# Patient Record
Sex: FEMALE | Race: WHITE | NOT HISPANIC OR LATINO | Employment: OTHER | URBAN - METROPOLITAN AREA
[De-identification: names, ages, dates, MRNs, and addresses within clinical notes are randomized per-mention and may not be internally consistent; named-entity substitution may affect disease eponyms.]

---

## 2017-04-17 LAB — HCV AB SER-ACNC: NON REACTIVE

## 2018-12-24 ENCOUNTER — TRANSCRIBE ORDERS (OUTPATIENT)
Dept: ADMINISTRATIVE | Age: 68
End: 2018-12-24

## 2018-12-24 ENCOUNTER — OFFICE VISIT (OUTPATIENT)
Dept: LAB | Age: 68
End: 2018-12-24
Payer: MEDICARE

## 2018-12-24 DIAGNOSIS — N81.2 UTEROVAGINAL PROLAPSE, INCOMPLETE: ICD-10-CM

## 2018-12-24 DIAGNOSIS — N81.2 UTEROVAGINAL PROLAPSE, INCOMPLETE: Primary | ICD-10-CM

## 2018-12-24 LAB
ATRIAL RATE: 94 BPM
P AXIS: 78 DEGREES
PR INTERVAL: 146 MS
QRS AXIS: 56 DEGREES
QRSD INTERVAL: 90 MS
QT INTERVAL: 354 MS
QTC INTERVAL: 442 MS
T WAVE AXIS: 52 DEGREES
VENTRICULAR RATE: 94 BPM

## 2018-12-24 PROCEDURE — 93005 ELECTROCARDIOGRAM TRACING: CPT

## 2018-12-24 PROCEDURE — 93010 ELECTROCARDIOGRAM REPORT: CPT | Performed by: INTERNAL MEDICINE

## 2018-12-27 RX ORDER — MULTIVIT-MIN/IRON/FOLIC ACID/K 18-600-40
2000 CAPSULE ORAL DAILY
COMMUNITY

## 2018-12-27 RX ORDER — ATORVASTATIN CALCIUM 20 MG/1
20 TABLET, FILM COATED ORAL DAILY
COMMUNITY
Start: 2012-01-03 | End: 2019-09-06 | Stop reason: SDUPTHER

## 2018-12-27 RX ORDER — LEVOTHYROXINE SODIUM 88 UG/1
88 TABLET ORAL DAILY
COMMUNITY
End: 2019-09-06 | Stop reason: SDUPTHER

## 2018-12-27 RX ORDER — MULTIVITAMIN
1 TABLET ORAL DAILY
COMMUNITY

## 2019-01-07 ENCOUNTER — ANESTHESIA EVENT (OUTPATIENT)
Dept: PERIOP | Facility: HOSPITAL | Age: 69
End: 2019-01-07
Payer: MEDICARE

## 2019-01-08 ENCOUNTER — ANESTHESIA (OUTPATIENT)
Dept: PERIOP | Facility: HOSPITAL | Age: 69
End: 2019-01-08
Payer: MEDICARE

## 2019-01-08 ENCOUNTER — HOSPITAL ENCOUNTER (OUTPATIENT)
Facility: HOSPITAL | Age: 69
Setting detail: OUTPATIENT SURGERY
Discharge: HOME | End: 2019-01-09
Attending: OBSTETRICS & GYNECOLOGY | Admitting: OBSTETRICS & GYNECOLOGY
Payer: MEDICARE

## 2019-01-08 DIAGNOSIS — N81.2 UTEROVAGINAL PROLAPSE, INCOMPLETE: Primary | ICD-10-CM

## 2019-01-08 PROCEDURE — C2631 REP DEV, URINARY, W/O SLING: HCPCS | Performed by: OBSTETRICS & GYNECOLOGY

## 2019-01-08 PROCEDURE — C1781 MESH (IMPLANTABLE): HCPCS | Performed by: OBSTETRICS & GYNECOLOGY

## 2019-01-08 PROCEDURE — C1771 REP DEV, URINARY, W/SLING: HCPCS | Performed by: OBSTETRICS & GYNECOLOGY

## 2019-01-08 DEVICE — SYSTEM ANCHORSURE ANCHOR F/PELVIC FLOOR: Type: IMPLANTABLE DEVICE | Site: VAGINA | Status: FUNCTIONAL

## 2019-01-08 DEVICE — SLING TRNVG MID URETHRAL ADVANTAGE BLUE: Type: IMPLANTABLE DEVICE | Site: VAGINA | Status: FUNCTIONAL

## 2019-01-08 DEVICE — DIRECT FIX™ ANTERIOR POLYPROPYLENE MESH
Type: IMPLANTABLE DEVICE | Site: VAGINA | Status: FUNCTIONAL
Brand: RESTORELLE

## 2019-01-08 DEVICE — SYSTEM ANCHORSURE F/PELVIC FLOOR: Type: IMPLANTABLE DEVICE | Site: VAGINA | Status: FUNCTIONAL

## 2019-01-08 RX ORDER — LEVOTHYROXINE SODIUM 88 UG/1
88 TABLET ORAL
Status: DISCONTINUED | OUTPATIENT
Start: 2019-01-09 | End: 2019-01-09 | Stop reason: HOSPADM

## 2019-01-08 RX ORDER — FENTANYL CITRATE/PF 50 MCG/ML
50 SYRINGE (ML) INJECTION
Status: DISCONTINUED | OUTPATIENT
Start: 2019-01-08 | End: 2019-01-08 | Stop reason: HOSPADM

## 2019-01-08 RX ORDER — PROPOFOL 10 MG/ML
INJECTION, EMULSION INTRAVENOUS CONTINUOUS PRN
Status: DISCONTINUED | OUTPATIENT
Start: 2019-01-08 | End: 2019-01-08 | Stop reason: SURG

## 2019-01-08 RX ORDER — ONDANSETRON 2 MG/ML
4 INJECTION INTRAMUSCULAR; INTRAVENOUS ONCE AS NEEDED
Status: DISCONTINUED | OUTPATIENT
Start: 2019-01-08 | End: 2019-01-08 | Stop reason: HOSPADM

## 2019-01-08 RX ORDER — SODIUM CHLORIDE 9 MG/ML
INJECTION, SOLUTION INTRAVENOUS AS NEEDED
Status: DISCONTINUED | OUTPATIENT
Start: 2019-01-08 | End: 2019-01-08 | Stop reason: HOSPADM

## 2019-01-08 RX ORDER — PANTOPRAZOLE SODIUM 40 MG/1
40 TABLET, DELAYED RELEASE ORAL
Status: DISCONTINUED | OUTPATIENT
Start: 2019-01-09 | End: 2019-01-09 | Stop reason: HOSPADM

## 2019-01-08 RX ORDER — KETOROLAC TROMETHAMINE 30 MG/ML
INJECTION, SOLUTION INTRAMUSCULAR; INTRAVENOUS AS NEEDED
Status: DISCONTINUED | OUTPATIENT
Start: 2019-01-08 | End: 2019-01-08 | Stop reason: SURG

## 2019-01-08 RX ORDER — MIDAZOLAM HYDROCHLORIDE 1 MG/ML
INJECTION INTRAMUSCULAR; INTRAVENOUS AS NEEDED
Status: DISCONTINUED | OUTPATIENT
Start: 2019-01-08 | End: 2019-01-08 | Stop reason: SURG

## 2019-01-08 RX ORDER — SODIUM CHLORIDE 9 MG/ML
75 INJECTION, SOLUTION INTRAVENOUS CONTINUOUS
Status: DISCONTINUED | OUTPATIENT
Start: 2019-01-08 | End: 2019-01-09 | Stop reason: HOSPADM

## 2019-01-08 RX ORDER — ACETAMINOPHEN 325 MG/1
650 TABLET ORAL EVERY 6 HOURS
Status: DISCONTINUED | OUTPATIENT
Start: 2019-01-08 | End: 2019-01-09 | Stop reason: HOSPADM

## 2019-01-08 RX ORDER — MAGNESIUM HYDROXIDE 1200 MG/15ML
LIQUID ORAL AS NEEDED
Status: DISCONTINUED | OUTPATIENT
Start: 2019-01-08 | End: 2019-01-08 | Stop reason: HOSPADM

## 2019-01-08 RX ORDER — BUPIVACAINE HYDROCHLORIDE AND EPINEPHRINE 5; 5 MG/ML; UG/ML
INJECTION, SOLUTION EPIDURAL; INTRACAUDAL; PERINEURAL AS NEEDED
Status: DISCONTINUED | OUTPATIENT
Start: 2019-01-08 | End: 2019-01-08 | Stop reason: HOSPADM

## 2019-01-08 RX ORDER — FUROSEMIDE 10 MG/ML
INJECTION INTRAMUSCULAR; INTRAVENOUS AS NEEDED
Status: DISCONTINUED | OUTPATIENT
Start: 2019-01-08 | End: 2019-01-08 | Stop reason: SURG

## 2019-01-08 RX ORDER — PROPOFOL 10 MG/ML
INJECTION, EMULSION INTRAVENOUS AS NEEDED
Status: DISCONTINUED | OUTPATIENT
Start: 2019-01-08 | End: 2019-01-08 | Stop reason: SURG

## 2019-01-08 RX ORDER — IBUPROFEN 600 MG/1
600 TABLET ORAL EVERY 6 HOURS PRN
Status: DISCONTINUED | OUTPATIENT
Start: 2019-01-09 | End: 2019-01-09 | Stop reason: HOSPADM

## 2019-01-08 RX ORDER — LIDOCAINE HYDROCHLORIDE AND EPINEPHRINE 15; 5 MG/ML; UG/ML
INJECTION, SOLUTION EPIDURAL AS NEEDED
Status: DISCONTINUED | OUTPATIENT
Start: 2019-01-08 | End: 2019-01-08 | Stop reason: SURG

## 2019-01-08 RX ORDER — CEFAZOLIN SODIUM 2 G/50ML
2000 SOLUTION INTRAVENOUS
Status: DISCONTINUED | OUTPATIENT
Start: 2019-01-08 | End: 2019-01-08 | Stop reason: HOSPADM

## 2019-01-08 RX ORDER — SODIUM CHLORIDE 9 MG/ML
125 INJECTION, SOLUTION INTRAVENOUS CONTINUOUS
Status: DISCONTINUED | OUTPATIENT
Start: 2019-01-08 | End: 2019-01-08 | Stop reason: HOSPADM

## 2019-01-08 RX ORDER — ONDANSETRON 2 MG/ML
INJECTION INTRAMUSCULAR; INTRAVENOUS AS NEEDED
Status: DISCONTINUED | OUTPATIENT
Start: 2019-01-08 | End: 2019-01-08 | Stop reason: SURG

## 2019-01-08 RX ORDER — KETOROLAC TROMETHAMINE 30 MG/ML
15 INJECTION, SOLUTION INTRAMUSCULAR; INTRAVENOUS EVERY 6 HOURS SCHEDULED
Status: DISPENSED | OUTPATIENT
Start: 2019-01-08 | End: 2019-01-09

## 2019-01-08 RX ORDER — ONDANSETRON 2 MG/ML
4 INJECTION INTRAMUSCULAR; INTRAVENOUS EVERY 6 HOURS PRN
Status: DISCONTINUED | OUTPATIENT
Start: 2019-01-08 | End: 2019-01-09 | Stop reason: HOSPADM

## 2019-01-08 RX ORDER — DOCUSATE SODIUM 100 MG/1
100 CAPSULE, LIQUID FILLED ORAL 2 TIMES DAILY
Status: DISCONTINUED | OUTPATIENT
Start: 2019-01-08 | End: 2019-01-09 | Stop reason: HOSPADM

## 2019-01-08 RX ORDER — FENTANYL CITRATE 50 UG/ML
INJECTION, SOLUTION INTRAMUSCULAR; INTRAVENOUS AS NEEDED
Status: DISCONTINUED | OUTPATIENT
Start: 2019-01-08 | End: 2019-01-08 | Stop reason: SURG

## 2019-01-08 RX ADMIN — PROPOFOL 80 MCG/KG/MIN: 10 INJECTION, EMULSION INTRAVENOUS at 14:41

## 2019-01-08 RX ADMIN — FUROSEMIDE 10 MG: 10 INJECTION, SOLUTION INTRAMUSCULAR; INTRAVENOUS at 16:28

## 2019-01-08 RX ADMIN — ACETAMINOPHEN 650 MG: 325 TABLET, FILM COATED ORAL at 21:09

## 2019-01-08 RX ADMIN — LIDOCAINE HYDROCHLORIDE AND EPINEPHRINE 5 ML: 15; 5 INJECTION, SOLUTION EPIDURAL at 14:05

## 2019-01-08 RX ADMIN — LIDOCAINE HYDROCHLORIDE AND EPINEPHRINE 5 ML: 15; 5 INJECTION, SOLUTION EPIDURAL at 16:02

## 2019-01-08 RX ADMIN — KETOROLAC TROMETHAMINE 15 MG: 30 INJECTION, SOLUTION INTRAMUSCULAR at 16:49

## 2019-01-08 RX ADMIN — SODIUM CHLORIDE 75 ML/HR: 0.9 INJECTION, SOLUTION INTRAVENOUS at 18:55

## 2019-01-08 RX ADMIN — CEFAZOLIN SODIUM 2000 MG: 2 SOLUTION INTRAVENOUS at 14:22

## 2019-01-08 RX ADMIN — DOCUSATE SODIUM 100 MG: 100 CAPSULE, LIQUID FILLED ORAL at 21:10

## 2019-01-08 RX ADMIN — SODIUM CHLORIDE: 0.9 INJECTION, SOLUTION INTRAVENOUS at 16:45

## 2019-01-08 RX ADMIN — FENTANYL CITRATE 100 MCG: 50 INJECTION, SOLUTION INTRAMUSCULAR; INTRAVENOUS at 14:02

## 2019-01-08 RX ADMIN — SODIUM CHLORIDE 125 ML/HR: 0.9 INJECTION, SOLUTION INTRAVENOUS at 11:32

## 2019-01-08 RX ADMIN — KETOROLAC TROMETHAMINE 15 MG: 30 INJECTION, SOLUTION INTRAMUSCULAR at 21:09

## 2019-01-08 RX ADMIN — MIDAZOLAM 4 MG: 1 INJECTION INTRAMUSCULAR; INTRAVENOUS at 14:02

## 2019-01-08 RX ADMIN — SODIUM CHLORIDE 125 ML/HR: 0.9 INJECTION, SOLUTION INTRAVENOUS at 14:18

## 2019-01-08 RX ADMIN — PROPOFOL 20 MG: 10 INJECTION, EMULSION INTRAVENOUS at 16:00

## 2019-01-08 RX ADMIN — ONDANSETRON 4 MG: 2 INJECTION INTRAMUSCULAR; INTRAVENOUS at 15:30

## 2019-01-08 RX ADMIN — FENTANYL CITRATE 25 MCG: 50 INJECTION, SOLUTION INTRAMUSCULAR; INTRAVENOUS at 17:58

## 2019-01-08 RX ADMIN — LIDOCAINE HYDROCHLORIDE AND EPINEPHRINE 5 ML: 15; 5 INJECTION, SOLUTION EPIDURAL at 14:10

## 2019-01-08 RX ADMIN — PROPOFOL 30 MG: 10 INJECTION, EMULSION INTRAVENOUS at 14:41

## 2019-01-08 NOTE — ANESTHESIA PREPROCEDURE EVALUATION
Review of Systems/Medical History  Patient summary reviewed  Chart reviewed  No history of anesthetic complications     Cardiovascular  Hyperlipidemia,    Pulmonary  Negative pulmonary ROS        GI/Hepatic  Negative GI/hepatic ROS          Negative  ROS        Endo/Other  History of thyroid disease ,      GYN      Comment: Utero-vag prolapse      Hematology  Negative hematology ROS      Musculoskeletal    Arthritis     Neurology      Comment: Decreased hearing Left ear   H/o acoustic neuroma  Psychology   Negative psychology ROS                   Anesthesia Plan  ASA Score- 2     Anesthesia Type- epidural and IV sedation with anesthesia with ASA Monitors  Additional Monitors:   Airway Plan:         Plan Factors-    Induction- intravenous  Postoperative Plan-     Informed Consent- Anesthetic plan and risks discussed with patient

## 2019-01-08 NOTE — DISCHARGE INSTRUCTIONS
Please remove your vaginal packing at home in 4 days  Anterior Vaginal Repair with Mesh  WHAT YOU NEED TO KNOW:   An anterior vaginal repair is a procedure to lift or tighten the front vaginal wall  This can help prevent you from leaking urine  The procedure is also called an anterior colporrhaphy  DISCHARGE INSTRUCTIONS:   Medicines:   · Pain medicine: You may need medicine to take away or decrease pain  ¨ Learn how to take your medicine  Ask what medicine and how much you should take  Be sure you know how, when, and how often to take it  ¨ Do not wait until the pain is severe before you take your medicine  Tell caregivers if your pain does not decrease  ¨ Pain medicine can make you dizzy or sleepy  Prevent falls by calling someone when you get out of bed or if you need help  · Antibiotics: This medicine is given to fight or prevent an infection caused by bacteria  Always take your antibiotics exactly as ordered by your healthcare provider  Do not stop taking your medicine unless directed by your healthcare provider  Never save antibiotics or take leftover antibiotics that were given to you for another illness  · Take your medicine as directed  Contact your healthcare provider if you think your medicine is not helping or if you have side effects  Tell him or her if you are allergic to any medicine  Keep a list of the medicines, vitamins, and herbs you take  Include the amounts, and when and why you take them  Bring the list or the pill bottles to follow-up visits  Carry your medicine list with you in case of an emergency  Follow up with your healthcare provider as directed:  Write down your questions so you remember to ask them during your visits  Self-care:   · Sex:  Do not have sex until your healthcare provider says it is okay  · Kegel exercises: To do kegel exercises, squeeze your pelvic floor muscles for 5 to 10 seconds, then release   Regular kegel exercises will help your pelvic floor muscles become stronger  This will help prevent you from leaking urine  Ask your healthcare provider when to start these exercises and how often to do them  · Sanitary pad:  Change your sanitary pad regularly  Keep track of how often you change the pad  · Olivas catheter:  Keep the bag below your waist  This will help prevent infection and other problems caused by urine flowing back into your bladder  Do not pull on the catheter because this can cause pain and bleeding, and the catheter could come out  Keep the catheter tubing free of kinks so your urine will flow into the bag  Your healthcare provider will remove the catheter as soon as possible, to help prevent infection  · Wound care:  When you are allowed to bathe or shower, carefully wash your vaginal area with soap and water  · Do not put pressure on your abdomen: This will help prevent damage to your surgery area  Do not strain, lift heavy objects, or stand for a very long time  Do not perform strenuous exercises, such as running and weight lifting  · Activity:  You may need to start walking within a few days after your procedure  Ask your healthcare provider when to start and how long you should walk  Ask about any other exercises that may be right for you  · Support socks: You may need to wear support socks  These are tight socks that help increase the circulation in your legs until you are more active  This helps prevent blood clots  Contact your healthcare provider if:   · You soak a sanitary pad with blood every hour for 4 hours  · You have vaginal pain that does not go away even after you take pain medicine  · You have pus or a foul-smelling discharge from your genital area  · You see blood in your urine  · You have pain during sex  · You have a fever, chills, a cough, or feel weak and achy  · You have nausea and vomiting  · You have questions or concerns about your condition or care    Seek care immediately or call 911 if:   · You feel something is bulging out into your vagina or rectum and not going back in     · You cannot urinate  · Your arm or leg feels warm, tender, and painful  It may look swollen and red  · You suddenly feel lightheaded and short of breath  · You have chest pain  You may have more pain when you take a deep breath or cough  You may cough up blood  © 2017 [address] Information is for End User's use only and may not be sold, redistributed or otherwise used for commercial purposes  All illustrations and images included in CareNotes® are the copyrighted property of A D A M , Inc  or Private Company  The above information is an  only  It is not intended as medical advice for individual conditions or treatments  Talk to your doctor, nurse or pharmacist before following any medical regimen to see if it is safe and effective for you  Posterior Vaginal Repair   WHAT YOU NEED TO KNOW:   A posterior vaginal repair is surgery to fix a rectocele or vaginal hernia  DISCHARGE INSTRUCTIONS:   Medicines:   · Pain medicine  will help take away or decrease pain  Do not wait until the pain is severe before you take your medicine  · NSAIDs , such as ibuprofen, help decrease swelling, pain, and fever  This medicine is available with or without a doctor's order  NSAIDs can cause stomach bleeding or kidney problems in certain people  If you take blood thinner medicine, always ask your healthcare provider if NSAIDs are safe for you  Always read the medicine label and follow directions  · Bowel movement softeners  make it easier for you to have a bowel movement  You may need this medicine to treat or prevent constipation  · Take your medicine as directed  Contact your healthcare provider if you think your medicine is not helping or if you have side effects  Tell him or her if you are allergic to any medicine   Keep a list of the medicines, vitamins, and herbs you take  Include the amounts, and when and why you take them  Bring the list or the pill bottles to follow-up visits  Carry your medicine list with you in case of an emergency  Follow up with your gynecologist in 2 weeks: You will need to return to have your incision checked  Write down your questions so you remember to ask them during your visits  Self-care:   · Do not have sex  until your healthcare provider says it is okay  · Do not put anything in your vagina  for 6 weeks after the surgery  This allows time for the wound to heal      · Do not lift more than 10 pounds  for at least 6 weeks  Heavy lifting puts pressure on the surgery area and slows healing  · Avoid heavy exercise  the first few weeks after the surgery  You may try light activity, such as short walks, 3 to 4 weeks after the surgery  · Try not to cough or strain to have a bowel movement  This may cause damage to the surgery area  Ask your healthcare provider about ways to make bowel movements easier so you do not have to strain  · Eat healthy foods and drink liquids as directed  This will help prevent constipation  Healthy foods include fruits, vegetables, whole-grain breads, low-fat dairy products, beans, lean meats, and fish  Contact your healthcare provider or gynecologist if:   · You have vaginal pain that does not go away, even after you take pain medicine  · You have pus or a foul-smelling discharge from your vagina  · You have pain during sex  · You have a fever or chills  · Your wound is red, swollen, or draining pus  · You have questions or concerns about your condition or care  Seek care immediately or call 911 if:   · You soak a sanitary pad with blood every hour for 4 hours  · You feel something is bulging out into your vagina or rectum and not going back in     · You cannot urinate    © 2017 [address] Information is for End User's use only and may not be sold, redistributed or otherwise used for commercial purposes  All illustrations and images included in CareNotes® are the copyrighted property of [employer]  or [employer]  The above information is an  only  It is not intended as medical advice for individual conditions or treatments  Talk to your doctor, nurse or pharmacist before following any medical regimen to see if it is safe and effective for you  Bladder Sling Procedure   WHAT YOU NEED TO KNOW:   A bladder sling procedure is surgery to treat urinary incontinence in women  The sling acts as a hammock to keep your urethra in place and hold it closed when your bladder is full  You may have vaginal bleeding or discharge for up to a week after your surgery  Use sanitary pads  Do not use tampons  You may have some pelvic discomfort or trouble urinating  DISCHARGE INSTRUCTIONS:   Call 911 for any of the following:   · You have sudden trouble breathing  Seek care immediately if:   · Your bleeding gets worse  · You have yellow or foul smelling discharge from your vagina  · You cannot urinate, or you are urinating less than what is normal for you  · You feel confused  Contact your healthcare provider if:   · You have a fever  · You do not feel like you are able to empty your bladder completely when you urinate  · You feel the need to urinate very suddenly  · You have burning or stinging when you urinate  · You have blood in your urine  · Your skin is itchy, swollen, or you have a rash  · You have questions or concerns about your condition or care  Medicines:   · Prescription pain medicine  may be given  Ask your how to take this medicine safely  · Take your medicine as directed  Contact your healthcare provider if you think your medicine is not helping or if you have side effects  Tell him or her if you are allergic to any medicine  Keep a list of the medicines, vitamins, and herbs you take  Include the amounts, and when and why you take them  Bring the list or the pill bottles to follow-up visits  Carry your medicine list with you in case of an emergency  Self-catheterization:  You may need to put a catheter into your bladder after you urinate to empty any remaining urine  A catheter is a small rubber tube used to drain urine  Healthcare providers will teach you how to put the catheter in safely  This may be needed until you are completely emptying your bladder  Olivas catheter: You may have a Olivas catheter for a short period of time  The Olivas is a tube put into your bladder to drain urine into a bag  Keep the bag below your waist  This will prevent urine from flowing back into your bladder and causing an infection or other problems  Also, keep the tube free of kinks so the urine will drain properly  Do not pull on the catheter  This can cause pain and bleeding, and may cause the catheter to come out  Activity:  Do not lift heavy objects for 6 weeks after your procedure  Do not have intercourse for 4 to 6 weeks  Do not use a tampon for 4 weeks  Ask your healthcare provider when you can return to work or your usual activities  Do pelvic muscle exercises: These are also called Kegel exercises  These exercises help strengthen your pelvic muscles and help prevent urine leakage  Tighten the muscles of your pelvis and hold them tight for 5 seconds, then relax for 5 seconds  Gradually work up to tightening them for 10 seconds and relaxing for 10 seconds  Do this 3 times each day  Keep a record:  Keep a record of when you urinate and if you leak any urine  Write down what you were doing when you leaked urine, such as coughing or sneezing  Bring the log to your follow-up visits  Prevent constipation:  Drink liquids as directed  You may need to drink more water than usual to soften your bowel movements  Eat a variety of healthy foods, especially fruit and foods high in fiber   You may need to use an over-the-counter bowel movement softener  Follow up with your healthcare provider as directed: You may need a test to check how much urine remains in your bladder after you urinate  This will help show how the sling is working  Write down your questions so you remember to ask them during your visits  © 2017 [address] Information is for End User's use only and may not be sold, redistributed or otherwise used for commercial purposes  All illustrations and images included in CareNotes® are the copyrighted property of [employer]  or [employer]  The above information is an  only  It is not intended as medical advice for individual conditions or treatments  Talk to your doctor, nurse or pharmacist before following any medical regimen to see if it is safe and effective for you

## 2019-01-08 NOTE — OP NOTE
OPERATIVE REPORT  PATIENT NAME: Katerin Waite    :  1950  MRN: [id number]  Pt Location: [facility] OR ROOM 04    SURGERY DATE: 2019    Surgeon(s) and Role: * Winnie Kruse MD - Primary     * Kellen Fontenot MD - Fellow, Ray Kay MD - Resident, Observe    Preop Diagnosis:  Incomplete uterovaginal prolapse [N81 2]  Cystocele, midline [N81 11]  Rectocele [N81 6]  Pelvic muscle wasting [N81 84]  Other female genital prolapse [N81 89]  Mixed incontinence [N39 46]  Hypermobility of urethra [N36 41]    Post-Op Diagnosis Codes:     * Incomplete uterovaginal prolapse [N81 2]     * Cystocele, midline [N81 11]     * Rectocele [N81 6]     * Pelvic muscle wasting [N81 84]     * Other female genital prolapse [N81 89]     * Mixed incontinence [N39 46]     * Hypermobility of urethra [N36 41]    Procedure(s) (LRB):  ANTERIOR VE COLPOPEXY (N/A)  A&P COLPORRHAPHY (N/A)  PUBOVAGINAL SLING (N/A)  CYSTOSCOPY (N/A)    Specimen(s):  * No specimens in log *    Estimated Blood Loss:   30 mL    Drains:  Urethral Catheter Non-latex 18 Fr  (Active)   Number of days: 0       Anesthesia Type:   Epidural    Operative Indications:  Incomplete uterovaginal prolapse [N81 2]  Cystocele, midline [N81 11]  Rectocele [N81 6]  Pelvic muscle wasting [N81 84]  Other female genital prolapse [N81 89]  Mixed incontinence [N39 46]  Hypermobility of urethra [N36 41]    Operative Findings:  1  Stage 3 uterovaginal prolapse  Cervix flush with vagina  TVL foreshortened  Enlarged outlet  2  Cystoscopy: efflux noted from bilateral ureteral orifices  No mesh, suture material, or injury noted to bladder lumen at completion of case  Complications:   None    Procedure and Technique:    Appropriate preoperative antibiotics chosen per ACOG guidelines were given  Bilateral SCDs were placed in the lower extremities for DVT prevention prior to the institution of anesthesia      No bladder, ureteral, viscus, or solid organ injury were noted at the end of the procedure  The patient was identified in the holding area by the operating room staff and attending physician  She was taken to the operating room where anesthesia was instituted without complications  She was placed in the dorsal lithotomy position with the legs in [address] [place] [address] with care taken to avoid excessive flexion or extension of her lower extremities  The patient was prepped and draped in the usual sterile fashion  A Olivas catheter was inserted  Attention was turned to the anterior vaginal extraperitoneal colpopexy and the anterior colporrhaphy  Two Allis clamps were applied vertically along the midline to grasp the dependent portion of the cystocele for traction  0 25% Marcaine with epinephrine diluted 1:1 with injectable saline was infiltrated into the true vesicovaginal space using a Tuohey epidural needle for hydrodissection  20ml were injected into the midline, 20ml injected into the left and 20ml injected into the right paravaginal spaces for a total of 60ml  Next, a midline anterior vaginal wall incision was made through the full thickness of the the vaginal epithelium, the muscularis and the pubocervical fascia to the vesicovaginal space  This incision was extended to the level of the urethrovesical junction distally and the cervix proximally  Careful lateral dissection was carried out until the paravaginal and paravesical spaces were reached, allowing palpation of the obturator internus muscle, ischial spines, and arcus tendineus, and sacrospinous ligament  The bladder was drained, draining clear urine  The fascia endopelvina was entered by using a Tuohey epidural needle to puncture the obturator membrane at the level of the superior medial notch  Next, the Anchorsure trocar device loaded with a 2-0 Prolene suture was brought onto the field   It was inserted through the left paravaginal and paravesical space towards the level of the sacrospinous ligament  The device was used to place the 2-0 Prolene suture through the sacrospinous ligament approximately 1 5 cm medial to the ischial spine  Once the suture was anchored into place, the trocar was removed  The same procedure was repeated on the contralateral side  Next, the Anchosure device was used to place 2-0 Prolenes suture at the level of the arcus insertion into the pubic ramus on both sides  Next, tacking sutures were placed: a 2-0 PDS suture was distally at the urethrovesical junction and two 2-0 Prolene sutures were placed at the level of the cervix  The Restorelle mesh was brought onto the field and trimmed fit the dimensions of the anterior vaginal wall  It was secured to the vaginal epithelium with the previously placed tacking sutures  The sacrospinous sutures were passed through the proximal arms of the mesh and the arcus tendineus sutures were passed through the distal arms of the mesh  The sutures were tied down to complete the colpopexy and appropriate reduction of the prolapse was noted  The mesh and paravaginal spaces were copiously irrigated with an antibiotic solution  The vaginal epithelium was closed using 2-0 Vicryl suture in a running locked fashion with care taken to incorporate a full-thickness closure  The incision was hemostatic  Next, the retropubic sling procedure was performed  Two areas on the anterior abdominal wall at the level of pubic bone 2 fingerbreadths above and lateral to the midline on the pubic bone, were identified and two skin marks were placed  Local anesthetic solution was delivered at these sites infiltrating the muscle, fascia, and subdermal levels  We then turned our attention to the anterior vaginal wall  The mid urethral level was identified by reference to the Olivas bulb at the external meatus  Local anesthetic solution was injected into the anterior vaginal wall at the midurethral level for hydrodissection and vasoconstriction   Then, 10 mL were injected at the midline and an additional 7 mL were injected to the left and right of midline directing the infiltration laterally towards the ipsilateral mid-axillary line under the pubic symphysis  After completion of hydrodissection, 2 Allis clamps were used to grasp the anterior vaginal wall for traction, and a 1 3-cm incision was made at the midline incising epithelium and muscularis layers  Two Allis clamps were then placed on each cut edge of the incision for stabilization  Tenotomy scissors were used to create a small vaginal tunnel with sharp and blunt dissection in the anterior vaginal wall directing the dissection lateral to the midline, going towards the underside of the pubic bone  Dissection was carried out to the edge of the pubic bone itself, and we did this bilaterally  Once the dissection was complete, the Olivas was drained  The rigid stylette was placed through the Olivas  This will be used for deflection of the bladder, and urethra out of the anticipated path of the trocar  We deviated the bladder to the left side of the patient, and we passed an Advantage sling via the [address] introducer into the pre-dissected tract on the right side of the patient  Once we pierced the pubocervical fascia and passed the bone, we directed our trajectory of the trocar ventral towards the anterior abdominal wall towards the pre-marked skin on the right side the patient maintaining contact with the pubic symphysis  Once we were at the level of the skin, we incised the skin at the luiza with a scalpel, and the trocar was passed through that incision  At this time, this was held with ring forceps leaving the trocar in place  We drained the bladder and removed the Olivas and we performed a cystoscopy by instilling 200 mL of fluid inside the bladder  There was no evidence of any punctures, lacerations, or injuries to the bladder  We reinserted the Olivas catheter to again drain the bladder   We removed the introducer on the right side and pulled the blue plastic cover of the sling with a Susanne clamp until the mesh came out the abdominal incision and secured it at the level of the mesh with the [employer]  We cut the blue plastic cover above the Susanne clamp  We repeated the same sequence of steps for the placement of the left side  Once this was done bilaterally, cystoscopy was performed again  We did not see any lacerations and good efflux was noted bilaterally from the ureteral orifices  We retrograde filled the bladder with 300 mL and removed the cystoscope  We performed the cough stress and adjusted the sling until we saw minimal to no leakage of fluid  Once the sling was tensioned properly, we removed the plastic sleeve surrounding the sling arms by gently pulling them up through the skin incisions bilaterally with a Susanne clamp as counter traction was placed at the midurethral level to avoid any further tightening  The sling arms were trimmed to the level of the skin  The incision in the vaginal mucosa was closed with 2-0 Vicryl suture in a running locked stitch  Good hemostasis was achieved  The skin incisions were closed with Histoacryl  Attention was then turned to the posterior compartment where two Allis clamps were placed in the posterior fourchette over the mucocutaneous border to reduce the markedly relaxed vaginal outlet  Dilute 0 25% Marcaine solution with epinephrine was injected into the perineum  A carlton-shaped incision was made extending from the vaginal mucosa onto the perineum  The overlying scarred vaginal epithelium and perineal skin was removed en bloc with the Bovie device with excellent hemostasis noted throughout  The posterior colporrhaphy was closed with a 2-0 Vicryl suture in a running locked stitch  We reapproximated the perineal body with a 0-Vicryl interrupted suture  The remainder of the colporrhaphy was closed to the level of the hymen   The perineal skin was closed with with 2-0 Vicryl in a subcuticular fashion  The Olivas catheter was reinserted  Vaginal packing was placed in the vagina  The sponge, needle and instrument count were correct x 2  The patient tolerated the procedure well  She was awakened from anesthesia and transferred to the recovery room in stable condition  A qualified resident physician was not available to assist  Dr Diego Strauss was present for the entire procedure      Patient Disposition:  PACU     SIGNATURE: Charly Sandoval MD  DATE: January 8, 2019  TIME: 4:54 PM

## 2019-01-08 NOTE — ANESTHESIA POSTPROCEDURE EVALUATION
Post-Op Assessment Note      CV Status:  Stable    Mental Status:  Alert and awake    Hydration Status:  Euvolemic    PONV Controlled:  Controlled    Airway Patency:  Patent    Post Op Vitals Reviewed:  Yes              /72 (01/08/19 1710)    Temp 98 7 °F (37 1 °C) (01/08/19 1710)    Pulse 89 (01/08/19 1710)   Resp 15 (01/08/19 1710)    SpO2 99 % (01/08/19 1710)

## 2019-01-08 NOTE — ANESTHESIA PROCEDURE NOTES
Epidural Block    Patient location during procedure: holding area  Start time: 1/8/2019 2:04 PM  Reason for block: procedure for pain and at surgeon's request  Staffing  Anesthesiologist: Tatiana Fuller  Performed: anesthesiologist   Preanesthetic Checklist  Completed: patient identified, site marked, surgical consent, pre-op evaluation, timeout performed, IV checked, risks and benefits discussed and monitors and equipment checked  Epidural  Patient position: sitting  Prep: ChloraPrep  Patient monitoring: cardiac monitor and frequent blood pressure checks  Approach: midline  Location: lumbar (1-5)  Injection technique: EVARISTO air  Needle  Needle type: Tuohy   Needle gauge: 18 G  Catheter type: side hole  Catheter size: 20 G  Test dose: negative and lidocaine 1 5% with epinephrine 1-to-200,000  Assessment  Sensory level: [id number] aspiration for CSF, negative aspiration for heme and no paresthesia on injection  patient tolerated the procedure well with no immediate complications

## 2019-01-09 VITALS
DIASTOLIC BLOOD PRESSURE: 70 MMHG | BODY MASS INDEX: 25.69 KG/M2 | OXYGEN SATURATION: 94 % | TEMPERATURE: 98 F | WEIGHT: 145 LBS | RESPIRATION RATE: 18 BRPM | HEIGHT: 63 IN | SYSTOLIC BLOOD PRESSURE: 115 MMHG | HEART RATE: 72 BPM

## 2019-01-09 RX ORDER — ACETAMINOPHEN 500 MG
500 TABLET ORAL EVERY 6 HOURS PRN
Qty: 30 TABLET | Refills: 0
Start: 2019-01-09 | End: 2020-02-24

## 2019-01-09 RX ORDER — IBUPROFEN 600 MG/1
600 TABLET ORAL EVERY 6 HOURS PRN
Qty: 30 TABLET | Refills: 0
Start: 2019-01-09 | End: 2020-02-24

## 2019-01-09 RX ADMIN — DOCUSATE SODIUM 100 MG: 100 CAPSULE, LIQUID FILLED ORAL at 08:45

## 2019-01-09 RX ADMIN — KETOROLAC TROMETHAMINE 15 MG: 30 INJECTION, SOLUTION INTRAMUSCULAR at 03:14

## 2019-01-09 RX ADMIN — LEVOTHYROXINE SODIUM 88 MCG: 88 TABLET ORAL at 06:43

## 2019-01-09 RX ADMIN — IBUPROFEN 600 MG: 600 TABLET ORAL at 11:13

## 2019-01-09 RX ADMIN — ACETAMINOPHEN 650 MG: 325 TABLET, FILM COATED ORAL at 03:13

## 2019-01-09 NOTE — PROGRESS NOTES
Gynecology Progress note   Carlos Alberto Crowe 79 y o  female MRN: [id number]  Unit/Bed#: E2 -56 Encounter: [id number]    Carlos Alberto Crowe has no current complaints  She is not having any pain  Patient is currently voiding through her castaneda, which will be removed this morning  She is ambulating  Patient is not currently passing flatus and has had no bowel movement  She is tolerating PO, and denies nausea or vomitting  Patient denies fever, chills, chest pain, shortness of breath, or calf tenderness  /57 (BP Location: Right arm)   Pulse 78   Temp 99 °F (37 2 °C) (Tympanic)   Resp 18   Ht 5' 3" (1 6 m)   Wt 65 8 kg (145 lb)   SpO2 98%   BMI 25 69 kg/m²     I/O last 3 completed shifts: In: 2500 [I V :2500]  Out: 680 [Urine:650; Blood:30]  I/O this shift:  In: -   Out: 750 [Urine:750]    Lab Results   Component Value Date    WBC 7 30 09/29/2016    HGB 13 9 09/29/2016    HCT 42 1 09/29/2016    MCV 92 09/29/2016     09/29/2016       Lab Results   Component Value Date    CALCIUM 9 5 09/29/2016    K 4 0 09/29/2016    CO2 32 09/29/2016     09/29/2016    BUN 15 09/29/2016    CREATININE 0 79 09/29/2016       Physical Exam  Gen: AAOx3, NAD, comfortable in bed  CVS: S1S2+, RRR, no murmurs  Lungs: CTA b/l normal respiratory effort and rate  Abdomen: soft, non tender  Extremities: SCDs on and on, non tender    A/P: 79 y o  POD# 1 s/p anterior VEC, anterior and posterior colporrhaphy, pubovaginal sling, cystoscopy for incomplete uterovaginal prolapse      1) Adequate urinary output overnight, plan for removal this AM  2) Pain: continue tylenol, toradol for analgesia  3) Diet: regular as tolerated  4) DVT PPx: SCDs  5) Hypothyroidism: continue home levothyroxone  6) Hyperlipidemia: continue home lipitor  7) Encouraged incentive spirometry to reduce atelectasis and pneumonia risk  8) Encouraged ambulation as tolerated  9) Dispo: plan for discharge today    Michelle Gonzalez MD  1/9/2019  6:11 AM

## 2019-01-09 NOTE — PROGRESS NOTES
Post-op Note - OB/GYN   Sarina Primmer 79 y o  female MRN: [id number]  Unit/Bed#: E2 -01 Encounter: [id number]    Assessment:  79 y o  female with incomplete uterovaginal prolapse, s/p anterior VEC, anterior and posterior colporrhaphy, pubovaginal sling, cystoscopy, POD#0    Plan:  Prolapse:  - s/p procedures as above, POD#0  - Cont routine postoperative care  - Encourage ambulation  - Encourage increased PO water intake  - Castaneda in place, draining clear yellow urine  UOP 650cc since procedure with additional 500cc in castaneda over 4 5hr (111cc/hr, 1 68cc/kg/hr)  Plan to remove in AM   - Tylenol, toradol for pain management    Hypothyroidism:  - Cont home levothyroxine    Hyperlipidemia:  - Cont home lipitor    FEN:  - Regular diet as tolerated  - IV fluids until AM    DVT Ppx:  - SCDs bilaterally    Subjective/Objective   Chief Complaint:     79 y o  female with incomplete uterovaginal prolapse, s/p anterior VEC, anterior and posterior colporrhaphy, pubovaginal sling, cystoscopy, POD#0    Subjective:     Pain: no  Tolerating PO: yes  Voiding: Castaneda in place, draining clear urine  Flatus: yes  BM: no  Ambulating: no  Chest pain: no  Shortness of breath: no  Leg pain: no    Pt reports she is doing well and feeling well without concern or complaint at this time  She reports no abdominal or pelvic pain  Has tolerated dinner without difficulty or nausea  Has not yet been out of bed, but does endorse "my butt hurts from sitting in this bed so long"  Objective:     Vitals: Blood pressure 138/64, pulse 83, temperature 98 6 °F (37 °C), temperature source Tympanic, resp  rate 18, height 5' 3" (1 6 m), weight 65 8 kg (145 lb), SpO2 95 %  Physical Exam:   Gen: AaOx3, NAD, pleasant  Card: RRR, no m/r/g  Pulm: CTAB  Abd: Soft, nontender, nondistended  +BS  : Deferred  Extremities: No edema, nontender, Negative Annette's bilaterally      Lab, Imaging and other studies: I have personally reviewed pertinent reports  Lab Results   Component Value Date    WBC 7 30 09/29/2016    HGB 13 9 09/29/2016    HCT 42 1 09/29/2016    MCV 92 09/29/2016     09/29/2016               Garrett Dixon DO  01/08/19

## 2019-04-29 LAB — HBA1C MFR BLD HPLC: 5.8 %

## 2019-08-23 ENCOUNTER — OFFICE VISIT (OUTPATIENT)
Dept: INTERNAL MEDICINE CLINIC | Facility: CLINIC | Age: 69
End: 2019-08-23
Payer: MEDICARE

## 2019-08-23 VITALS
HEART RATE: 96 BPM | OXYGEN SATURATION: 97 % | BODY MASS INDEX: 26.15 KG/M2 | HEIGHT: 63 IN | RESPIRATION RATE: 18 BRPM | WEIGHT: 147.6 LBS | DIASTOLIC BLOOD PRESSURE: 72 MMHG | TEMPERATURE: 98.9 F | SYSTOLIC BLOOD PRESSURE: 110 MMHG

## 2019-08-23 DIAGNOSIS — E78.00 HYPERCHOLESTEROLEMIA: ICD-10-CM

## 2019-08-23 DIAGNOSIS — R73.03 PREDIABETES: ICD-10-CM

## 2019-08-23 DIAGNOSIS — D33.3 LEFT ACOUSTIC NEUROMA (HCC): ICD-10-CM

## 2019-08-23 DIAGNOSIS — K51.919 ULCERATIVE COLITIS WITH COMPLICATION, UNSPECIFIED LOCATION (HCC): ICD-10-CM

## 2019-08-23 DIAGNOSIS — E03.9 HYPOTHYROIDISM, UNSPECIFIED TYPE: ICD-10-CM

## 2019-08-23 DIAGNOSIS — I10 ESSENTIAL HYPERTENSION: Primary | ICD-10-CM

## 2019-08-23 PROBLEM — M15.9 PRIMARY OSTEOARTHRITIS INVOLVING MULTIPLE JOINTS: Status: ACTIVE | Noted: 2019-08-23

## 2019-08-23 PROBLEM — K51.90 ULCERATIVE COLITIS (HCC): Status: ACTIVE | Noted: 2019-08-23

## 2019-08-23 PROBLEM — M15.0 PRIMARY OSTEOARTHRITIS INVOLVING MULTIPLE JOINTS: Status: ACTIVE | Noted: 2019-08-23

## 2019-08-23 PROBLEM — E55.9 VITAMIN D DEFICIENCY: Status: ACTIVE | Noted: 2019-08-23

## 2019-08-23 PROCEDURE — 1123F ACP DISCUSS/DSCN MKR DOCD: CPT | Performed by: NURSE PRACTITIONER

## 2019-08-23 PROCEDURE — 99204 OFFICE O/P NEW MOD 45 MIN: CPT | Performed by: NURSE PRACTITIONER

## 2019-08-23 NOTE — PROGRESS NOTES
Assessment/Plan:    Ulcerative colitis ([facility] [address] )  Has not been on medications for years   Improved after care home   Declines follow up with GI or further colonoscopies unless she has a problem     Hypothyroidism  On replacement therapy  Due for TFTs    Essential hypertension  Not on medication  Blood pressure very good in office     Left acoustic neuroma [facility])  Care at [facility] 2019, no changes, repeat in 4 years    Prediabetes  Advised to reduce sugars/carbs  Recheck A1C    Hypercholesterolemia  Continue statin  Check lipid panel        Diagnoses and all orders for this visit:    Essential hypertension  -     Comprehensive metabolic panel; Future    Hypercholesterolemia  -     Lipid Panel with Direct LDL reflex; Future    Prediabetes  -     Hemoglobin A1C; Future    Hypothyroidism, unspecified type  -     TSH, 3rd generation with Free T4 reflex; Future    Left acoustic neuroma (HCC)    Ulcerative colitis with complication, unspecified location [facility])          Subjective:      Patient ID: Suresh Rios is a 81 y o  female  Here today to establish care   Long time patient of Dr Laurel Velásquez is doing well today, she has no concerns     Records from Dr Sofie Moreira were reviewed with patient     HTN- not on medication, she questions if she has a history of this, blood pressure very good in office     Prediabetes- last A1C 5 8% , diet controlled    Hyperlipidemia- on statin which she tolerates well, denies chest pain or shortness of breath    Long history of ulcerative colitis  Symptoms were worse while she was working  Now that she's retired she hasn't had any flare ups  Was on azaulfidine in the past, but has not taken for many years   Does not follow with colorectal or GI  Colonoscopy >10 years ago, declined further colonoscopies     Left acoustic neuroma diagnosed in 2007  Had fractionated stereotactic radiotherapy at [facility]   follows with [facility] neurosurgery, last MRI stable , repeat in 4 years Surgery Jan 2019 for uterovaginal prolapse by Dr Nata Sanders is up to date, goes to [facility] for testing         The following portions of the patient's history were reviewed and updated as appropriate: allergies, current medications, past family history, past medical history, past social history, past surgical history and problem list     Review of Systems   Constitutional: Negative for activity change, appetite change, fatigue and unexpected weight change  HENT: Positive for hearing loss  Left ear   Eyes: Negative for visual disturbance  Respiratory: Negative for cough, chest tightness, shortness of breath and wheezing  Cardiovascular: Negative for chest pain, palpitations and leg swelling  Gastrointestinal: Negative for abdominal pain, blood in stool, constipation and diarrhea  Genitourinary: Negative for difficulty urinating  Musculoskeletal: Positive for arthralgias  Skin: Negative for rash  Neurological: Negative for dizziness, weakness, light-headedness and headaches  Psychiatric/Behavioral: Negative for sleep disturbance  Objective:      /72   Pulse 96   Temp 98 9 °F (37 2 °C) (Oral)   Resp 18   Ht 5' 3" (1 6 m)   Wt 67 kg (147 lb 9 6 oz)   SpO2 97%   BMI 26 15 kg/m²          Physical Exam   Constitutional: She is oriented to person, place, and time  She appears well-developed and well-nourished  HENT:   Head: Normocephalic and atraumatic  Mouth/Throat: Oropharynx is clear and moist    Eyes: Pupils are equal, round, and reactive to light  Conjunctivae are normal    Neck: No thyromegaly present  Cardiovascular: Normal rate, regular rhythm, normal heart sounds and intact distal pulses  Pulmonary/Chest: Effort normal and breath sounds normal    Abdominal: Soft  Bowel sounds are normal    Musculoskeletal: Normal range of motion  She exhibits no edema  Lymphadenopathy:     She has no cervical adenopathy     Neurological: She is alert and oriented to person, place, and time  Skin: Skin is warm and dry  Psychiatric: She has a normal mood and affect  Her behavior is normal    Vitals reviewed

## 2019-08-23 NOTE — ASSESSMENT & PLAN NOTE
Has not been on medications for years   Improved after intermediate   Declines follow up with GI or further colonoscopies unless she has a problem

## 2019-09-06 DIAGNOSIS — I10 ESSENTIAL HYPERTENSION: Primary | ICD-10-CM

## 2019-09-06 DIAGNOSIS — E78.00 HYPERCHOLESTEROLEMIA: ICD-10-CM

## 2019-09-06 DIAGNOSIS — E03.9 HYPOTHYROIDISM, UNSPECIFIED TYPE: ICD-10-CM

## 2019-09-06 RX ORDER — LEVOTHYROXINE SODIUM 88 UG/1
88 TABLET ORAL DAILY
Qty: 90 TABLET | Refills: 1 | Status: SHIPPED | OUTPATIENT
Start: 2019-09-06 | End: 2020-03-23 | Stop reason: SDUPTHER

## 2019-09-06 RX ORDER — ATORVASTATIN CALCIUM 20 MG/1
20 TABLET, FILM COATED ORAL DAILY
Qty: 90 TABLET | Refills: 1 | Status: SHIPPED | OUTPATIENT
Start: 2019-09-06 | End: 2020-03-23 | Stop reason: SDUPTHER

## 2019-11-20 ENCOUNTER — OFFICE VISIT (OUTPATIENT)
Dept: INTERNAL MEDICINE CLINIC | Facility: CLINIC | Age: 69
End: 2019-11-20
Payer: MEDICARE

## 2019-11-20 VITALS
OXYGEN SATURATION: 98 % | DIASTOLIC BLOOD PRESSURE: 76 MMHG | HEIGHT: 63 IN | TEMPERATURE: 97.7 F | HEART RATE: 95 BPM | WEIGHT: 143 LBS | BODY MASS INDEX: 25.34 KG/M2 | RESPIRATION RATE: 16 BRPM | SYSTOLIC BLOOD PRESSURE: 122 MMHG

## 2019-11-20 DIAGNOSIS — K51.919 ULCERATIVE COLITIS WITH COMPLICATION, UNSPECIFIED LOCATION (HCC): ICD-10-CM

## 2019-11-20 DIAGNOSIS — Z13.820 SCREENING FOR OSTEOPOROSIS: ICD-10-CM

## 2019-11-20 DIAGNOSIS — E66.3 OVERWEIGHT (BMI 25.0-29.9): ICD-10-CM

## 2019-11-20 DIAGNOSIS — Z00.00 MEDICARE ANNUAL WELLNESS VISIT, SUBSEQUENT: ICD-10-CM

## 2019-11-20 DIAGNOSIS — R07.89 CHEST DISCOMFORT: Primary | ICD-10-CM

## 2019-11-20 DIAGNOSIS — R42 VERTIGO: ICD-10-CM

## 2019-11-20 DIAGNOSIS — Z12.11 SCREENING FOR COLON CANCER: ICD-10-CM

## 2019-11-20 DIAGNOSIS — E03.9 HYPOTHYROIDISM, UNSPECIFIED TYPE: ICD-10-CM

## 2019-11-20 DIAGNOSIS — I10 ESSENTIAL HYPERTENSION: ICD-10-CM

## 2019-11-20 DIAGNOSIS — E78.00 HYPERCHOLESTEROLEMIA: ICD-10-CM

## 2019-11-20 LAB — ECG INTERP DURING EX: NORMAL MS

## 2019-11-20 PROCEDURE — 99214 OFFICE O/P EST MOD 30 MIN: CPT | Performed by: NURSE PRACTITIONER

## 2019-11-20 PROCEDURE — G0439 PPPS, SUBSEQ VISIT: HCPCS | Performed by: NURSE PRACTITIONER

## 2019-11-20 PROCEDURE — 93000 ELECTROCARDIOGRAM COMPLETE: CPT | Performed by: NURSE PRACTITIONER

## 2019-11-20 RX ORDER — MECLIZINE HYDROCHLORIDE 25 MG/1
25 TABLET ORAL EVERY 8 HOURS SCHEDULED
Qty: 30 TABLET | Refills: 0 | Status: SHIPPED | OUTPATIENT
Start: 2019-11-20 | End: 2020-02-24

## 2019-11-20 NOTE — PROGRESS NOTES
Assessment and Plan:     Problem List Items Addressed This Visit        Digestive    Ulcerative colitis ([address] )     No episodes since prison   Declines further colonoscopies  Agreeable to do FOBT testing          Relevant Orders    Occult blood 1-3, stool       Endocrine    Hypothyroidism     Check TFT's   On levothyroxine             Cardiovascular and Mediastinum    Essential hypertension     Not on medication, improved with healthy eating and lifestyle changes            Other    Hypercholesterolemia     On statin  Check lipids         Overweight (BMI 25 0-29  9)      Other Visit Diagnoses     Chest discomfort    -  Primary    ekg NSR HR 88   since pain is reproducible on exam, suspect it is muscular  advised to go to the ER if worsens/persists  check labs, including thyroid       Relevant Orders    POCT ECG (Completed)    Medicare annual wellness visit, subsequent        Screening for colon cancer        Relevant Orders    Occult blood 1-3, stool    Screening for osteoporosis        Relevant Orders    DXA bone density spine hip and pelvis    Vertigo        Relevant Medications    meclizine (ANTIVERT) 25 mg tablet        BMI Counseling: Body mass index is 25 33 kg/m²  The BMI is above normal  Nutrition recommendations include decreasing portion sizes, encouraging healthy choices of fruits and vegetables, decreasing fast food intake, consuming healthier snacks, limiting drinks that contain sugar, moderation in carbohydrate intake and increasing intake of lean protein  Exercise recommendations include exercising 3-5 times per week  Preventive health issues were discussed with patient, and age appropriate screening tests were ordered as noted in patient's After Visit Summary  Personalized health advice and appropriate referrals for health education or preventive services given if needed, as noted in patient's After Visit Summary       History of Present Illness:     Patient presents for Medicare Annual Wellness visit    Patient Care Team:  Анна rocha PCP - General (Internal Medicine)     Problem List:     Patient Active Problem List   Diagnosis    Essential hypertension    Hypercholesterolemia    Hypothyroidism    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[facility] [address] )    Incomplete uterovaginal prolapse    Other specified disorders of bladder    Primary osteoarthritis involving multiple joints    Ulcerative colitis ([facility] [address] )    Vitamin D deficiency    Prediabetes    Left acoustic neuroma ([facility] [address] )    Overweight (BMI 25 0-29  9)      Past Medical and Surgical History:     Past Medical History:   Diagnosis Date    Acoustic neuroma ([facility] [address] )     left ear, decreased hearing    Arthritis     right knee    Disease of thyroid gland     Hyperlipidemia     Urethral hypermobility     Uterine prolapse      Past Surgical History:   Procedure Laterality Date    COLONOSCOPY      NE CMBND ANTERPOST COLPORRAPHY W/CYSTO N/A 1/8/2019    Procedure: A&P COLPORRHAPHY;  Surgeon: Eusebia Wells MD;  [facility];  Service: UroGynecology           NE CYSTOURETHROSCOPY N/A 1/8/2019    Procedure: Rudene Mannheim;  Surgeon: Eusebia Wells MD;  [facility];  Service: UroGynecology           NE REVAGINAL PROLAPSE,SACROSP [address] N/A 1/8/2019    Procedure: ANTERIOR VE COLPOPEXY;  Surgeon: Eusebia Wells MD;  [facility];  Service: UroGynecology           NE SLING OPER STRES INCONTINENCE N/A 1/8/2019    Procedure: PUBOVAGINAL SLING;  Surgeon: Eusebia Wells MD;  [facility];  Service: UroGynecology           TUBAL LIGATION      WISDOM TOOTH EXTRACTION        Family History:     Family History   Problem Relation Age of Onset    Arthritis Family     Diabetes Family     Hypertension Family     Osteoporosis Family     Kidney disease Family     Thyroid disease Family     Hypertension Mother     Thyroid disease Mother     Diabetes Father     Asthma Father     Lung disease Father     Thyroid disease Sister     Thyroid disease Daughter     Mental illness Neg Hx     Substance Abuse Neg Hx       Social History:     Social History     Socioeconomic History    Marital status: /Civil Union     Spouse name: None    Number of children: None    Years of education: None    Highest education level: None   Occupational History    None   Social Needs    Financial resource strain: None    Food insecurity:     Worry: None     Inability: None    Transportation needs:     Medical: None     Non-medical: None   Tobacco Use    Smoking status: Never Smoker    Smokeless tobacco: Never Used   Substance and Sexual Activity    Alcohol use: Yes     Comment: rare    Drug use: No    Sexual activity: None   Lifestyle    Physical activity:     Days per week: None     Minutes per session: None    Stress: None   Relationships    Social connections:     Talks on phone: None     Gets together: None     Attends Scientologist service: None     Active member of club or organization: None     Attends meetings of clubs or organizations: None     Relationship status: None    Intimate partner violence:     Fear of current or ex partner: None     Emotionally abused: None     Physically abused: None     Forced sexual activity: None   Other Topics Concern    None   Social History Narrative    Drinks coffee 1-2 cups/day       Medications and Allergies:     Current Outpatient Medications   Medication Sig Dispense Refill    atorvastatin (LIPITOR) 20 mg tablet Take 1 tablet (20 mg total) by mouth daily 90 tablet 1    b complex vitamins tablet Take 1 tablet by mouth daily      BIOTIN PO Take 1,000 mg by mouth daily      Cholecalciferol (VITAMIN D) 2000 units CAPS Take 50 Units by mouth daily       levothyroxine 88 mcg tablet Take 1 tablet (88 mcg total) by mouth daily 90 tablet 1    Multiple Vitamin (MULTIVITAMIN) tablet Take 1 tablet by mouth daily      acetaminophen (TYLENOL) 500 mg tablet Take 1 tablet (500 mg total) by mouth every 6 (six) hours as needed for mild pain (Patient not taking: Reported on 8/23/2019) 30 tablet 0    ibuprofen (MOTRIN) 600 mg tablet Take 1 tablet (600 mg total) by mouth every 6 (six) hours as needed for mild pain (Patient not taking: Reported on 8/23/2019) 30 tablet 0    meclizine (ANTIVERT) 25 mg tablet Take 1 tablet (25 mg total) by mouth every 8 (eight) hours 30 tablet 0     No current facility-administered medications for this visit  Allergies   Allergen Reactions    Erythromycin Rash      Immunizations:     Immunization History   Administered Date(s) Administered    INFLUENZA 12/14/2018    Influenza Split High Dose Preservative Free IM 11/16/2019      Health Maintenance:         Topic Date Due    Hepatitis C Screening  1950    CRC Screening: FOBTx3/FIT  03/24/2002    MAMMOGRAM  03/01/2020         Topic Date Due    DTaP,Tdap,and Td Vaccines (1 - Tdap) 03/24/1963    Pneumococcal Vaccine: 65+ Years (1 of 2 - PCV13) 03/24/2017      Medicare Health Risk Assessment:     /76   Pulse 95   Temp 97 7 °F (36 5 °C)   Resp 16   Ht 5' 3" (1 6 m)   Wt 64 9 kg (143 lb)   SpO2 98%   BMI 25 33 kg/m²      Mirta Chou is here for her Subsequent Wellness visit  Last Medicare Wellness visit information reviewed, patient interviewed and updates made to the record today  Health Risk Assessment:   Patient rates overall health as good  Patient feels that their physical health rating is same  Eyesight was rated as same  Hearing was rated as same  Patient feels that their emotional and mental health rating is same  Pain experienced in the last 7 days has been some  Patient's pain rating has been 1/10  Patient states that she has experienced no weight loss or gain in last 6 months  Fall Risk Screening: In the past year, patient has experienced: no history of falling in past year      Urinary Incontinence Screening:   Patient has leaked urine accidently in the last six months       Home Safety:  Patient has trouble with stairs inside or outside of their home  Patient has working smoke alarms and has working carbon monoxide detector  Home safety hazards include: none  Nutrition:   Current diet is Regular  Medications:   Patient is currently taking over-the-counter supplements  OTC medications include: see medication list  Patient is able to manage medications  Activities of Daily Living (ADLs)/Instrumental Activities of Daily Living (IADLs):   Walk and transfer into and out of bed and chair?: Yes  Dress and groom yourself?: Yes    Bathe or shower yourself?: Yes    Feed yourself? Yes  Do your laundry/housekeeping?: Yes  Manage your money, pay your bills and track your expenses?: Yes  Make your own meals?: Yes    Do your own shopping?: Yes    Previous Hospitalizations:   Any hospitalizations or ED visits within the last 12 months?: Yes    How many hospitalizations have you had in the last year?: 1-2    Advance Care Planning:   Living will: Yes    Durable POA for healthcare: Yes    Advanced directive: Yes      PREVENTIVE SCREENINGS      Cardiovascular Screening:    General: Screening Not Indicated and History Lipid Disorder      Diabetes Screening:     General: Screening Current      Colorectal Cancer Screening:     General: Risks and Benefits Discussed    Due for: FOBT/FIT      Breast Cancer Screening:     General: Screening Current      Cervical Cancer Screening:    General: Screening Not Indicated      Osteoporosis Screening:    General: Risks and Benefits Discussed    Due for: DXA Axial      Abdominal Aortic Aneurysm (AAA) Screening:        General: Screening Not Indicated      Lung Cancer Screening:     General: Screening Not Indicated      Hepatitis C Screening:    General: Screening Current    Other Counseling Topics:   Car/seat belt/driving safety, skin self-exam, sunscreen and calcium and vitamin D intake and regular weightbearing exercise         [facility] Jo Ann Kay

## 2019-11-20 NOTE — PROGRESS NOTES
Assessment/Plan:    Ulcerative colitis ([facility] )  No episodes since prison   Declines further colonoscopies  Agreeable to do FOBT testing     Hypothyroidism  Check TFT's   On levothyroxine     Essential hypertension  Not on medication, improved with healthy eating and lifestyle changes    Hypercholesterolemia  On statin  Check lipids       Diagnoses and all orders for this visit:    Chest discomfort  Comments:  ekg NSR HR 88   since pain is reproducible on exam, suspect it is muscular  advised to go to the ER if worsens/persists  check labs, including thyroid     Orders:  -     POCT ECG    Essential hypertension    Hypothyroidism, unspecified type    Medicare annual wellness visit, subsequent    Hypercholesterolemia    Ulcerative colitis with complication, unspecified location ([facility] )  -     Occult blood 1-3, stool; Future    Screening for colon cancer  -     Occult blood 1-3, stool; Future    Screening for osteoporosis  -     DXA bone density spine hip and pelvis; Future    Vertigo  -     meclizine (ANTIVERT) 25 mg tablet; Take 1 tablet (25 mg total) by mouth every 8 (eight) hours    Overweight (BMI 25 0-29  9)    Other orders  -     Cancel: TSH, 3rd generation with Free T4 reflex; Future  -     Cancel: Hemoglobin A1C; Future  -     Cancel: Lipid Panel with Direct LDL reflex; Future  -     Cancel: Comprehensive metabolic panel; Future  -     Cancel: Mammo screening bilateral w cad; Future          Subjective:      Patient ID: Cheng Palacios is a 81 y o  female      Here today for chest discomfort    Adrian[place] has been twinges of chest pain over the last few days  Pain comes and goes   She denies any heavy lifting or injury    Pain is reproducible with palpation   Denies any shortness of breath or exercise intolerance   She denies any nausea, jaw pain, radiating pain down her arm     Today she also started with vertigo  She's had this in the past   She has mild room spinning sensation  Denies headache, balance/speech issues, weakness          The following portions of the patient's history were reviewed and updated as appropriate: allergies, current medications, past family history, past medical history, past social history, past surgical history and problem list     Review of Systems   Constitutional: Negative for activity change, appetite change, fatigue, fever and unexpected weight change  HENT: Negative for congestion  Eyes: Negative for visual disturbance  Respiratory: Negative for cough, chest tightness, shortness of breath and wheezing  Cardiovascular: Positive for chest pain  Negative for palpitations and leg swelling  Gastrointestinal: Negative for abdominal pain, blood in stool, diarrhea, nausea and vomiting  Genitourinary: Negative for difficulty urinating  Musculoskeletal: Negative for arthralgias  Skin: Negative for rash  Neurological: Positive for dizziness  Negative for tremors, syncope, facial asymmetry, speech difficulty, weakness, light-headedness, numbness and headaches  Psychiatric/Behavioral: Negative for sleep disturbance  Objective:      /76   Pulse 95   Temp 97 7 °F (36 5 °C)   Resp 16   Ht 5' 3" (1 6 m)   Wt 64 9 kg (143 lb)   SpO2 98%   BMI 25 33 kg/m²          Physical Exam   Constitutional: She is oriented to person, place, and time  She appears well-developed and well-nourished  HENT:   Head: Normocephalic and atraumatic  Mouth/Throat: Oropharynx is clear and moist    Eyes: Pupils are equal, round, and reactive to light  Conjunctivae are normal    Neck: No thyromegaly present  Cardiovascular: Normal rate, regular rhythm, normal heart sounds and intact distal pulses  Pulmonary/Chest: Effort normal and breath sounds normal    Abdominal: Soft  Bowel sounds are normal    Musculoskeletal: Normal range of motion  She exhibits no edema  Lymphadenopathy:     She has no cervical adenopathy     Neurological: She is alert and oriented to person, place, and time  She has normal strength  No cranial nerve deficit or sensory deficit  Coordination and gait normal    Skin: Skin is warm and dry  Psychiatric: She has a normal mood and affect  Her behavior is normal    Vitals reviewed  BMI Counseling: Body mass index is 25 33 kg/m²  The BMI is above normal  Nutrition recommendations include decreasing portion sizes, consuming healthier snacks, limiting drinks that contain sugar and increasing intake of lean protein  Exercise recommendations include exercising 3-5 times per week

## 2019-11-24 LAB
ALBUMIN SERPL-MCNC: 4 G/DL (ref 3.6–5.1)
ALBUMIN/GLOB SERPL: 1.3 (CALC) (ref 1–2.5)
ALP SERPL-CCNC: 79 U/L (ref 33–130)
ALT SERPL-CCNC: 17 U/L (ref 6–29)
AST SERPL-CCNC: 20 U/L (ref 10–35)
BILIRUB SERPL-MCNC: 0.5 MG/DL (ref 0.2–1.2)
BUN SERPL-MCNC: 17 MG/DL (ref 7–25)
BUN/CREAT SERPL: NORMAL (CALC) (ref 6–22)
CALCIUM SERPL-MCNC: 9.6 MG/DL (ref 8.6–10.4)
CHLORIDE SERPL-SCNC: 102 MMOL/L (ref 98–110)
CHOLEST SERPL-MCNC: 159 MG/DL
CHOLEST/HDLC SERPL: 4.2 (CALC)
CO2 SERPL-SCNC: 32 MMOL/L (ref 20–32)
CREAT SERPL-MCNC: 0.92 MG/DL (ref 0.5–0.99)
GLOBULIN SER CALC-MCNC: 3 G/DL (CALC) (ref 1.9–3.7)
GLUCOSE SERPL-MCNC: 96 MG/DL (ref 65–99)
HBA1C MFR BLD: 6 % OF TOTAL HGB
HDLC SERPL-MCNC: 38 MG/DL
LDLC SERPL CALC-MCNC: 98 MG/DL (CALC)
NONHDLC SERPL-MCNC: 121 MG/DL (CALC)
POTASSIUM SERPL-SCNC: 4.5 MMOL/L (ref 3.5–5.3)
PROT SERPL-MCNC: 7 G/DL (ref 6.1–8.1)
SL AMB EGFR AFRICAN AMERICAN: 75 ML/MIN/1.73M2
SL AMB EGFR NON AFRICAN AMERICAN: 64 ML/MIN/1.73M2
SODIUM SERPL-SCNC: 141 MMOL/L (ref 135–146)
TRIGL SERPL-MCNC: 130 MG/DL
TSH SERPL-ACNC: 3.14 MIU/L (ref 0.4–4.5)

## 2019-12-02 DIAGNOSIS — Z01.84 IMMUNITY STATUS TESTING: Primary | ICD-10-CM

## 2020-02-24 ENCOUNTER — TELEPHONE (OUTPATIENT)
Dept: NEUROSURGERY | Facility: CLINIC | Age: 70
End: 2020-02-24

## 2020-02-24 ENCOUNTER — TRANSCRIBE ORDERS (OUTPATIENT)
Dept: NEUROSURGERY | Facility: CLINIC | Age: 70
End: 2020-02-24

## 2020-02-24 ENCOUNTER — OFFICE VISIT (OUTPATIENT)
Dept: INTERNAL MEDICINE CLINIC | Facility: CLINIC | Age: 70
End: 2020-02-24
Payer: MEDICARE

## 2020-02-24 VITALS
DIASTOLIC BLOOD PRESSURE: 76 MMHG | SYSTOLIC BLOOD PRESSURE: 110 MMHG | OXYGEN SATURATION: 97 % | TEMPERATURE: 97.8 F | WEIGHT: 146 LBS | BODY MASS INDEX: 25.87 KG/M2 | HEART RATE: 88 BPM | HEIGHT: 63 IN

## 2020-02-24 DIAGNOSIS — Z86.018 HISTORY OF ACOUSTIC NEUROMA: Primary | ICD-10-CM

## 2020-02-24 DIAGNOSIS — R73.03 PREDIABETES: ICD-10-CM

## 2020-02-24 DIAGNOSIS — K51.919 ULCERATIVE COLITIS WITH COMPLICATION, UNSPECIFIED LOCATION (HCC): ICD-10-CM

## 2020-02-24 DIAGNOSIS — E03.9 HYPOTHYROIDISM, UNSPECIFIED TYPE: ICD-10-CM

## 2020-02-24 DIAGNOSIS — I10 ESSENTIAL HYPERTENSION: ICD-10-CM

## 2020-02-24 DIAGNOSIS — H81.10 BENIGN PAROXYSMAL VERTIGO, UNSPECIFIED LATERALITY: Primary | ICD-10-CM

## 2020-02-24 DIAGNOSIS — Z13.0 SCREENING FOR DEFICIENCY ANEMIA: ICD-10-CM

## 2020-02-24 DIAGNOSIS — E78.00 HYPERCHOLESTEROLEMIA: ICD-10-CM

## 2020-02-24 DIAGNOSIS — D33.3 BENIGN NEOPLASM OF CRANIAL NERVE (HCC): ICD-10-CM

## 2020-02-24 PROCEDURE — 99214 OFFICE O/P EST MOD 30 MIN: CPT | Performed by: NURSE PRACTITIONER

## 2020-02-24 PROCEDURE — 3074F SYST BP LT 130 MM HG: CPT | Performed by: NURSE PRACTITIONER

## 2020-02-24 PROCEDURE — 1160F RVW MEDS BY RX/DR IN RCRD: CPT | Performed by: NURSE PRACTITIONER

## 2020-02-24 PROCEDURE — 3008F BODY MASS INDEX DOCD: CPT | Performed by: NURSE PRACTITIONER

## 2020-02-24 PROCEDURE — 3078F DIAST BP <80 MM HG: CPT | Performed by: NURSE PRACTITIONER

## 2020-02-24 PROCEDURE — 1036F TOBACCO NON-USER: CPT | Performed by: NURSE PRACTITIONER

## 2020-02-24 NOTE — PROGRESS NOTES
Assessment/Plan:    Ulcerative colitis ([facility] )  No issues since alf  Ordered FOBT, declined further colonoscopies     Hypothyroidism  Euthyroid  On levothyroxine    Essential hypertension  Diet controlled, no medication       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[facility] )  Follow up with [facility] neurosurgery   Schedule MRI due to vertigo     Hypercholesterolemia  On statin   Check lipid panel    Prediabetes  Reduce sugars and carbohydrates  Check A1C       Diagnoses and all orders for this visit:    Benign paroxysmal vertigo, unspecified laterality  Comments:  normal neuro exam   referral provided for vestibular therapy   meclizine PRN   Orders:  -     Ambulatory referral to Physical Therapy; Future    Prediabetes  -     Hemoglobin A1C; Future    Essential hypertension  -     Comprehensive metabolic panel; Future    Hypercholesterolemia  -     Lipid Panel with Direct LDL reflex; Future    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HCC)    Hypothyroidism, unspecified type  -     TSH, 3rd generation with Free T4 reflex; Future    Ulcerative colitis with complication, unspecified location [facility])    Screening for deficiency anemia  -     CBC and differential; Future          Subjective:      Patient ID: Emerita Siddiqi is a 81 y o  female      Here today for follow up  Adonis Molina is doing ok  She continues to report ongoing vertigo since her last appointment   She cannot lay flat, has to watch the way she turns her head or else she develops a room spinning sensation   She denies any headaches but does have some associated nausea  She has a left sided acoustic neuroma and she thought it was from that so she notified her neurosurgeon at [facility] who ordered an MRI and recommended vestibular therapy      Her chest pain from the previous visit resolved               The following portions of the patient's history were reviewed and updated as appropriate: allergies, current medications, past family history, past medical history, past social history, past surgical history and problem list     Review of Systems   Constitutional: Negative for activity change, appetite change, fatigue and unexpected weight change  HENT: Negative for congestion, ear pain, facial swelling, hearing loss, rhinorrhea and sore throat  Eyes: Negative for photophobia and visual disturbance  Respiratory: Negative for cough, chest tightness, shortness of breath and wheezing  Cardiovascular: Negative for chest pain, palpitations and leg swelling  Gastrointestinal: Positive for nausea  Negative for abdominal pain, blood in stool, constipation, diarrhea and vomiting  Genitourinary: Negative for difficulty urinating  Musculoskeletal: Negative for arthralgias  Skin: Negative for rash  Neurological: Positive for dizziness  Negative for syncope, facial asymmetry, speech difficulty, weakness, light-headedness and headaches  Psychiatric/Behavioral: Negative for sleep disturbance  Objective:      /76   Pulse 88   Temp 97 8 °F (36 6 °C)   Ht 5' 3" (1 6 m)   Wt 66 2 kg (146 lb)   SpO2 97%   BMI 25 86 kg/m²          Physical Exam   Constitutional: She is oriented to person, place, and time  She appears well-developed and well-nourished  HENT:   Head: Normocephalic and atraumatic  Right Ear: External ear normal    Left Ear: External ear normal    Mouth/Throat: Oropharynx is clear and moist    Eyes: Pupils are equal, round, and reactive to light  Conjunctivae are normal    Neck: No thyromegaly present  Cardiovascular: Normal rate, regular rhythm, normal heart sounds and intact distal pulses  Pulmonary/Chest: Effort normal and breath sounds normal    Abdominal: Soft  Bowel sounds are normal    Musculoskeletal: Normal range of motion  She exhibits no edema  Lymphadenopathy:     She has no cervical adenopathy  Neurological: She is alert and oriented to person, place, and time  No cranial nerve deficit or sensory deficit   Coordination normal    Skin: Skin is warm and dry  Psychiatric: She has a normal mood and affect  Her behavior is normal    Vitals reviewed

## 2020-02-24 NOTE — TELEPHONE ENCOUNTER
Dr Darrow Kayser requested for my assistance to schedule patient to be seen by him at [facility] or [facility] to establish care  This is a patient of Dr Samuel Alcantara from [facility]  Dr Obey Johnson reached out to Dr Darrow Kayser requesting for our office to reach out to the patient  Dr Darrow Kayser is aware patient does not have recent imaging  He will order imaging at that appointment if needed  Called the patient  Scheduled her to be seen on 3/6/20 by Dr Darrow Kayser at [facility]  Patient was appreciative

## 2020-02-24 NOTE — ASSESSMENT & PLAN NOTE
Follow up with [facility] neurosurgery   Schedule MRI due to vertigo

## 2020-03-06 ENCOUNTER — CONSULT (OUTPATIENT)
Dept: NEUROSURGERY | Facility: CLINIC | Age: 70
End: 2020-03-06
Payer: MEDICARE

## 2020-03-06 ENCOUNTER — DOCUMENTATION (OUTPATIENT)
Dept: NEUROSURGERY | Facility: CLINIC | Age: 70
End: 2020-03-06

## 2020-03-06 VITALS
TEMPERATURE: 97.9 F | RESPIRATION RATE: 16 BRPM | WEIGHT: 147 LBS | BODY MASS INDEX: 26.05 KG/M2 | HEIGHT: 63 IN | HEART RATE: 98 BPM | SYSTOLIC BLOOD PRESSURE: 134 MMHG | DIASTOLIC BLOOD PRESSURE: 81 MMHG

## 2020-03-06 DIAGNOSIS — H81.10 BENIGN PAROXYSMAL POSITIONAL VERTIGO, UNSPECIFIED LATERALITY: ICD-10-CM

## 2020-03-06 DIAGNOSIS — D33.3 BENIGN NEOPLASM OF CRANIAL NERVE (HCC): ICD-10-CM

## 2020-03-06 DIAGNOSIS — Z86.018 HISTORY OF ACOUSTIC NEUROMA: ICD-10-CM

## 2020-03-06 DIAGNOSIS — D33.3 LEFT ACOUSTIC NEUROMA (HCC): Primary | ICD-10-CM

## 2020-03-06 DIAGNOSIS — Z92.3 HISTORY OF RADIATION THERAPY: ICD-10-CM

## 2020-03-06 PROCEDURE — 3079F DIAST BP 80-89 MM HG: CPT | Performed by: NEUROLOGICAL SURGERY

## 2020-03-06 PROCEDURE — 3008F BODY MASS INDEX DOCD: CPT | Performed by: NEUROLOGICAL SURGERY

## 2020-03-06 PROCEDURE — 99204 OFFICE O/P NEW MOD 45 MIN: CPT | Performed by: NEUROLOGICAL SURGERY

## 2020-03-06 PROCEDURE — 3075F SYST BP GE 130 - 139MM HG: CPT | Performed by: NEUROLOGICAL SURGERY

## 2020-03-06 PROCEDURE — 1160F RVW MEDS BY RX/DR IN RCRD: CPT | Performed by: NEUROLOGICAL SURGERY

## 2020-03-06 PROCEDURE — 1036F TOBACCO NON-USER: CPT | Performed by: NEUROLOGICAL SURGERY

## 2020-03-06 NOTE — PROGRESS NOTES
Neurosurgery Office Note  Alexandra Abler 81 y o  female MRN: [id number]      Assessment/Plan      Diagnoses and all orders for this visit:    Left acoustic neuroma [facility])    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[address] )    History of radiation therapy    History of acoustic neuroma    Benign paroxysmal positional vertigo, unspecified laterality        Discussion:     61-year-old woman referred by Dr Madhu Shaikh  She has a known history of a left acoustic neuroma that was treated with IMRT in 2007, 25 fractions, with Dr Eyad Osborn  She has continued to have f/u with Dr Madhu Sahikh with scheduled surveillance MRI studies  He last saw her 2019, and they agreed on a follow-up study in 5 years  However, in 11/2019,  After receiving a flu shot, she laid back and suffered a bout of vertigo  Since that time, if she lays straight back supine, she will have vertigo  She does not experience this if she lays on either side or prone  She contacted Dr Madhu Shaikh to suggested she return for follow-up  She expressed reservation at traveling to [place]  Dr Madhu Shaikh contacted us to arrange follow-up  The patient was also seen by her PCP, who referred her for vestibular therapy  On exam, the patient has no obvious nystagmus  She has no facial weakness  She hears finger rub quite well on the right, but not on the left  MRI scan brain from 2019 shows her residual left-sided acoustic neuroma, which is smaller than it was at the time of treatment back in 2007  With regard to her acoustic neuroma, I am happy to assume follow-up care  I have no disagreement with the planned next follow-up MRI scan to be 5 years from the prior, I e  Tentatively 2024  We will schedule follow-up with the patient for that time frame, and all her closer to the date to schedule the MRI scan  Her episodes of vertigo sound most consistent with benign paroxysmal positional vertigo    She was referred to this tubular therapy, and I feel this is an appropriate next step  We will call to help arrange that appointment  The patient will contact us should this not help improve her symptoms, at which point we may consider referral to ENT  Otherwise I will see the patient back after her next MRI brain  Metrics:  EQ5D5L 1 000, VA S 85, KPS 90, ECOG 0     CHIEF COMPLAINT    Chief Complaint   Patient presents with    Consult     Consult to establish care close to [facility]; referred by Dr Rajesh Madera    History of Present Illness     81y o  year old female     HPI    See Discussion    REVIEW OF SYSTEMS    Review of Systems   Constitutional: Negative  HENT: Positive for ear pain (Occasional ice pick pain in area of left ear), hearing loss (left side) and tinnitus (left, constant)  Eyes: Negative  Respiratory: Negative  Cardiovascular: Negative  Gastrointestinal: Negative  Endocrine: Negative  Genitourinary: Negative  Musculoskeletal: Negative  Skin: Negative  Allergic/Immunologic: Positive for environmental allergies  Neurological: Positive for dizziness (vertigo)  Negative for headaches  Occasional ice pick pain in area of left ear   Hematological: Negative  Psychiatric/Behavioral: Negative  Occasional trouble word finding, memory difficulty with aging         Meds/Allergies     Current Outpatient Medications   Medication Sig Dispense Refill    atorvastatin (LIPITOR) 20 mg tablet Take 1 tablet (20 mg total) by mouth daily 90 tablet 1    BIOTIN PO Take 1,000 mg by mouth daily      Cholecalciferol (VITAMIN D) 2000 units CAPS Take 2,000 Units by mouth daily       levothyroxine 88 mcg tablet Take 1 tablet (88 mcg total) by mouth daily 90 tablet 1    Multiple Vitamin (MULTIVITAMIN) tablet Take 1 tablet by mouth daily      b complex vitamins tablet Take 1 tablet by mouth daily       No current facility-administered medications for this visit          Allergies   Allergen Reactions    Erythromycin Rash       PAST HISTORY    Past Medical History:   Diagnosis Date    Acoustic neuroma ([address] )     left ear, decreased hearing    Arthritis     right knee    Disease of thyroid gland     Hyperlipidemia     Urethral hypermobility     Uterine prolapse        Past Surgical History:   Procedure Laterality Date    COLONOSCOPY      [place] CMBND ANTERPOST COLPORRAPHY W/CYSTO N/A 1/8/2019    Procedure: A&P COLPORRHAPHY;  Surgeon: Iva Weber MD;  [facility];  Service: UroGynecology           GA CYSTOURETHROSCOPY N/A 1/8/2019    Procedure: Shira Moroni;  Surgeon: Iva Weber MD;  [facility];  Service: UroGynecology           GA REVAGINAL PROLAPSE,SACROSP LIG N/A 1/8/2019    Procedure: ANTERIOR VE COLPOPEXY;  Surgeon: Iav Weber MD;  [facility];  Service: UroGynecology           GA SLING OPER STRES INCONTINENCE N/A 1/8/2019    Procedure: PUBOVAGINAL SLING;  Surgeon: Iva Weber MD;  [facility];  Service: UroGynecology           TUBAL LIGATION      WISDOM TOOTH EXTRACTION         Social History     Tobacco Use    Smoking status: Never Smoker    Smokeless tobacco: Never Used   Substance Use Topics    Alcohol use: Yes     Comment: rare    Drug use: No       Family History   Problem Relation Age of Onset    Arthritis Family     Diabetes Family     Hypertension Family     Osteoporosis Family     Kidney disease Family     Thyroid disease Family     Hypertension Mother     Thyroid disease Mother     Diabetes Father     Asthma Father     Lung disease Father     Thyroid disease Sister     Thyroid disease Daughter     Mental illness Neg Hx     Substance Abuse Neg Hx          The following portions of the patient's history were reviewed in this encounter and updated as appropriate: Past medical, surgical, family, and social history, as well as medications, allergies, and review of systems          EXAM    Vitals:Blood pressure 134/81, pulse 98, temperature 97 9 °F (36 6 °C), temperature source Tympanic, resp  rate 16, height 5' 3" (1 6 m), weight 66 7 kg (147 lb)  ,Body mass index is 26 04 kg/m²  Physical Exam   Constitutional: She is oriented to person, place, and time  She appears well-developed and well-nourished  HENT:   Head: Normocephalic  Eyes: No scleral icterus  Neck: Neck supple  Cardiovascular: Normal rate  Pulmonary/Chest: Effort normal    Abdominal: Soft  Neurological: She is alert and oriented to person, place, and time  GCS eye subscore is 4  GCS verbal subscore is 5  GCS motor subscore is 6  Skin: Skin is warm and dry  Psychiatric: She has a normal mood and affect  Her speech is normal and behavior is normal    Vitals reviewed  Neurologic Exam     Mental Status   Oriented to person, place, and time  Attention: normal    Speech: speech is normal   Level of consciousness: alert    Cranial Nerves     CN III, IV, VI   Nystagmus: none   Ophthalmoparesis: none    CN VII   Facial expression full, symmetric  CN VIII   Hearing: impaired ( no finger rub heard on left)    Motor Exam   Muscle bulk: normal  Overall muscle tone: normal  Moves all extremities, grossly normal     Gait, Coordination, and Reflexes     Tremor   Resting tremor: absent  Intention tremor: absent  Action tremor: absent  No aids  MEDICAL DECISION MAKING    Imaging Studies:      MRI brain 2019, compared with priors going back to 2008    I have personally reviewed pertinent reports     and I have personally reviewed pertinent films in PACS

## 2020-03-06 NOTE — PROGRESS NOTES
Patient brought an amount of 8 discs from [address] [place] [address]  for her appointment today:    5/15/19 - MRI BRAIN IAC W/YUMIKO  5/11/16 - MRI BRAIN / IAC W/YUMIKO  6/4/14 - MRI BRAIN / IAC  7/5/12 - MRI BRAIN & IAC  7/7/11 - MRI BRAIN / IAC  7/1/10 - MRI BRAIN / IAC W/YUMIKO  7/8/09 - MRI BRAIN & IAC  6/11/08 - MRI BRAIN / IAC W/YUMIKO    All disc have been loaded to PACS system and return to patient at appointment

## 2020-03-23 DIAGNOSIS — E03.9 HYPOTHYROIDISM, UNSPECIFIED TYPE: ICD-10-CM

## 2020-03-23 DIAGNOSIS — E78.00 HYPERCHOLESTEROLEMIA: ICD-10-CM

## 2020-03-23 RX ORDER — LEVOTHYROXINE SODIUM 88 UG/1
88 TABLET ORAL DAILY
Qty: 90 TABLET | Refills: 1 | Status: SHIPPED | OUTPATIENT
Start: 2020-03-23 | End: 2020-08-23

## 2020-03-23 RX ORDER — ATORVASTATIN CALCIUM 20 MG/1
20 TABLET, FILM COATED ORAL DAILY
Qty: 90 TABLET | Refills: 1 | Status: SHIPPED | OUTPATIENT
Start: 2020-03-23 | End: 2020-08-23

## 2020-06-06 ENCOUNTER — PATIENT MESSAGE (OUTPATIENT)
Dept: INTERNAL MEDICINE CLINIC | Facility: CLINIC | Age: 70
End: 2020-06-06

## 2020-06-08 ENCOUNTER — OFFICE VISIT (OUTPATIENT)
Dept: INTERNAL MEDICINE CLINIC | Facility: CLINIC | Age: 70
End: 2020-06-08
Payer: MEDICARE

## 2020-06-08 ENCOUNTER — HOSPITAL ENCOUNTER (OUTPATIENT)
Dept: RADIOLOGY | Facility: HOSPITAL | Age: 70
Discharge: HOME | End: 2020-06-08
Payer: MEDICARE

## 2020-06-08 VITALS
BODY MASS INDEX: 27.42 KG/M2 | SYSTOLIC BLOOD PRESSURE: 128 MMHG | HEART RATE: 92 BPM | OXYGEN SATURATION: 96 % | TEMPERATURE: 97.8 F | HEIGHT: 62 IN | DIASTOLIC BLOOD PRESSURE: 78 MMHG | WEIGHT: 149 LBS

## 2020-06-08 DIAGNOSIS — M25.572 ACUTE LEFT ANKLE PAIN: Primary | ICD-10-CM

## 2020-06-08 DIAGNOSIS — M25.572 ACUTE LEFT ANKLE PAIN: ICD-10-CM

## 2020-06-08 PROCEDURE — 1036F TOBACCO NON-USER: CPT | Performed by: NURSE PRACTITIONER

## 2020-06-08 PROCEDURE — 3074F SYST BP LT 130 MM HG: CPT | Performed by: NURSE PRACTITIONER

## 2020-06-08 PROCEDURE — 99213 OFFICE O/P EST LOW 20 MIN: CPT | Performed by: NURSE PRACTITIONER

## 2020-06-08 PROCEDURE — 1160F RVW MEDS BY RX/DR IN RCRD: CPT | Performed by: NURSE PRACTITIONER

## 2020-06-08 PROCEDURE — 3078F DIAST BP <80 MM HG: CPT | Performed by: NURSE PRACTITIONER

## 2020-06-08 PROCEDURE — 3008F BODY MASS INDEX DOCD: CPT | Performed by: NURSE PRACTITIONER

## 2020-06-08 PROCEDURE — 73610 X-RAY EXAM OF ANKLE: CPT

## 2020-06-10 ENCOUNTER — TELEPHONE (OUTPATIENT)
Dept: INTERNAL MEDICINE CLINIC | Facility: CLINIC | Age: 70
End: 2020-06-10

## 2020-06-12 ENCOUNTER — OFFICE VISIT (OUTPATIENT)
Dept: OBGYN CLINIC | Facility: CLINIC | Age: 70
End: 2020-06-12
Payer: MEDICARE

## 2020-06-12 VITALS
HEIGHT: 62 IN | WEIGHT: 149 LBS | HEART RATE: 84 BPM | BODY MASS INDEX: 27.42 KG/M2 | SYSTOLIC BLOOD PRESSURE: 148 MMHG | DIASTOLIC BLOOD PRESSURE: 81 MMHG

## 2020-06-12 DIAGNOSIS — M25.572 ACUTE LEFT ANKLE PAIN: ICD-10-CM

## 2020-06-12 DIAGNOSIS — S93.492A SPRAIN OF ANTERIOR TALOFIBULAR LIGAMENT OF LEFT ANKLE, INITIAL ENCOUNTER: Primary | ICD-10-CM

## 2020-06-12 PROCEDURE — 99203 OFFICE O/P NEW LOW 30 MIN: CPT | Performed by: ORTHOPAEDIC SURGERY

## 2020-08-05 LAB
ALBUMIN SERPL-MCNC: 3.9 G/DL (ref 3.6–5.1)
ALBUMIN/GLOB SERPL: 1.6 (CALC) (ref 1–2.5)
ALP SERPL-CCNC: 70 U/L (ref 37–153)
ALT SERPL-CCNC: 22 U/L (ref 6–29)
AST SERPL-CCNC: 26 U/L (ref 10–35)
BASOPHILS # BLD AUTO: 60 CELLS/UL (ref 0–200)
BASOPHILS NFR BLD AUTO: 1 %
BILIRUB SERPL-MCNC: 0.7 MG/DL (ref 0.2–1.2)
BUN SERPL-MCNC: 17 MG/DL (ref 7–25)
BUN/CREAT SERPL: ABNORMAL (CALC) (ref 6–22)
CALCIUM SERPL-MCNC: 9.2 MG/DL (ref 8.6–10.4)
CHLORIDE SERPL-SCNC: 103 MMOL/L (ref 98–110)
CHOLEST SERPL-MCNC: 159 MG/DL
CHOLEST/HDLC SERPL: 4.4 (CALC)
CO2 SERPL-SCNC: 33 MMOL/L (ref 20–32)
CREAT SERPL-MCNC: 0.81 MG/DL (ref 0.5–0.99)
EOSINOPHIL # BLD AUTO: 60 CELLS/UL (ref 15–500)
EOSINOPHIL NFR BLD AUTO: 1 %
ERYTHROCYTE [DISTWIDTH] IN BLOOD BY AUTOMATED COUNT: 12.9 % (ref 11–15)
GLOBULIN SER CALC-MCNC: 2.5 G/DL (CALC) (ref 1.9–3.7)
GLUCOSE SERPL-MCNC: 100 MG/DL (ref 65–99)
HBA1C MFR BLD: 5.7 % OF TOTAL HGB
HCT VFR BLD AUTO: 39.7 % (ref 35–45)
HDLC SERPL-MCNC: 36 MG/DL
HGB BLD-MCNC: 13 G/DL (ref 11.7–15.5)
LDLC SERPL CALC-MCNC: 99 MG/DL (CALC)
LYMPHOCYTES # BLD AUTO: 2520 CELLS/UL (ref 850–3900)
LYMPHOCYTES NFR BLD AUTO: 42 %
MCH RBC QN AUTO: 30.7 PG (ref 27–33)
MCHC RBC AUTO-ENTMCNC: 32.7 G/DL (ref 32–36)
MCV RBC AUTO: 93.9 FL (ref 80–100)
MONOCYTES # BLD AUTO: 624 CELLS/UL (ref 200–950)
MONOCYTES NFR BLD AUTO: 10.4 %
NEUTROPHILS # BLD AUTO: 2736 CELLS/UL (ref 1500–7800)
NEUTROPHILS NFR BLD AUTO: 45.6 %
NONHDLC SERPL-MCNC: 123 MG/DL (CALC)
PLATELET # BLD AUTO: 248 THOUSAND/UL (ref 140–400)
PMV BLD REES-ECKER: 11.2 FL (ref 7.5–12.5)
POTASSIUM SERPL-SCNC: 4.3 MMOL/L (ref 3.5–5.3)
PROT SERPL-MCNC: 6.4 G/DL (ref 6.1–8.1)
RBC # BLD AUTO: 4.23 MILLION/UL (ref 3.8–5.1)
SL AMB EGFR AFRICAN AMERICAN: 86 ML/MIN/1.73M2
SL AMB EGFR NON AFRICAN AMERICAN: 75 ML/MIN/1.73M2
SODIUM SERPL-SCNC: 141 MMOL/L (ref 135–146)
TRIGL SERPL-MCNC: 137 MG/DL
TSH SERPL-ACNC: 1.77 MIU/L (ref 0.4–4.5)
WBC # BLD AUTO: 6 THOUSAND/UL (ref 3.8–10.8)

## 2020-08-17 DIAGNOSIS — E78.00 HYPERCHOLESTEROLEMIA: ICD-10-CM

## 2020-08-17 DIAGNOSIS — E03.9 HYPOTHYROIDISM, UNSPECIFIED TYPE: ICD-10-CM

## 2020-08-23 RX ORDER — LEVOTHYROXINE SODIUM 88 UG/1
TABLET ORAL
Qty: 90 TABLET | Refills: 1 | Status: SHIPPED | OUTPATIENT
Start: 2020-08-23 | End: 2021-04-07 | Stop reason: SDUPTHER

## 2020-08-23 RX ORDER — ATORVASTATIN CALCIUM 20 MG/1
TABLET, FILM COATED ORAL
Qty: 90 TABLET | Refills: 1 | Status: SHIPPED | OUTPATIENT
Start: 2020-08-23 | End: 2021-04-07 | Stop reason: SDUPTHER

## 2020-08-24 ENCOUNTER — OFFICE VISIT (OUTPATIENT)
Dept: INTERNAL MEDICINE CLINIC | Facility: CLINIC | Age: 70
End: 2020-08-24
Payer: MEDICARE

## 2020-08-24 VITALS
HEART RATE: 82 BPM | WEIGHT: 144.8 LBS | DIASTOLIC BLOOD PRESSURE: 80 MMHG | RESPIRATION RATE: 18 BRPM | SYSTOLIC BLOOD PRESSURE: 114 MMHG | BODY MASS INDEX: 26.65 KG/M2 | HEIGHT: 62 IN | OXYGEN SATURATION: 98 % | TEMPERATURE: 97.8 F

## 2020-08-24 DIAGNOSIS — K51.919 ULCERATIVE COLITIS WITH COMPLICATION, UNSPECIFIED LOCATION (HCC): ICD-10-CM

## 2020-08-24 DIAGNOSIS — E55.9 VITAMIN D DEFICIENCY: ICD-10-CM

## 2020-08-24 DIAGNOSIS — D33.3 BENIGN NEOPLASM OF CRANIAL NERVE (HCC): ICD-10-CM

## 2020-08-24 DIAGNOSIS — E78.00 HYPERCHOLESTEROLEMIA: Primary | ICD-10-CM

## 2020-08-24 DIAGNOSIS — D33.3 LEFT ACOUSTIC NEUROMA (HCC): ICD-10-CM

## 2020-08-24 DIAGNOSIS — H81.10 BENIGN PAROXYSMAL POSITIONAL VERTIGO, UNSPECIFIED LATERALITY: ICD-10-CM

## 2020-08-24 DIAGNOSIS — E03.9 HYPOTHYROIDISM, UNSPECIFIED TYPE: ICD-10-CM

## 2020-08-24 DIAGNOSIS — R73.03 PREDIABETES: ICD-10-CM

## 2020-08-24 PROCEDURE — 3074F SYST BP LT 130 MM HG: CPT | Performed by: NURSE PRACTITIONER

## 2020-08-24 PROCEDURE — 3008F BODY MASS INDEX DOCD: CPT | Performed by: NURSE PRACTITIONER

## 2020-08-24 PROCEDURE — 3079F DIAST BP 80-89 MM HG: CPT | Performed by: NURSE PRACTITIONER

## 2020-08-24 PROCEDURE — 99214 OFFICE O/P EST MOD 30 MIN: CPT | Performed by: NURSE PRACTITIONER

## 2020-08-24 PROCEDURE — 1036F TOBACCO NON-USER: CPT | Performed by: NURSE PRACTITIONER

## 2020-08-24 PROCEDURE — 1160F RVW MEDS BY RX/DR IN RCRD: CPT | Performed by: NURSE PRACTITIONER

## 2020-08-24 NOTE — PROGRESS NOTES
Assessment/Plan:    Ulcerative colitis ([facility] )  No issues since half-way  Declines any further colonoscopies  Hypothyroidism  Normal TSH  On replacement therapy  Left acoustic neuroma ([facility] )  Last MRI 2019, no changes, due every 5 years  Sees neurosurgery     Benign paroxysmal positional vertigo  Resolved  Hypercholesterolemia  On statin  Encouraged her to exercise 3-5 times/week     Vitamin D deficiency  On daily supplement    Prediabetes  A1C has improved  Continue to watch sugars and carbohydrates     BMI Counseling: Body mass index is 26 48 kg/m²  The BMI is above normal  Nutrition recommendations include reducing portion sizes and decreasing overall calorie intake  Exercise recommendations include exercising 3-5 times per week  Diagnoses and all orders for this visit:    Hypercholesterolemia  -     Comprehensive metabolic panel; Future  -     Lipid Panel with Direct LDL reflex; Future    Prediabetes  -     Hemoglobin A1C; Future    Hypothyroidism, unspecified type    Left acoustic neuroma ([facility] )    Ulcerative colitis with complication, unspecified location [facility])    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HCC)    Vitamin D deficiency  -     Vitamin D 25 hydroxy; Future    Benign paroxysmal positional vertigo, unspecified laterality          Subjective:      Patient ID: Ant Mahajan is a 78 y o  female  Here today for follow up  Gloria Deluca has been doing fairly well   She saw orthopedics for her left ankle pain and was diagnosed with a sprain  She did not start physical therapy, pain has been improving  She has some stiffness in the morning and the ankle feels achy by the end of the day  She has no difficulty bearing weight  She sees neurosurgery for an acoustic neuroma  There has been no changes and she has an MRI every 5 years  She had one last year due to vertigo  The vertigo has since resolved  She has no new issues or concerns today             The following portions of the patient's history were reviewed and updated as appropriate: allergies, current medications, past family history, past medical history, past social history, past surgical history and problem list     Review of Systems   Constitutional: Negative for activity change, appetite change, fatigue, fever and unexpected weight change  Eyes: Negative for visual disturbance  Respiratory: Negative for cough, chest tightness, shortness of breath and wheezing  Cardiovascular: Negative for chest pain, palpitations and leg swelling  Gastrointestinal: Negative for abdominal pain, blood in stool, constipation and diarrhea  Genitourinary: Negative for difficulty urinating  Musculoskeletal: Positive for arthralgias  Some left ankle stiffness at times   Skin: Negative for rash  Neurological: Negative for dizziness, weakness, light-headedness and headaches  Psychiatric/Behavioral: Negative for sleep disturbance  Objective:      /80   Pulse 82   Temp 97 8 °F (36 6 °C)   Resp 18   Ht 5' 2" (1 575 m)   Wt 65 7 kg (144 lb 12 8 oz)   SpO2 98%   BMI 26 48 kg/m²          Physical Exam  Vitals signs reviewed  Constitutional:       Appearance: Normal appearance  She is well-developed  HENT:      Head: Normocephalic and atraumatic  Right Ear: Tympanic membrane, ear canal and external ear normal       Left Ear: Tympanic membrane, ear canal and external ear normal       Mouth/Throat:      Mouth: Mucous membranes are moist       Pharynx: Oropharynx is clear  Eyes:      Conjunctiva/sclera: Conjunctivae normal       Pupils: Pupils are equal, round, and reactive to light  Neck:      Thyroid: No thyromegaly  Cardiovascular:      Rate and Rhythm: Normal rate and regular rhythm  Pulses: Normal pulses  Heart sounds: Normal heart sounds  Pulmonary:      Effort: Pulmonary effort is normal       Breath sounds: Normal breath sounds     Abdominal:      General: Bowel sounds are normal       Palpations: Abdomen is soft  Musculoskeletal: Normal range of motion  Right ankle: She exhibits normal range of motion and no swelling  No tenderness  Lymphadenopathy:      Cervical: No cervical adenopathy  Skin:     General: Skin is warm and dry  Neurological:      Mental Status: She is alert and oriented to person, place, and time     Psychiatric:         Behavior: Behavior normal

## 2020-10-10 ENCOUNTER — CLINICAL SUPPORT (OUTPATIENT)
Dept: INTERNAL MEDICINE CLINIC | Facility: CLINIC | Age: 70
End: 2020-10-10
Payer: MEDICARE

## 2020-10-10 DIAGNOSIS — Z23 ENCOUNTER FOR IMMUNIZATION: ICD-10-CM

## 2020-10-10 PROCEDURE — G0008 ADMIN INFLUENZA VIRUS VAC: HCPCS | Performed by: INTERNAL MEDICINE

## 2020-10-10 PROCEDURE — 90662 IIV NO PRSV INCREASED AG IM: CPT | Performed by: INTERNAL MEDICINE

## 2021-02-16 LAB
25(OH)D3 SERPL-MCNC: 35 NG/ML (ref 30–100)
ALBUMIN SERPL-MCNC: 4 G/DL (ref 3.6–5.1)
ALBUMIN/GLOB SERPL: 1.5 (CALC) (ref 1–2.5)
ALP SERPL-CCNC: 72 U/L (ref 37–153)
ALT SERPL-CCNC: 22 U/L (ref 6–29)
AST SERPL-CCNC: 22 U/L (ref 10–35)
BILIRUB SERPL-MCNC: 0.4 MG/DL (ref 0.2–1.2)
BUN SERPL-MCNC: 17 MG/DL (ref 7–25)
BUN/CREAT SERPL: ABNORMAL (CALC) (ref 6–22)
CALCIUM SERPL-MCNC: 9.6 MG/DL (ref 8.6–10.4)
CHLORIDE SERPL-SCNC: 103 MMOL/L (ref 98–110)
CHOLEST SERPL-MCNC: 156 MG/DL
CHOLEST/HDLC SERPL: 4 (CALC)
CO2 SERPL-SCNC: 33 MMOL/L (ref 20–32)
CREAT SERPL-MCNC: 0.9 MG/DL (ref 0.5–0.99)
GLOBULIN SER CALC-MCNC: 2.7 G/DL (CALC) (ref 1.9–3.7)
GLUCOSE SERPL-MCNC: 99 MG/DL (ref 65–99)
HBA1C MFR BLD: 5.7 % OF TOTAL HGB
HDLC SERPL-MCNC: 39 MG/DL
LDLC SERPL CALC-MCNC: 95 MG/DL (CALC)
NONHDLC SERPL-MCNC: 117 MG/DL (CALC)
POTASSIUM SERPL-SCNC: 4.3 MMOL/L (ref 3.5–5.3)
PROT SERPL-MCNC: 6.7 G/DL (ref 6.1–8.1)
SL AMB EGFR AFRICAN AMERICAN: 76 ML/MIN/1.73M2
SL AMB EGFR NON AFRICAN AMERICAN: 66 ML/MIN/1.73M2
SODIUM SERPL-SCNC: 141 MMOL/L (ref 135–146)
TRIGL SERPL-MCNC: 122 MG/DL

## 2021-02-19 ENCOUNTER — TELEMEDICINE (OUTPATIENT)
Dept: INTERNAL MEDICINE CLINIC | Facility: CLINIC | Age: 71
End: 2021-02-19
Payer: MEDICARE

## 2021-02-19 DIAGNOSIS — Z20.822 EXPOSURE TO COVID-19 VIRUS: ICD-10-CM

## 2021-02-19 DIAGNOSIS — Z20.822 EXPOSURE TO COVID-19 VIRUS: Primary | ICD-10-CM

## 2021-02-19 LAB — SARS-COV-2 RNA RESP QL NAA+PROBE: NEGATIVE

## 2021-02-19 PROCEDURE — U0003 INFECTIOUS AGENT DETECTION BY NUCLEIC ACID (DNA OR RNA); SEVERE ACUTE RESPIRATORY SYNDROME CORONAVIRUS 2 (SARS-COV-2) (CORONAVIRUS DISEASE [COVID-19]), AMPLIFIED PROBE TECHNIQUE, MAKING USE OF HIGH THROUGHPUT TECHNOLOGIES AS DESCRIBED BY CMS-2020-01-R: HCPCS | Performed by: NURSE PRACTITIONER

## 2021-02-19 PROCEDURE — 99214 OFFICE O/P EST MOD 30 MIN: CPT | Performed by: NURSE PRACTITIONER

## 2021-02-19 PROCEDURE — U0005 INFEC AGEN DETEC AMPLI PROBE: HCPCS | Performed by: NURSE PRACTITIONER

## 2021-02-19 NOTE — PROGRESS NOTES
COVID-19 Virtual Visit     Assessment/Plan:    Problem List Items Addressed This Visit     None      Visit Diagnoses     Exposure to COVID-19 virus    -  Primary    Relevant Orders    Novel Coronavirus (Covid-19),PCR [facility] - Collected at Mobile [facility] or [facility]         Disposition:     I referred patient to one of our centralized sites for a COVID-19 swab  Self quarantine until results  I have spent 6 minutes directly with the patient  Greater than 50% of this time was spent in counseling/coordination of care regarding: patient and family education  Encounter provider ELAYNE Smith    Provider located at [address] [place] [id number]    Recent Visits  No visits were found meeting these conditions  Showing recent visits within past 7 days and meeting all other requirements     Today's Visits  Date Type Provider Dept   02/19/21 [facility] 185, [address] Internal Med   Showing today's visits and meeting all other requirements     Future Appointments  No visits were found meeting these conditions  Showing future appointments within next 150 days and meeting all other requirements      This virtual check-in was done via [employer] and patient was informed that this is a secure, HIPAA-compliant platform  She agrees to proceed  Patient agrees to participate in a virtual check in via telephone or video visit instead of presenting to the office to address urgent/immediate medical needs  Patient is aware this is a billable service  After connecting through [facility], the patient was identified by name and date of birth  Marty Giron was informed that this was a telemedicine visit and that the exam was being conducted confidentially over secure lines  My office door was closed  No one else was in the room   Matry Giron acknowledged consent and understanding of privacy and security of the telemedicine visit  I informed the patient that I have reviewed her record in Epic and presented the opportunity for her to ask any questions regarding the visit today  The patient agreed to participate  Subjective:   Gabriella Oconnor is a 78 y o  female who is concerned about COVID-19  Patient is currently asymptomatic  Patient denies fever, chills, fatigue, malaise, congestion, rhinorrhea, sore throat, anosmia, loss of taste, cough, shortness of breath, chest tightness, abdominal pain, nausea, vomiting, diarrhea, myalgias and headaches  Exposure:   Contact with a person who is under investigation (PUI) for or who is positive for COVID-19 within the last 14 days?: Yes    Hospitalized recently for fever and/or lower respiratory symptoms?: No      Currently a healthcare worker that is involved in direct patient care?: No      Works in a special setting where the risk of COVID-19 transmission may be high? (this may include long-term care, correctional and prison facilities; homeless shelters; assisted-living facilities and group homes ): No      Resident in a special setting where the risk of COVID-19 transmission may be high? (this may include long-term care, correctional and prison facilities; homeless shelters; assisted-living facilities and group homes ): No      Luci Cohen was exposed to her granddaughter last Thursday (8 days ago)  Her granddaughter tested positive a few days ago  She denies any symptoms         No results found for: Say Rocha, RAKAN, [address]  Past Medical History:   Diagnosis Date    Acoustic neuroma ([address] )     left ear, decreased hearing    Arthritis     right knee    Disease of thyroid gland     History of radiation therapy 2007    Left acoustic neuroma    Hyperlipidemia     Urethral hypermobility     Uterine prolapse      Past Surgical History:   Procedure Laterality Date    COLONOSCOPY      [place] CMBND ANTERPOST COLPORRAPHY W/CYSTO N/A 1/8/2019 Procedure: A&P COLPORRHAPHY;  Surgeon: Luciana Mc MD;  [facility];  Service: UroGynecology           SD CYSTOURETHROSCOPY N/A 1/8/2019    Procedure: Tiana Kins;  Surgeon: Luciana Mc MD;  [facility];  Service: UroGynecology           SD REVAGINAL PROLAPSE,SACROSP LIG N/A 1/8/2019    Procedure: ANTERIOR VE COLPOPEXY;  Surgeon: Luciana Mc MD;  [facility];  Service: UroGynecology           SD SLING OPER STRES INCONTINENCE N/A 1/8/2019    Procedure: PUBOVAGINAL SLING;  Surgeon: Luciana Mc MD;  [facility];  Service: UroGynecology           TUBAL LIGATION      WISDOM TOOTH EXTRACTION       Current Outpatient Medications   Medication Sig Dispense Refill    atorvastatin (LIPITOR) 20 mg tablet TAKE 1 TABLET BY MOUTH  DAILY 90 tablet 1    b complex vitamins tablet Take 1 tablet by mouth daily      BIOTIN PO Take 1,000 mg by mouth daily      Cholecalciferol (VITAMIN D) 2000 units CAPS Take 2,000 Units by mouth daily       levothyroxine 88 mcg tablet TAKE 1 TABLET BY MOUTH  DAILY 90 tablet 1    Multiple Vitamin (MULTIVITAMIN) tablet Take 1 tablet by mouth daily       No current facility-administered medications for this visit  Allergies   Allergen Reactions    Erythromycin Rash       Review of Systems   Constitutional: Negative for chills, fatigue and fever  HENT: Negative for congestion, rhinorrhea and sore throat  Respiratory: Negative for cough, chest tightness and shortness of breath  Gastrointestinal: Negative for abdominal pain, diarrhea, nausea and vomiting  Musculoskeletal: Negative for myalgias  Neurological: Negative for headaches  Objective: There were no vitals filed for this visit  Physical Exam  Constitutional:       Appearance: She is well-developed  HENT:      Head: Normocephalic  Eyes:      Pupils: Pupils are equal, round, and reactive to light     Pulmonary:      Effort: Pulmonary effort is normal  Neurological:      Mental Status: She is alert  Psychiatric:         Behavior: Behavior normal        VIRTUAL VISIT DISCLAIMER    Conchita Sandoval acknowledges that she has consented to an online visit or consultation  She understands that the online visit is based solely on information provided by her, and that, in the absence of a face-to-face physical evaluation by the physician, the diagnosis she receives is both limited and provisional in terms of accuracy and completeness  This is not intended to replace a full medical face-to-face evaluation by the physician  Conchita Sandoval understands and accepts these terms

## 2021-03-01 ENCOUNTER — OFFICE VISIT (OUTPATIENT)
Dept: INTERNAL MEDICINE CLINIC | Facility: CLINIC | Age: 71
End: 2021-03-01
Payer: MEDICARE

## 2021-03-01 VITALS
WEIGHT: 148.2 LBS | SYSTOLIC BLOOD PRESSURE: 122 MMHG | HEART RATE: 105 BPM | HEIGHT: 62 IN | OXYGEN SATURATION: 98 % | DIASTOLIC BLOOD PRESSURE: 84 MMHG | TEMPERATURE: 97.9 F | BODY MASS INDEX: 27.27 KG/M2

## 2021-03-01 DIAGNOSIS — D33.3 LEFT ACOUSTIC NEUROMA (HCC): ICD-10-CM

## 2021-03-01 DIAGNOSIS — E03.9 HYPOTHYROIDISM, UNSPECIFIED TYPE: ICD-10-CM

## 2021-03-01 DIAGNOSIS — R73.03 PREDIABETES: ICD-10-CM

## 2021-03-01 DIAGNOSIS — Z00.00 MEDICARE ANNUAL WELLNESS VISIT, SUBSEQUENT: ICD-10-CM

## 2021-03-01 DIAGNOSIS — E78.00 HYPERCHOLESTEROLEMIA: ICD-10-CM

## 2021-03-01 DIAGNOSIS — I10 ESSENTIAL HYPERTENSION: Primary | ICD-10-CM

## 2021-03-01 PROCEDURE — 1123F ACP DISCUSS/DSCN MKR DOCD: CPT | Performed by: NURSE PRACTITIONER

## 2021-03-01 PROCEDURE — G0438 PPPS, INITIAL VISIT: HCPCS | Performed by: NURSE PRACTITIONER

## 2021-03-01 PROCEDURE — 99214 OFFICE O/P EST MOD 30 MIN: CPT | Performed by: NURSE PRACTITIONER

## 2021-03-01 NOTE — PROGRESS NOTES
Assessment/Plan:    Hypothyroidism  On levothyroxine  Essential hypertension  Blood pressure stable, off medication  Left acoustic neuroma ([facility] [address] )  No changes on last MRI, 2019  Due 2024  Sees neurosurgery  Hypercholesterolemia  Lipids at goal   On statin     Prediabetes  A1C 5 7%  Continue to reduce sugars and carbohydrates  BMI Counseling: Body mass index is 27 11 kg/m²  The BMI is above normal  Nutrition recommendations include reducing portion sizes, decreasing overall calorie intake and moderation in carbohydrate intake  Exercise recommendations include exercising 3-5 times per week  Diagnoses and all orders for this visit:    Essential hypertension  -     Comprehensive metabolic panel; Future    Hypercholesterolemia  -     Lipid Panel with Direct LDL reflex; Future    Prediabetes  -     Hemoglobin A1C; Future    Medicare annual wellness visit, subsequent    Hypothyroidism, unspecified type  -     TSH, 3rd generation with Free T4 reflex; Future    Left acoustic neuroma ([facility] [address] )    Other orders  -     Occult Blood, Fecal Immunochemical; Future  -     DXA bone density spine hip and pelvis; Future  -     CBC and differential; Future          Subjective:      Patient ID: Amara Alarcon is a 78 y o  female  Here today for follow up  Agustín Austin is doing well  She was recently exposed to her COVID positive granddaugther  She tested negative and remained quarantined for the appropriate amount of time  Last year she reports stepping wrong down the stairs and twisting her left foot  Xray at that time was normal  She occasionally has left inner foot pain if she walks for extended periods of time  She denies any swelling  She doesn't take any OTC medications for pain  She is scheduled for her mammogram tomorrow  She declines further colonoscopies but is agreeable to FIT             The following portions of the patient's history were reviewed and updated as appropriate: allergies, current medications, past family history, past medical history, past social history, past surgical history and problem list     Review of Systems   Constitutional: Negative for activity change, appetite change, fatigue and unexpected weight change  Eyes: Negative for visual disturbance  Respiratory: Negative for cough, chest tightness and shortness of breath  Cardiovascular: Negative for chest pain, palpitations and leg swelling  Gastrointestinal: Negative for abdominal pain, constipation and diarrhea  Genitourinary: Negative for difficulty urinating  Musculoskeletal: Positive for arthralgias  Skin: Negative for rash  Neurological: Negative for dizziness, weakness, light-headedness and headaches  Psychiatric/Behavioral: Negative for sleep disturbance  Objective:      /84   Pulse 105   Temp 97 9 °F (36 6 °C)   Ht 5' 2" (1 575 m)   Wt 67 2 kg (148 lb 3 2 oz)   SpO2 98%   BMI 27 11 kg/m²          Physical Exam  Vitals signs reviewed  Constitutional:       Appearance: She is well-developed  HENT:      Head: Normocephalic and atraumatic  Right Ear: Tympanic membrane, ear canal and external ear normal       Left Ear: Tympanic membrane, ear canal and external ear normal    Eyes:      Conjunctiva/sclera: Conjunctivae normal       Pupils: Pupils are equal, round, and reactive to light  Cardiovascular:      Rate and Rhythm: Normal rate and regular rhythm  Heart sounds: Normal heart sounds  Pulmonary:      Effort: Pulmonary effort is normal       Breath sounds: Normal breath sounds  Abdominal:      General: Bowel sounds are normal       Palpations: Abdomen is soft  Musculoskeletal: Normal range of motion  Skin:     General: Skin is warm and dry  Neurological:      Mental Status: She is alert and oriented to person, place, and time  Psychiatric:         Behavior: Behavior normal        reviewed labs with patient

## 2021-03-01 NOTE — PROGRESS NOTES
Assessment and Plan:     Problem List Items Addressed This Visit        Endocrine    Hypothyroidism     On levothyroxine  Relevant Orders    TSH, 3rd generation with Free T4 reflex       Cardiovascular and Mediastinum    Essential hypertension - Primary     Blood pressure stable, off medication  Relevant Orders    Comprehensive metabolic panel       Nervous and Auditory    Left acoustic neuroma (HCC)     No changes on last MRI, 2019  Due 2024  Sees neurosurgery  Other    Hypercholesterolemia     Lipids at goal   On statin          Relevant Orders    Lipid Panel with Direct LDL reflex    Prediabetes     A1C 5 7%  Continue to reduce sugars and carbohydrates  Relevant Orders    Hemoglobin A1C      Other Visit Diagnoses     Medicare annual wellness visit, subsequent               Preventive health issues were discussed with patient, and age appropriate screening tests were ordered as noted in patient's After Visit Summary  Personalized health advice and appropriate referrals for health education or preventive services given if needed, as noted in patient's After Visit Summary  History of Present Illness:     Patient presents for Medicare Annual Wellness visit    Patient Care Team:  Duane Feeling as PCP - General (Internal Medicine)     Problem List:     Patient Active Problem List   Diagnosis    Essential hypertension    Hypercholesterolemia    Hypothyroidism    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[address] )    Incomplete uterovaginal prolapse    Other specified disorders of bladder    Primary osteoarthritis involving multiple joints    Ulcerative colitis ([address] )    Vitamin D deficiency    Prediabetes    Left acoustic neuroma ([address] )    Overweight (BMI 25 0-29  9)    Benign paroxysmal positional vertigo      Past Medical and Surgical History:     Past Medical History:   Diagnosis Date    Acoustic neuroma ([address] )     left ear, decreased hearing    Arthritis right knee    Disease of thyroid gland     History of radiation therapy 2007    Left acoustic neuroma    Hyperlipidemia     Urethral hypermobility     Uterine prolapse      Past Surgical History:   Procedure Laterality Date    COLONOSCOPY      UT CMBND ANTERPOST COLPORRAPHY W/CYSTO N/A 1/8/2019    Procedure: A&P COLPORRHAPHY;  Surgeon: Vinny Martínez MD;  [facility];  Service: UroGynecology           UT CYSTOURETHROSCOPY N/A 1/8/2019    Procedure: Rickford Kurtis;  Surgeon: Vinny Martínez MD;  [facility];  Service: UroGynecology           [facility] [address] N/A 1/8/2019    Procedure: ANTERIOR VE COLPOPEXY;  Surgeon: Vinny Martínez MD;  [facility];  Service: UroGynecology           UT SLING OPER STRES INCONTINENCE N/A 1/8/2019    Procedure: PUBOVAGINAL SLING;  Surgeon: Vinny Martínez MD;  [facility];  Service: UroGynecology           TUBAL LIGATION      WISDOM TOOTH EXTRACTION        Family History:     Family History   Problem Relation Age of Onset    Arthritis Family     Diabetes Family     Hypertension Family     Osteoporosis Family     Kidney disease Family     Thyroid disease Family     Hypertension Mother     Diabetes Father     Asthma Father     Lung disease Father     Thyroid disease Sister     Thyroid disease Daughter     Mental illness Neg Hx     Substance Abuse Neg Hx     Alcohol abuse Neg Hx     Depression Neg Hx       Social History:     E-Cigarette/Vaping    E-Cigarette Use Never User      E-Cigarette/Vaping Substances    Nicotine No     THC No     CBD No     Flavoring No     Other No     Unknown No      Social History     Socioeconomic History    Marital status: /Civil Union     Spouse name: Fauzia Caraballo Number of children: 3    Years of education: Assoc degree +    Highest education level: None   Occupational History    Occupation: Retired   Social Needs    Financial resource strain: None   [place] insecurity     Worry: None     Inability: None    Transportation needs     Medical: None     Non-medical: None   Tobacco Use    Smoking status: Never Smoker    Smokeless tobacco: Never Used   Substance and Sexual Activity    Alcohol use: Yes     Comment: rare    Drug use: No    Sexual activity: None   Lifestyle    Physical activity     Days per week: None     Minutes per session: None    Stress: None   Relationships    Social connections     Talks on phone: None     Gets together: None     Attends Sikhism service: None     Active member of club or organization: None     Attends meetings of clubs or organizations: None     Relationship status: None    Intimate partner violence     Fear of current or ex partner: None     Emotionally abused: None     Physically abused: None     Forced sexual activity: None   Other Topics Concern    None   Social History Narrative    Drinks coffee 1-2 cups/day      Medications and Allergies:     Current Outpatient Medications   Medication Sig Dispense Refill    atorvastatin (LIPITOR) 20 mg tablet TAKE 1 TABLET BY MOUTH  DAILY 90 tablet 1    b complex vitamins tablet Take 1 tablet by mouth daily      BIOTIN PO Take 1,000 mg by mouth daily      Cholecalciferol (VITAMIN D) 2000 units CAPS Take 2,000 Units by mouth daily       levothyroxine 88 mcg tablet TAKE 1 TABLET BY MOUTH  DAILY 90 tablet 1    Multiple Vitamin (MULTIVITAMIN) tablet Take 1 tablet by mouth daily       No current facility-administered medications for this visit        Allergies   Allergen Reactions    Erythromycin Rash      Immunizations:     Immunization History   Administered Date(s) Administered    INFLUENZA 12/14/2018, 11/16/2019    Influenza Split High Dose Preservative Free IM 11/16/2019    Influenza, high dose seasonal 0 7 mL 10/10/2020      Health Maintenance:         Topic Date Due    Colorectal Cancer Screening  10/27/2015    MAMMOGRAM  03/02/2021    Hepatitis C Screening  Completed Topic Date Due    Pneumococcal Vaccine: 65+ Years (1 of 1 - PPSV23) 03/24/2017      Medicare Health Risk Assessment:     /84   Pulse 105   Temp 97 9 °F (36 6 °C)   Ht 5' 2" (1 575 m)   Wt 67 2 kg (148 lb 3 2 oz)   SpO2 98%   BMI 27 11 kg/m²      Lynda Olivas is here for her Subsequent Wellness visit  Last Medicare Wellness visit information reviewed, patient interviewed and updates made to the record today  Health Risk Assessment:   Patient rates overall health as good  Patient feels that their physical health rating is same  Eyesight was rated as same  Hearing was rated as same  Patient feels that their emotional and mental health rating is same  Pain experienced in the last 7 days has been none  Patient states that she has experienced no weight loss or gain in last 6 months  Depression Screening:   PHQ-2 Score: 0      Fall Risk Screening: In the past year, patient has experienced: history of falling in past year    Injured during fall?: Yes    Feels unsteady when standing or walking?: No    Worried about falling?: No      Urinary Incontinence Screening:   Patient has leaked urine accidently in the last six months  Home Safety:  Patient does not have trouble with stairs inside or outside of their home  Patient has working smoke alarms and has working carbon monoxide detector  Home safety hazards include: none  Nutrition:   Current diet is Regular  Medications:   Patient is currently taking over-the-counter supplements  OTC medications include: see medication list  Patient is able to manage medications  Activities of Daily Living (ADLs)/Instrumental Activities of Daily Living (IADLs):   Walk and transfer into and out of bed and chair?: Yes  Dress and groom yourself?: Yes    Bathe or shower yourself?: Yes    Feed yourself?  Yes  Do your laundry/housekeeping?: Yes  Manage your money, pay your bills and track your expenses?: Yes  Make your own meals?: Yes    Do your own shopping?: Yes    Previous Hospitalizations:   Any hospitalizations or ED visits within the last 12 months?: No      Advance Care Planning:   Living will: Yes    Durable POA for healthcare:  Yes    Advanced directive: Yes      PREVENTIVE SCREENINGS      Cardiovascular Screening:    General: Screening Not Indicated and History Lipid Disorder      Diabetes Screening:     General: Screening Current      Colorectal Cancer Screening:     General: Risks and Benefits Discussed    Due for: FOBT/FIT      Breast Cancer Screening:     General: Screening Current      Cervical Cancer Screening:    General: Screening Not Indicated      Osteoporosis Screening:    General: Risks and Benefits Discussed    Due for: DXA Axial      Abdominal Aortic Aneurysm (AAA) Screening:        General: Screening Not Indicated      Lung Cancer Screening:     General: Screening Not Indicated      Hepatitis C Screening:    General: Screening Current      ELAYNE Pinzon

## 2021-03-10 DIAGNOSIS — Z23 ENCOUNTER FOR IMMUNIZATION: ICD-10-CM

## 2021-04-07 DIAGNOSIS — E03.9 HYPOTHYROIDISM, UNSPECIFIED TYPE: ICD-10-CM

## 2021-04-07 DIAGNOSIS — E78.00 HYPERCHOLESTEROLEMIA: ICD-10-CM

## 2021-04-07 RX ORDER — LEVOTHYROXINE SODIUM 88 UG/1
88 TABLET ORAL DAILY
Qty: 90 TABLET | Refills: 1 | Status: SHIPPED | OUTPATIENT
Start: 2021-04-07 | End: 2021-09-13

## 2021-04-07 RX ORDER — ATORVASTATIN CALCIUM 20 MG/1
20 TABLET, FILM COATED ORAL DAILY
Qty: 90 TABLET | Refills: 1 | Status: SHIPPED | OUTPATIENT
Start: 2021-04-07 | End: 2021-09-13

## 2021-04-22 ENCOUNTER — TELEPHONE (OUTPATIENT)
Dept: NEUROSURGERY | Facility: CLINIC | Age: 71
End: 2021-04-22

## 2021-04-22 DIAGNOSIS — D33.3 LEFT ACOUSTIC NEUROMA (HCC): Primary | ICD-10-CM

## 2021-04-22 NOTE — TELEPHONE ENCOUNTER
Patient called the office reporting since the weekend she has been experiencing shock like sensations that start near her top portion of her left ear that radiates into her left side of her jaw  These occur when she eats or moves her mouth  The pain is intermittent and can be severe  She reports she saw the dentist the other day and he contributed the pain to her acoustic neuroma  Informed patient this RN will send a message to Dr Eloisa Quintero for his thoughts/recommendations  Patient was very appreciative  To note, patient has a history of a left sided acoutic neuroma  Patient was seen in the office on 3/6/20 and an MRI brain in 5 years was recommended  Thoughts/recommendations? Thank you

## 2021-04-23 NOTE — TELEPHONE ENCOUNTER
Called patient to notify her how Dr Loc Campos is out of the office today and this RN will discuss with Dr Loc Campos when he returns to the office on Monday  Patient was appreciative for the update

## 2021-04-26 NOTE — TELEPHONE ENCOUNTER
Dr Dolores Kanner recommended for patient to obtain MRI brain now to further assess  Called patient and notified her of the above  Patient agreed to obtain MRI brain now  Offered to schedule the MRI brain at [facility], but patient declined  She wishes to obtain MRI brain at [facility]  Patient will call to schedule  After she schedules the MRI, she will call back this RN to let us know when it is scheduled and to also schedule with Dr Dolores Kanner to review  Patient was appreciative

## 2021-04-26 NOTE — TELEPHONE ENCOUNTER
Patient returned the call  [facility] is [place] [facility] [place] at 11:30 am  Scheduled an appointment with Dr Rick Vera on 5/11/21 at 10:30 am  Patient was appreciative

## 2021-04-30 ENCOUNTER — TELEPHONE (OUTPATIENT)
Dept: NEUROSURGERY | Facility: CLINIC | Age: 71
End: 2021-04-30

## 2021-04-30 ENCOUNTER — OFFICE VISIT (OUTPATIENT)
Dept: INTERNAL MEDICINE CLINIC | Facility: CLINIC | Age: 71
End: 2021-04-30
Payer: MEDICARE

## 2021-04-30 VITALS
OXYGEN SATURATION: 96 % | WEIGHT: 148.6 LBS | BODY MASS INDEX: 27.34 KG/M2 | TEMPERATURE: 98.2 F | DIASTOLIC BLOOD PRESSURE: 80 MMHG | SYSTOLIC BLOOD PRESSURE: 140 MMHG | HEIGHT: 62 IN | HEART RATE: 89 BPM

## 2021-04-30 DIAGNOSIS — R52 ELECTRIC SHOCK-TYPE PAIN: ICD-10-CM

## 2021-04-30 DIAGNOSIS — R51.9 LEFT-SIDED FACE PAIN: ICD-10-CM

## 2021-04-30 DIAGNOSIS — D33.3 LEFT ACOUSTIC NEUROMA (HCC): ICD-10-CM

## 2021-04-30 DIAGNOSIS — R68.84 JAW PAIN: Primary | ICD-10-CM

## 2021-04-30 PROCEDURE — 99213 OFFICE O/P EST LOW 20 MIN: CPT | Performed by: NURSE PRACTITIONER

## 2021-04-30 RX ORDER — FEXOFENADINE HCL AND PSEUDOEPHEDRINE HCI 180; 240 MG/1; MG/1
1 TABLET, EXTENDED RELEASE ORAL DAILY
COMMUNITY
End: 2022-03-14

## 2021-04-30 RX ORDER — CARBAMAZEPINE 100 MG/1
100 TABLET, EXTENDED RELEASE ORAL 2 TIMES DAILY
Qty: 60 TABLET | Refills: 0 | Status: SHIPPED | OUTPATIENT
Start: 2021-04-30 | End: 2021-05-21 | Stop reason: SDUPTHER

## 2021-04-30 NOTE — TELEPHONE ENCOUNTER
Called patient and she placed this RN on speaker phone with her   They inquired what they can do about the pain  Instructed for patient to call family doctor to see if they are willing to prescribe medications to help with the pain  Typically, Dr León Alfonso does not manage trigeminal neuralgia  We are following the patient for an acoustic neuroma and she was last seen in the office on 3/6/20  Patient is scheduled for an MRI on 5/6/21 and an appointment with Dr León Alfonso on 5/11/21  Offered to move up MRI appointment and to see if the patient can be seen sooner by Dr León Alfonso  They said they will try to move it up  If they are able to move up the MRI, then they will call us  Patient will call her PCP in the meantime

## 2021-04-30 NOTE — TELEPHONE ENCOUNTER
Called patient and spoke with Ale Elizabeth  She would like to move up her appt with [facility] since imaging appt was moved up to today due to pain  I was able to offer him Monday, 5/3 @ 1045 in the [place] office  Ale Elizabeth accepted the appt and was appreciative for the coordination

## 2021-04-30 NOTE — TELEPHONE ENCOUNTER
----- Message from Karol Baxter sent at 4/30/2021  9:34 AM EDT -----  Regarding: Non-Urgent Medical Question  Contact: [phone]  I have been having more sharp pain in my jaw  I have an MRI scheduled for May 6th - with an office visit with Dr Hellen Pillai on May 11th  It has been mentioned by my dentist that my problem might be Trigeminal Neuralgia  Dr Hellen Pillai is treating me for an acoustic neuroma  Does he also handle Trigeminal Neuralgia or can suggest I see another doctor regarding that? Will the MRI, I am scheduled for, look for that type of problem also? I would appreciate someone calling me regarding this painful situation  [phone]  The sharp stabbing pain happens when I talk or eat and affects my lower jaw  HELP

## 2021-04-30 NOTE — TELEPHONE ENCOUNTER
----- Message from Claudia Baxter sent at 4/30/2021 11:26 AM EDT -----  Regarding: Non-Urgent Medical Question  Contact: [phone]  I was able to move my MRI appointment up from Thursday, May 6th to today, Friday, April 30th at 1:00  Because of this change, I would like to move my appointment with Dr Katerin Bergman up from Tuesday, May 11th at 10:30 up to and appointment ASAP following the April 30th MRI  Please give me a call to aleisha  [phone]

## 2021-04-30 NOTE — PROGRESS NOTES
Assessment/Plan:    MRI pending  See neurosurgery on Monday  No lesions present in or around ear to suggest shingles  Symptoms correlate with trigeminal neuralgia  Start carbamazepine 100 mg twice daily  Reviewed side effects of the medication  If not tolerated well, can try gabapentin  Diagnoses and all orders for this visit:    Jaw pain  -     carBAMazepine (TEGretol XR) 100 mg 12 hr tablet; Take 1 tablet (100 mg total) by mouth 2 (two) times a day    Electric shock-type pain  -     carBAMazepine (TEGretol XR) 100 mg 12 hr tablet; Take 1 tablet (100 mg total) by mouth 2 (two) times a day    Left-sided face pain    Left acoustic neuroma (HCC)    Other orders  -     fexofenadine-pseudoephedrine (ALLEGRA-D 24) 180-240 MG per 24 hr tablet; Take 1 tablet by mouth daily           Subjective:      Patient ID: Freddy Guillen is a 73 y o  female  Here today with complaints of left jaw pain   This started about 8 days ago  She describes it as sharp shock like sensations that start in her left ear and radiate to her jaw  The pain occurs when she eats, talks, or moves her mouth  The pain is intermittent only with movement and is severe  She went to the dentist and he thought the pain was related to her acoustic neuroma  She had an MRI brain today and is scheduled with neurosurgery next week  She went back to her dentist who thought it could be trigeminal neuralgia  She has another dental appointment this weekend  She has hearing loss due to the neuroma, it has not been worse  She denies any headaches or vision changes  The following portions of the patient's history were reviewed and updated as appropriate: allergies, current medications, past family history, past medical history, past social history, past surgical history and problem list     Review of Systems   Constitutional: Negative for activity change, appetite change, fatigue and fever  HENT: Positive for ear pain   Negative for congestion, facial swelling and tinnitus  Eyes: Negative for visual disturbance  Respiratory: Negative for cough and shortness of breath  Cardiovascular: Negative for chest pain  Gastrointestinal: Negative for abdominal pain  Skin: Negative for rash  Neurological: Negative for dizziness, syncope, facial asymmetry, speech difficulty, weakness, light-headedness, numbness and headaches  Sharp pains from left ear to jaw  Objective:      /80   Pulse 89   Temp 98 2 °F (36 8 °C)   Ht 5' 2" (1 575 m)   Wt 67 4 kg (148 lb 9 6 oz)   SpO2 96%   BMI 27 18 kg/m²          Physical Exam  Vitals signs reviewed  Constitutional:       Appearance: Normal appearance  HENT:      Head: Normocephalic and atraumatic  Right Ear: Tympanic membrane, ear canal and external ear normal       Left Ear: Tympanic membrane, ear canal and external ear normal    Eyes:      Conjunctiva/sclera: Conjunctivae normal       Pupils: Pupils are equal, round, and reactive to light  Cardiovascular:      Rate and Rhythm: Normal rate and regular rhythm  Heart sounds: Normal heart sounds  Pulmonary:      Effort: Pulmonary effort is normal       Breath sounds: Normal breath sounds  Musculoskeletal:         General: No swelling  Skin:     General: Skin is warm and dry  Neurological:      Mental Status: She is alert and oriented to person, place, and time     Psychiatric:         Mood and Affect: Mood normal          Behavior: Behavior normal

## 2021-05-03 ENCOUNTER — OFFICE VISIT (OUTPATIENT)
Dept: NEUROSURGERY | Facility: CLINIC | Age: 71
End: 2021-05-03
Payer: MEDICARE

## 2021-05-03 VITALS
HEIGHT: 62 IN | SYSTOLIC BLOOD PRESSURE: 130 MMHG | DIASTOLIC BLOOD PRESSURE: 82 MMHG | HEART RATE: 84 BPM | BODY MASS INDEX: 27.23 KG/M2 | WEIGHT: 148 LBS | TEMPERATURE: 98.2 F | RESPIRATION RATE: 16 BRPM

## 2021-05-03 DIAGNOSIS — D33.3 BENIGN NEOPLASM OF CRANIAL NERVE (HCC): ICD-10-CM

## 2021-05-03 DIAGNOSIS — G50.0 TRIGEMINAL NEURALGIA OF LEFT SIDE OF FACE: Primary | ICD-10-CM

## 2021-05-03 PROCEDURE — 99214 OFFICE O/P EST MOD 30 MIN: CPT | Performed by: NEUROLOGICAL SURGERY

## 2021-05-03 NOTE — PROGRESS NOTES
Neurosurgery Office Note  Rena Travis 73 y o  female MRN: [id number]      Assessment/Plan      Diagnoses and all orders for this visit:    Trigeminal neuralgia of left side of face    Benign neoplasm of cranial nerve [facility])          Discussion:    [de-identified] year old woman seen in follow-up today  Known history of left acoustic neuroma treated with IMRT 2007  Initially followed by Dr Roger Fletcher, and then transition to follow-up with us as she lives closer  I saw her 03/2020, and agreed a follow-up study in 5 years from her prior, 2024  However, recently, she has begun to have pain in her left lower teeth, jaw  She describes these as shock-like  There incited by talking, eating, brushing her teeth  She saw her dentist twice who states there are no issues with teeth in this region  She denies pain in her left cheek or left forehead  Denies numbness  Was prescribed carbamazepine by her primary physician, started that a few days ago, 100 mg b i d  She states this may have improved things  On exam she has facial symmetry, no sign of weakness  Furthermore, she mentions occasional pain in her ear, down her neck, perhaps suggestive of glossopharyngeal neuropathy/neuritis  MRI scan of the brain from 2021 reviewed as best as possible  Study only partially uploaded, and unable to review fully  Report states it is stable to the study from 2019  This did not contact her trigeminal nerve  There may be a small vessel abutting the nerve, but not a profound vessel  I reviewed with the patient the natural history of trigeminal neuralgia  Her etiology could be post RT, versus vascular related, versus intrinsic/atypical   We discussed briefly options for management ranging from medications, to glycerol rhizotomy/radiofrequency ablation, to radio surgery, to surgery  In this patient's age, I certainly would not recommend surgery for this    I would strongly recommend aggressive medical therapy, Routing refill request to provider for review/approval because:  Drug not on the [facility] refill protocol     Jewell Weldon RN           perhaps with the assistance of Neurology if needed, before any interventions  More appropriate intervention, should she fail medical therapy, would be glycerol rhizotomy  As such the patient will continue to manage this medically  Should she wished referral to Neurology, I am happy to provide this  She did leave her disc of her imaging today so that we could re-loaded, and I can reviewed in full  We will return her to her by mail  Otherwise will keep her follow-up as scheduled, with a new MRI scan at that time  Metrics:  EQ5D5L 1 000, VA S 85, KPS 90, ECOG 0     05/03/21 Metrics: EQ5D5L 45646=5 682; VAS 90; ECOG 0; KPS 90      I have spent 25 minutes with Patient and family today in which greater than 50% of this time was spent in counseling/coordination of care regarding Diagnostic results, Prognosis, Risks and benefits of tx options, Intructions for management, Patient and family education and Impressions  CHIEF COMPLAINT    Chief Complaint   Patient presents with    Follow-up     follow up after MRI; patient will bring disc to the [facility]       HISTORY    History of Present Illness     73y o  year old female     HPI    See Discussion    REVIEW OF SYSTEMS    Review of Systems   Constitutional: Positive for fatigue (due to medication)  HENT: Positive for ear pain (Occasional ice pick pain in area of left ear), hearing loss (left side) and tinnitus (left, constant)  Sharp pain in jaw, sharp shooting pain   Eyes: Negative  Respiratory: Negative  Cardiovascular: Negative  Gastrointestinal: Negative  Right side sharp pain, under breast and above stomach   Endocrine: Negative  Genitourinary: Negative  Musculoskeletal: Positive for gait problem (mild, no falls)  Skin: Negative  Allergic/Immunologic: Positive for environmental allergies  Neurological: Positive for numbness (left side of face/jaw and bilateral feet)   Negative for dizziness (vertigo, mild), seizures, syncope, speech difficulty, weakness and headaches  Occasional ice pick pain in area of left ear   Hematological: Negative  Psychiatric/Behavioral: Positive for decreased concentration (maybe due to medication)  Negative for confusion and sleep disturbance (sleeps more during the day maybe due to medication)  Occasional trouble word finding, memory difficulty with aging         Meds/Allergies     Current Outpatient Medications   Medication Sig Dispense Refill    atorvastatin (LIPITOR) 20 mg tablet Take 1 tablet (20 mg total) by mouth daily 90 tablet 1    BIOTIN PO Take 1,000 mg by mouth daily      carBAMazepine (TEGretol XR) 100 mg 12 hr tablet Take 1 tablet (100 mg total) by mouth 2 (two) times a day 60 tablet 0    Cholecalciferol (VITAMIN D) 2000 units CAPS Take 2,000 Units by mouth daily       fexofenadine-pseudoephedrine (ALLEGRA-D 24) 180-240 MG per 24 hr tablet Take 1 tablet by mouth daily      levothyroxine 88 mcg tablet Take 1 tablet (88 mcg total) by mouth daily 90 tablet 1    Multiple Vitamin (MULTIVITAMIN) tablet Take 1 tablet by mouth daily      b complex vitamins tablet Take 1 tablet by mouth daily       No current facility-administered medications for this visit          Allergies   Allergen Reactions    Erythromycin Rash       PAST HISTORY    Past Medical History:   Diagnosis Date    Acoustic neuroma ([address] )     left ear, decreased hearing    Arthritis     right knee    Disease of thyroid gland     History of radiation therapy 2007    Left acoustic neuroma    Hyperlipidemia     Trigeminal neuralgia of left side of face     Urethral hypermobility     Uterine prolapse        Past Surgical History:   Procedure Laterality Date    COLONOSCOPY      MT CMBND ANTERPOST COLPORRAPHY W/CYSTO N/A 1/8/2019    Procedure: A&P COLPORRHAPHY;  Surgeon: Maria Fernanda Leslie MD;  [facility];  Service: UroGynecology           MT CYSTOURETHROSCOPY N/A 1/8/2019    Procedure: CYSTOSCOPY;  Surgeon: Gopi Galvan MD;  [facility];  Service: UroGynecology           [place] REVAGINAL PROLAPSE,SACROSP LIG N/A 1/8/2019    Procedure: ANTERIOR VE COLPOPEXY;  Surgeon: Gopi Galvan MD;  [facility];  Service: UroGynecology           [place] SLING OPER STRES INCONTINENCE N/A 1/8/2019    Procedure: PUBOVAGINAL SLING;  Surgeon: Gopi Galvan MD;  [facility];  Service: UroGynecology           TUBAL LIGATION      WISDOM TOOTH EXTRACTION         Social History     Tobacco Use    Smoking status: Never Smoker    Smokeless tobacco: Never Used   Substance Use Topics    Alcohol use: Yes     Comment: rare    Drug use: No       Family History   Problem Relation Age of Onset    Arthritis Family     Diabetes Family     Hypertension Family     Osteoporosis Family     Kidney disease Family     Thyroid disease Family     Hypertension Mother     Diabetes Father     Asthma Father     Lung disease Father     Thyroid disease Sister     Thyroid disease Daughter     Mental illness Neg Hx     Substance Abuse Neg Hx     Alcohol abuse Neg Hx     Depression Neg Hx          The following portions of the patient's history were reviewed in this encounter and updated as appropriate: Past medical, surgical, family, and social history, as well as medications, allergies, and review of systems  EXAM    Vitals:Blood pressure 130/82, pulse 84, temperature 98 2 °F (36 8 °C), resp  rate 16, height 5' 2" (1 575 m), weight 67 1 kg (148 lb)  ,Body mass index is 27 07 kg/m²  Physical Exam    Neurologic Exam      MEDICAL DECISION MAKING    Imaging Studies:     Mri Brain W Wo Contrast    Result Date: 5/3/2021  Narrative: 1 3 6 1 4 1 2820 1 2 [id number]      I have personally reviewed pertinent reports     and I have personally reviewed pertinent films in PACS

## 2021-05-06 ENCOUNTER — TELEPHONE (OUTPATIENT)
Dept: INTERNAL MEDICINE CLINIC | Facility: CLINIC | Age: 71
End: 2021-05-06

## 2021-05-06 NOTE — TELEPHONE ENCOUNTER
Patient states she is feeling better and pain is getting better    Patient got results of MRI Dr Stefano Mike said everything seemed fine

## 2021-05-21 ENCOUNTER — OFFICE VISIT (OUTPATIENT)
Dept: INTERNAL MEDICINE CLINIC | Facility: CLINIC | Age: 71
End: 2021-05-21
Payer: MEDICARE

## 2021-05-21 VITALS
OXYGEN SATURATION: 97 % | HEIGHT: 62 IN | SYSTOLIC BLOOD PRESSURE: 124 MMHG | TEMPERATURE: 97.6 F | HEART RATE: 100 BPM | BODY MASS INDEX: 27.46 KG/M2 | DIASTOLIC BLOOD PRESSURE: 80 MMHG | WEIGHT: 149.2 LBS

## 2021-05-21 DIAGNOSIS — G50.0 TRIGEMINAL NEURALGIA OF LEFT SIDE OF FACE: Primary | ICD-10-CM

## 2021-05-21 DIAGNOSIS — M54.6 ACUTE LEFT-SIDED THORACIC BACK PAIN: ICD-10-CM

## 2021-05-21 DIAGNOSIS — D33.3 LEFT ACOUSTIC NEUROMA (HCC): ICD-10-CM

## 2021-05-21 DIAGNOSIS — R52 ELECTRIC SHOCK-TYPE PAIN: ICD-10-CM

## 2021-05-21 DIAGNOSIS — R68.84 JAW PAIN: ICD-10-CM

## 2021-05-21 PROCEDURE — 99214 OFFICE O/P EST MOD 30 MIN: CPT | Performed by: NURSE PRACTITIONER

## 2021-05-21 RX ORDER — CARBAMAZEPINE 100 MG/1
100 TABLET, EXTENDED RELEASE ORAL 2 TIMES DAILY
Qty: 60 TABLET | Refills: 1 | Status: SHIPPED | OUTPATIENT
Start: 2021-05-21 | End: 2021-07-22

## 2021-05-21 NOTE — PROGRESS NOTES
Assessment/Plan:    Left acoustic neuroma (HCC)  Recent MRI stable  Sees neurology  Trigeminal neuralgia of left side of face  Improved symptoms  Continue carbamazepine  Will attempt to wean in 6 weeks if pain free  Diagnoses and all orders for this visit:    Trigeminal neuralgia of left side of face    Left acoustic neuroma ([address] )    Acute left-sided thoracic back pain  Comments:  suspect muscular- can take tylenol as needed  warm compresses to area  call if not improving     Jaw pain  -     carBAMazepine (TEGretol XR) 100 mg 12 hr tablet; Take 1 tablet (100 mg total) by mouth 2 (two) times a day    Electric shock-type pain  -     carBAMazepine (TEGretol XR) 100 mg 12 hr tablet; Take 1 tablet (100 mg total) by mouth 2 (two) times a day          Subjective:      Patient ID: Nakia Miller is a 73 y o  female  Here today for follow up of left jaw pain  She was seen on 4/30 with complaints of electric shock pain in her left jaw  She was started on tegretol for possible trigeminal neuralgia  She had an MRI which showed a stable left acoustic neuroma  She saw neurosurgery who felt since there was no change, this was most likely not causing her symptoms  She has been virtually pain free after being on the medication for a few days  She gets an occasional dull ache but no sharp pains  She is able to talk and chew food without pain  She denies any headaches or vision changes  She has loss of hearing in the left ear from the neuroma  She complaints today of upper left sided back pain that started 4 days ago  She admits to doing some leaf blowing and gardening prior  The pain occurs when she sleeps on that side at night  No pain during the day  She denies chest pain or shortness of breath  She has not taken anything for it and has been able to reposition herself to relieve the pain            The following portions of the patient's history were reviewed and updated as appropriate: allergies, current medications, past family history, past medical history, past social history, past surgical history and problem list     Review of Systems   Constitutional: Negative for activity change, appetite change, fatigue and fever  HENT: Positive for hearing loss  Negative for ear pain and facial swelling  Eyes: Negative for visual disturbance  Respiratory: Negative for cough and shortness of breath  Musculoskeletal: Positive for back pain  Skin: Negative for rash  Neurological: Negative for dizziness, light-headedness and headaches  Objective:      /80   Pulse 100   Temp 97 6 °F (36 4 °C)   Ht 5' 2" (1 575 m)   Wt 67 7 kg (149 lb 3 2 oz)   SpO2 97%   BMI 27 29 kg/m²          Physical Exam  Vitals signs reviewed  Constitutional:       Appearance: Normal appearance  HENT:      Head: Normocephalic and atraumatic  Eyes:      Conjunctiva/sclera: Conjunctivae normal    Cardiovascular:      Rate and Rhythm: Normal rate and regular rhythm  Heart sounds: Normal heart sounds  Pulmonary:      Effort: Pulmonary effort is normal    Musculoskeletal:      Thoracic back: She exhibits pain  She exhibits normal range of motion and no tenderness  Skin:     General: Skin is warm and dry  Neurological:      Mental Status: She is alert and oriented to person, place, and time     Psychiatric:         Mood and Affect: Mood normal          Behavior: Behavior normal

## 2021-06-29 ENCOUNTER — OFFICE VISIT (OUTPATIENT)
Dept: INTERNAL MEDICINE CLINIC | Facility: CLINIC | Age: 71
End: 2021-06-29
Payer: MEDICARE

## 2021-06-29 VITALS
WEIGHT: 148.6 LBS | OXYGEN SATURATION: 97 % | HEART RATE: 100 BPM | SYSTOLIC BLOOD PRESSURE: 128 MMHG | HEIGHT: 62 IN | TEMPERATURE: 97.7 F | DIASTOLIC BLOOD PRESSURE: 70 MMHG | BODY MASS INDEX: 27.34 KG/M2

## 2021-06-29 DIAGNOSIS — R19.7 DIARRHEA, UNSPECIFIED TYPE: ICD-10-CM

## 2021-06-29 DIAGNOSIS — R21 RASH: ICD-10-CM

## 2021-06-29 DIAGNOSIS — G50.0 TRIGEMINAL NEURALGIA OF LEFT SIDE OF FACE: Primary | ICD-10-CM

## 2021-06-29 PROCEDURE — 99214 OFFICE O/P EST MOD 30 MIN: CPT | Performed by: NURSE PRACTITIONER

## 2021-06-29 RX ORDER — AMMONIUM LACTATE 12 G/100G
LOTION TOPICAL
COMMUNITY
Start: 2021-06-15

## 2021-06-29 RX ORDER — TRIAMCINOLONE ACETONIDE 1 MG/G
CREAM TOPICAL
COMMUNITY
Start: 2021-06-15

## 2021-06-29 NOTE — ASSESSMENT & PLAN NOTE
Unsure if rash or diarrhea is related to medication  Will trial weaning off medication  Advised to decrease tegretol to 100 mg daily for 1 month then stop  Call if jaw pain returns

## 2021-06-29 NOTE — PROGRESS NOTES
Assessment/Plan:    Trigeminal neuralgia of left side of face  Unsure if rash or diarrhea is related to medication  Will trial weaning off medication  Advised to decrease tegretol to 100 mg daily for 1 month then stop  Call if jaw pain returns  Diagnoses and all orders for this visit:    Trigeminal neuralgia of left side of face    Rash  Comments:  continue lotion and steroid cream as prescribed by dermatology  Could be possible drug reaction, start weaning off tegretol  call if rash not improving     Diarrhea, unspecified type  Comments:  improving  follow a brat diet  may be from tegretol, start weaning process  Call if diarrhea persists, recommend stool studies/labs    Other orders  -     ammonium lactate (LAC-HYDRIN) 12 % lotion  -     triamcinolone (KENALOG) 0 1 % cream          Subjective:       Patient ID: Octavio Tristan is a 73 y o  female  Here today for follow up  Sawyer was started on carbamezapine about 2 months ago for trigeminal neuralgia   Over the last 3 weeks, she started with a rash on her arms and legs  The rash is itchy  Not painful, no blistering  She did see dermatology who prescribed a lotion as well as a steroid cream  She has not yet started these as she wanted to make sure they didn't interact with her medications  She did try allegra and zyrtec with minimal relief  She also started about a month ago with diarrhea  It was initially several times a day  She has started a probiotic and imodium  The diarrhea has improved  It has been more formed recently  She has some mild abdominal cramping prior to going with relief after  She denies nausea or vomiting                 The following portions of the patient's history were reviewed and updated as appropriate: allergies, current medications, past family history, past medical history, past social history, past surgical history and problem list     Review of Systems   Constitutional: Negative for activity change, appetite change, fatigue and fever  Respiratory: Negative for cough and shortness of breath  Cardiovascular: Negative for chest pain  Gastrointestinal: Positive for diarrhea  Negative for abdominal pain, constipation, nausea and vomiting  Genitourinary: Negative for difficulty urinating and dysuria  Skin: Positive for rash  Neurological: Negative for dizziness, light-headedness and headaches  Objective:      /70   Pulse 100   Temp 97 7 °F (36 5 °C)   Ht 5' 2" (1 575 m)   Wt 67 4 kg (148 lb 9 6 oz)   SpO2 97%   BMI 27 18 kg/m²          Physical Exam  Vitals reviewed  Constitutional:       Appearance: Normal appearance  HENT:      Head: Normocephalic and atraumatic  Eyes:      Conjunctiva/sclera: Conjunctivae normal    Cardiovascular:      Rate and Rhythm: Normal rate and regular rhythm  Heart sounds: Normal heart sounds  Pulmonary:      Effort: Pulmonary effort is normal       Breath sounds: Normal breath sounds  Abdominal:      General: Bowel sounds are normal       Palpations: Abdomen is soft  Tenderness: There is no abdominal tenderness  Musculoskeletal:         General: No swelling  Normal range of motion  Cervical back: Neck supple  Skin:     General: Skin is warm and dry  Findings: Rash present  Comments: Erythematous papules noted on bilateral arms and legs  Small area on right face  Neurological:      Mental Status: She is alert and oriented to person, place, and time     Psychiatric:         Mood and Affect: Mood normal          Behavior: Behavior normal

## 2021-07-26 ENCOUNTER — OFFICE VISIT (OUTPATIENT)
Dept: INTERNAL MEDICINE CLINIC | Facility: CLINIC | Age: 71
End: 2021-07-26
Payer: MEDICARE

## 2021-07-26 VITALS
TEMPERATURE: 97 F | HEART RATE: 81 BPM | DIASTOLIC BLOOD PRESSURE: 70 MMHG | HEIGHT: 62 IN | BODY MASS INDEX: 26.72 KG/M2 | WEIGHT: 145.2 LBS | OXYGEN SATURATION: 97 % | SYSTOLIC BLOOD PRESSURE: 116 MMHG

## 2021-07-26 DIAGNOSIS — R21 RASH: ICD-10-CM

## 2021-07-26 DIAGNOSIS — G50.0 TRIGEMINAL NEURALGIA OF LEFT SIDE OF FACE: ICD-10-CM

## 2021-07-26 DIAGNOSIS — R19.7 DIARRHEA, UNSPECIFIED TYPE: Primary | ICD-10-CM

## 2021-07-26 PROCEDURE — 99214 OFFICE O/P EST MOD 30 MIN: CPT | Performed by: NURSE PRACTITIONER

## 2021-07-26 NOTE — PROGRESS NOTES
Assessment/Plan:    Trigeminal neuralgia of left side of face  Off carbamazepine  No symptoms  Diarrhea  Start fiber supplement daily  Stay well hydrated  Check labs/stool studies  Diagnoses and all orders for this visit:    Diarrhea, unspecified type  -     Stool culture; Future  -     Clostridium difficile toxin by PCR; Future  -     Ova and parasite examination; Future    Trigeminal neuralgia of left side of face    Rash  Comments:  improving  continue lotion and steroid cream per dermatology  Subjective:      Patient ID: Sandro Arambula is a 73 y o  female  Here today for 1 month follow up  Emir Whitfield recently weaned off carbamezapine for trigimenal neuralgia  Completely off the medication for 4 days  She denies any headaches or pains  She was weaned off due to rash and diarrhea  Her rash is somewhat improving  She has been using the cream prescribed by dermatology  It is not itchy  She continues to have diarrhea and rectal urgency  She denies abdominal pain   She has bout 2-4 episodes daily  She has no nausea or vomiting  No unintended weight loss  She is eating and drinking normally  She has tried a probiotic and imodium  She refuses further colonoscopy, her last one was >10 years ago  The following portions of the patient's history were reviewed and updated as appropriate: allergies, current medications, past family history, past medical history, past social history, past surgical history and problem list     Review of Systems   Constitutional: Negative for activity change, appetite change, chills, fatigue, fever and unexpected weight change  Eyes: Negative for visual disturbance  Respiratory: Negative for cough and shortness of breath  Cardiovascular: Negative for chest pain  Gastrointestinal: Positive for diarrhea  Negative for abdominal pain, blood in stool, constipation, nausea and vomiting  Genitourinary: Negative for difficulty urinating  Skin: Positive for rash  Neurological: Negative for dizziness, weakness, light-headedness and headaches  Objective:      /70   Pulse 81   Temp (!) 97 °F (36 1 °C)   Ht 5' 2" (1 575 m)   Wt 65 9 kg (145 lb 3 2 oz)   SpO2 97%   BMI 26 56 kg/m²          Physical Exam  Vitals reviewed  Constitutional:       Appearance: Normal appearance  HENT:      Head: Normocephalic and atraumatic  Eyes:      Pupils: Pupils are equal, round, and reactive to light  Cardiovascular:      Rate and Rhythm: Normal rate and regular rhythm  Heart sounds: Normal heart sounds  Pulmonary:      Effort: Pulmonary effort is normal       Breath sounds: Normal breath sounds  Abdominal:      General: Bowel sounds are normal  There is no distension  Palpations: Abdomen is soft  Tenderness: There is no abdominal tenderness  Skin:     General: Skin is warm and dry  Findings: Rash present  Comments: Healing papules on both arms and chest     Neurological:      Mental Status: She is alert and oriented to person, place, and time     Psychiatric:         Mood and Affect: Mood normal          Behavior: Behavior normal

## 2021-09-10 LAB
ALBUMIN SERPL-MCNC: 4.2 G/DL (ref 3.6–5.1)
ALBUMIN/GLOB SERPL: 1.5 (CALC) (ref 1–2.5)
ALP SERPL-CCNC: 70 U/L (ref 37–153)
ALT SERPL-CCNC: 18 U/L (ref 6–29)
AST SERPL-CCNC: 19 U/L (ref 10–35)
BASOPHILS # BLD AUTO: 74 CELLS/UL (ref 0–200)
BASOPHILS NFR BLD AUTO: 1 %
BILIRUB SERPL-MCNC: 0.8 MG/DL (ref 0.2–1.2)
BUN SERPL-MCNC: 13 MG/DL (ref 7–25)
BUN/CREAT SERPL: NORMAL (CALC) (ref 6–22)
CALCIUM SERPL-MCNC: 9.7 MG/DL (ref 8.6–10.4)
CHLORIDE SERPL-SCNC: 102 MMOL/L (ref 98–110)
CHOLEST SERPL-MCNC: 156 MG/DL
CHOLEST/HDLC SERPL: 4 (CALC)
CO2 SERPL-SCNC: 30 MMOL/L (ref 20–32)
CREAT SERPL-MCNC: 0.77 MG/DL (ref 0.5–0.99)
EOSINOPHIL # BLD AUTO: 37 CELLS/UL (ref 15–500)
EOSINOPHIL NFR BLD AUTO: 0.5 %
ERYTHROCYTE [DISTWIDTH] IN BLOOD BY AUTOMATED COUNT: 12.5 % (ref 11–15)
GLOBULIN SER CALC-MCNC: 2.8 G/DL (CALC) (ref 1.9–3.7)
GLUCOSE SERPL-MCNC: 99 MG/DL (ref 65–99)
HBA1C MFR BLD: 5.5 % OF TOTAL HGB
HCT VFR BLD AUTO: 39.6 % (ref 35–45)
HDLC SERPL-MCNC: 39 MG/DL
HGB BLD-MCNC: 13.3 G/DL (ref 11.7–15.5)
LDLC SERPL CALC-MCNC: 90 MG/DL (CALC)
LYMPHOCYTES # BLD AUTO: 2449 CELLS/UL (ref 850–3900)
LYMPHOCYTES NFR BLD AUTO: 33.1 %
MCH RBC QN AUTO: 30.9 PG (ref 27–33)
MCHC RBC AUTO-ENTMCNC: 33.6 G/DL (ref 32–36)
MCV RBC AUTO: 92.1 FL (ref 80–100)
MONOCYTES # BLD AUTO: 725 CELLS/UL (ref 200–950)
MONOCYTES NFR BLD AUTO: 9.8 %
NEUTROPHILS # BLD AUTO: 4114 CELLS/UL (ref 1500–7800)
NEUTROPHILS NFR BLD AUTO: 55.6 %
NONHDLC SERPL-MCNC: 117 MG/DL (CALC)
PLATELET # BLD AUTO: 273 THOUSAND/UL (ref 140–400)
PMV BLD REES-ECKER: 10.7 FL (ref 7.5–12.5)
POTASSIUM SERPL-SCNC: 4.3 MMOL/L (ref 3.5–5.3)
PROT SERPL-MCNC: 7 G/DL (ref 6.1–8.1)
RBC # BLD AUTO: 4.3 MILLION/UL (ref 3.8–5.1)
SL AMB EGFR AFRICAN AMERICAN: 91 ML/MIN/1.73M2
SL AMB EGFR NON AFRICAN AMERICAN: 79 ML/MIN/1.73M2
SODIUM SERPL-SCNC: 141 MMOL/L (ref 135–146)
TRIGL SERPL-MCNC: 168 MG/DL
TSH SERPL-ACNC: 1.04 MIU/L (ref 0.4–4.5)
WBC # BLD AUTO: 7.4 THOUSAND/UL (ref 3.8–10.8)

## 2021-09-12 DIAGNOSIS — E03.9 HYPOTHYROIDISM, UNSPECIFIED TYPE: ICD-10-CM

## 2021-09-12 DIAGNOSIS — E78.00 HYPERCHOLESTEROLEMIA: ICD-10-CM

## 2021-09-13 ENCOUNTER — OFFICE VISIT (OUTPATIENT)
Dept: INTERNAL MEDICINE CLINIC | Facility: CLINIC | Age: 71
End: 2021-09-13
Payer: MEDICARE

## 2021-09-13 VITALS
WEIGHT: 144 LBS | SYSTOLIC BLOOD PRESSURE: 124 MMHG | HEART RATE: 104 BPM | TEMPERATURE: 97.4 F | OXYGEN SATURATION: 97 % | DIASTOLIC BLOOD PRESSURE: 80 MMHG | HEIGHT: 62 IN | BODY MASS INDEX: 26.5 KG/M2

## 2021-09-13 DIAGNOSIS — E03.9 HYPOTHYROIDISM, UNSPECIFIED TYPE: ICD-10-CM

## 2021-09-13 DIAGNOSIS — R73.03 PREDIABETES: ICD-10-CM

## 2021-09-13 DIAGNOSIS — G50.0 TRIGEMINAL NEURALGIA OF LEFT SIDE OF FACE: Primary | ICD-10-CM

## 2021-09-13 DIAGNOSIS — R10.11 CHRONIC RUQ PAIN: ICD-10-CM

## 2021-09-13 DIAGNOSIS — E78.00 HYPERCHOLESTEROLEMIA: ICD-10-CM

## 2021-09-13 DIAGNOSIS — E55.9 VITAMIN D DEFICIENCY: ICD-10-CM

## 2021-09-13 DIAGNOSIS — G89.29 CHRONIC RUQ PAIN: ICD-10-CM

## 2021-09-13 DIAGNOSIS — I10 ESSENTIAL HYPERTENSION: ICD-10-CM

## 2021-09-13 DIAGNOSIS — D33.3 LEFT ACOUSTIC NEUROMA (HCC): ICD-10-CM

## 2021-09-13 PROCEDURE — 99214 OFFICE O/P EST MOD 30 MIN: CPT | Performed by: NURSE PRACTITIONER

## 2021-09-13 RX ORDER — LEVOTHYROXINE SODIUM 88 UG/1
TABLET ORAL
Qty: 90 TABLET | Refills: 1 | Status: SHIPPED | OUTPATIENT
Start: 2021-09-13 | End: 2022-03-07

## 2021-09-13 RX ORDER — OXCARBAZEPINE 150 MG/1
150 TABLET, FILM COATED ORAL 2 TIMES DAILY
Qty: 60 TABLET | Refills: 0 | Status: SHIPPED | OUTPATIENT
Start: 2021-09-13 | End: 2021-09-23 | Stop reason: SDUPTHER

## 2021-09-13 RX ORDER — ATORVASTATIN CALCIUM 20 MG/1
TABLET, FILM COATED ORAL
Qty: 90 TABLET | Refills: 1 | Status: SHIPPED | OUTPATIENT
Start: 2021-09-13 | End: 2022-03-07

## 2021-09-13 NOTE — PROGRESS NOTES
Assessment/Plan:    Hypothyroidism  Adequately replaced    Essential hypertension  Off medication  Blood pressure well controlled    Left acoustic neuroma (HCC)  MRI stable  Sees neurology     Trigeminal neuralgia of left side of face  Off carbamazepine, ?rash and diarrhea  Now with symptoms reoccurring, will try trileptal twice daily, monitor for side effects  Hypercholesterolemia  On statin  Prediabetes  Normal A1C    Vitamin D deficiency  Takes vit d 3 daily     Chronic RUQ pain  No worsening symptoms  Normal ultrasound in the past, consider rechecking ultrasound if symptoms change or worsen  Discussed possible HIDA scan, suspect biliary colic  Follow a low fat diet  Diagnoses and all orders for this visit:    Trigeminal neuralgia of left side of face  -     OXcarbazepine (TRILEPTAL) 150 mg tablet; Take 1 tablet (150 mg total) by mouth 2 (two) times a day  -     CBC and differential; Future    Chronic RUQ pain    Left acoustic neuroma (HCC)    Hypothyroidism, unspecified type    Essential hypertension  -     Comprehensive metabolic panel; Future    Hypercholesterolemia  -     Lipid Panel with Direct LDL reflex; Future    Prediabetes  -     Hemoglobin A1C; Future    Vitamin D deficiency          Subjective:      Patient ID: Joel Velasquez is a 73 y o  female  Here today for follow up  Herma Gosselin started with left sided facial pain  She was previously treated for trigeminal neuralgia and stopped the medication about a month ago due to rash and diarrhea  They have both resolved but she has started to get electric shock like sensations in the left side of her head like she previously had  They are not as often, they last a second or two  She denies any headaches, ear pain, or vision changes  She reports some intermittent RUQ pain  She has had this for >20 years  It occurs mostly after dinner  She describes it as an ache   She has had a workup in the past and everything was normal  It has not worsened or increased in frequency or intensity  She denies nausea or vomiting  She has not linked it to occurring after certain foods  The following portions of the patient's history were reviewed and updated as appropriate: allergies, current medications, past family history, past medical history, past social history, past surgical history and problem list     Review of Systems   Constitutional: Negative for activity change, appetite change, fatigue and unexpected weight change  HENT: Negative for dental problem, ear pain and facial swelling  Eyes: Negative for visual disturbance  Respiratory: Negative for cough, chest tightness and shortness of breath  Cardiovascular: Negative for chest pain, palpitations and leg swelling  Gastrointestinal: Positive for abdominal pain  Negative for blood in stool, constipation, diarrhea, nausea and vomiting  Genitourinary: Negative for difficulty urinating and dysuria  Musculoskeletal: Negative for arthralgias  Skin: Negative for rash  Neurological: Negative for dizziness, weakness, light-headedness and headaches  Left sided facial pain    Psychiatric/Behavioral: Negative for sleep disturbance  Objective:      /80   Pulse 104   Temp (!) 97 4 °F (36 3 °C)   Ht 5' 2" (1 575 m)   Wt 65 3 kg (144 lb)   SpO2 97%   BMI 26 34 kg/m²          Physical Exam  Vitals reviewed  Constitutional:       Appearance: Normal appearance  She is well-developed  HENT:      Head: Normocephalic and atraumatic  Right Ear: Tympanic membrane, ear canal and external ear normal       Left Ear: Tympanic membrane, ear canal and external ear normal    Eyes:      Conjunctiva/sclera: Conjunctivae normal       Pupils: Pupils are equal, round, and reactive to light  Cardiovascular:      Rate and Rhythm: Normal rate and regular rhythm  Heart sounds: Normal heart sounds     Pulmonary:      Effort: Pulmonary effort is normal       Breath sounds: Normal breath sounds  Abdominal:      General: Bowel sounds are normal       Palpations: Abdomen is soft  Tenderness: There is no abdominal tenderness  Musculoskeletal:         General: Normal range of motion  Cervical back: Neck supple  Skin:     General: Skin is warm and dry  Neurological:      Mental Status: She is alert and oriented to person, place, and time  Psychiatric:         Behavior: Behavior normal        reviewed recent labs with patient

## 2021-09-13 NOTE — ASSESSMENT & PLAN NOTE
No worsening symptoms  Normal ultrasound in the past, consider rechecking ultrasound if symptoms change or worsen  Discussed possible HIDA scan, suspect biliary colic  Follow a low fat diet

## 2021-09-13 NOTE — ASSESSMENT & PLAN NOTE
Off carbamazepine, ?rash and diarrhea  Now with symptoms reoccurring, will try trileptal twice daily, monitor for side effects

## 2021-09-23 ENCOUNTER — TELEPHONE (OUTPATIENT)
Dept: INTERNAL MEDICINE CLINIC | Facility: CLINIC | Age: 71
End: 2021-09-23

## 2021-09-23 DIAGNOSIS — G50.0 TRIGEMINAL NEURALGIA OF LEFT SIDE OF FACE: ICD-10-CM

## 2021-09-23 RX ORDER — OXCARBAZEPINE 300 MG/1
300 TABLET, FILM COATED ORAL 2 TIMES DAILY
Qty: 60 TABLET | Refills: 0 | Status: SHIPPED | OUTPATIENT
Start: 2021-09-23 | End: 2021-09-27 | Stop reason: SDUPTHER

## 2021-09-23 NOTE — TELEPHONE ENCOUNTER
Medication is for pain only  The trigeminal neuralgia often goes away over time but reoccurrences are common

## 2021-09-23 NOTE — TELEPHONE ENCOUNTER
Pt aware, wants to know if this medication is just to relieve the pain or will it eventually help her problem long term  Will start 300mg tomorrow since she took her morning dose already

## 2021-09-23 NOTE — TELEPHONE ENCOUNTER
Please let her know that I started her on a low dose of the oxcarbazepine to see if she would tolerate it ok  Since she's still having symptoms and seems to be tolerating the medication well, recommend increasing to 300 mg in am and 300 mg in pm, will send new script but for now can take 2 of her 150 mg tablets twice daily until done  Please have there contact me in 1-2 weeks to see how she's doing        ----- Message from Brian Bowman sent at 9/22/2021 12:36 PM EDT -----  Regarding: FW: Visit Follow-Up Question  Contact: [phone]    ----- Message -----  From: Subha Kirk  Sent: 9/22/2021  12:32 PM EDT  To: [facility] Internal Med Clerical  Subject: Visit Follow-Up Question                         Link Oar  I thought I would let you know that the new medication, oxcarbazepine, has minimal affect on the trigeminal nerve spikes  I had better results with carbazepine  Unfortunately I had worse side affects  I am not sure how much the oxcarbazepine is helping  The zapping I was getting was tolerable at first, but it is happening more often and getting slightly stronger  Do you have any suggestions?

## 2021-10-09 ENCOUNTER — IMMUNIZATIONS (OUTPATIENT)
Dept: FAMILY MEDICINE CLINIC | Facility: HOSPITAL | Age: 71
End: 2021-10-09

## 2021-10-09 DIAGNOSIS — Z23 ENCOUNTER FOR IMMUNIZATION: Primary | ICD-10-CM

## 2021-10-09 PROCEDURE — 91300 SARS-COV-2 / COVID-19 MRNA VACCINE (PFIZER-BIONTECH) 30 MCG: CPT

## 2021-10-09 PROCEDURE — 0001A SARS-COV-2 / COVID-19 MRNA VACCINE (PFIZER-BIONTECH) 30 MCG: CPT

## 2021-10-29 DIAGNOSIS — G50.0 TRIGEMINAL NEURALGIA OF LEFT SIDE OF FACE: ICD-10-CM

## 2021-10-29 RX ORDER — OXCARBAZEPINE 150 MG/1
TABLET, FILM COATED ORAL
Qty: 60 TABLET | Refills: 0 | Status: SHIPPED | OUTPATIENT
Start: 2021-10-29 | End: 2021-11-24

## 2021-11-24 DIAGNOSIS — G50.0 TRIGEMINAL NEURALGIA OF LEFT SIDE OF FACE: ICD-10-CM

## 2021-11-24 RX ORDER — OXCARBAZEPINE 150 MG/1
TABLET, FILM COATED ORAL
Qty: 60 TABLET | Refills: 0 | Status: SHIPPED | OUTPATIENT
Start: 2021-11-24 | End: 2021-12-30

## 2021-12-30 DIAGNOSIS — G50.0 TRIGEMINAL NEURALGIA OF LEFT SIDE OF FACE: ICD-10-CM

## 2021-12-30 RX ORDER — OXCARBAZEPINE 150 MG/1
TABLET, FILM COATED ORAL
Qty: 60 TABLET | Refills: 0 | Status: SHIPPED | OUTPATIENT
Start: 2021-12-30 | End: 2022-01-26

## 2022-01-26 DIAGNOSIS — G50.0 TRIGEMINAL NEURALGIA OF LEFT SIDE OF FACE: ICD-10-CM

## 2022-01-26 RX ORDER — OXCARBAZEPINE 150 MG/1
TABLET, FILM COATED ORAL
Qty: 60 TABLET | Refills: 0 | Status: SHIPPED | OUTPATIENT
Start: 2022-01-26 | End: 2022-02-21

## 2022-02-04 ENCOUNTER — TELEPHONE (OUTPATIENT)
Dept: INTERNAL MEDICINE CLINIC | Facility: CLINIC | Age: 72
End: 2022-02-04

## 2022-02-20 DIAGNOSIS — G50.0 TRIGEMINAL NEURALGIA OF LEFT SIDE OF FACE: ICD-10-CM

## 2022-02-21 RX ORDER — OXCARBAZEPINE 150 MG/1
TABLET, FILM COATED ORAL
Qty: 60 TABLET | Refills: 0 | Status: SHIPPED | OUTPATIENT
Start: 2022-02-21 | End: 2022-03-22

## 2022-03-05 DIAGNOSIS — E03.9 HYPOTHYROIDISM, UNSPECIFIED TYPE: ICD-10-CM

## 2022-03-05 DIAGNOSIS — E78.00 HYPERCHOLESTEROLEMIA: ICD-10-CM

## 2022-03-07 RX ORDER — ATORVASTATIN CALCIUM 20 MG/1
TABLET, FILM COATED ORAL
Qty: 90 TABLET | Refills: 1 | Status: SHIPPED | OUTPATIENT
Start: 2022-03-07

## 2022-03-07 RX ORDER — LEVOTHYROXINE SODIUM 88 UG/1
TABLET ORAL
Qty: 90 TABLET | Refills: 1 | Status: SHIPPED | OUTPATIENT
Start: 2022-03-07

## 2022-03-10 LAB
ALBUMIN SERPL-MCNC: 4.1 G/DL (ref 3.6–5.1)
ALBUMIN/GLOB SERPL: 1.6 (CALC) (ref 1–2.5)
ALP SERPL-CCNC: 71 U/L (ref 37–153)
ALT SERPL-CCNC: 18 U/L (ref 6–29)
AST SERPL-CCNC: 19 U/L (ref 10–35)
BASOPHILS # BLD AUTO: 92 CELLS/UL (ref 0–200)
BASOPHILS NFR BLD AUTO: 1.4 %
BILIRUB SERPL-MCNC: 0.5 MG/DL (ref 0.2–1.2)
BUN SERPL-MCNC: 14 MG/DL (ref 7–25)
BUN/CREAT SERPL: ABNORMAL (CALC) (ref 6–22)
CALCIUM SERPL-MCNC: 9.6 MG/DL (ref 8.6–10.4)
CHLORIDE SERPL-SCNC: 104 MMOL/L (ref 98–110)
CHOLEST SERPL-MCNC: 179 MG/DL
CHOLEST/HDLC SERPL: 3.7 (CALC)
CO2 SERPL-SCNC: 32 MMOL/L (ref 20–32)
CREAT SERPL-MCNC: 0.81 MG/DL (ref 0.5–0.99)
EOSINOPHIL # BLD AUTO: 112 CELLS/UL (ref 15–500)
EOSINOPHIL NFR BLD AUTO: 1.7 %
ERYTHROCYTE [DISTWIDTH] IN BLOOD BY AUTOMATED COUNT: 12.2 % (ref 11–15)
GLOBULIN SER CALC-MCNC: 2.6 G/DL (CALC) (ref 1.9–3.7)
GLUCOSE SERPL-MCNC: 106 MG/DL (ref 65–99)
HBA1C MFR BLD: 5.7 % OF TOTAL HGB
HCT VFR BLD AUTO: 40.7 % (ref 35–45)
HDLC SERPL-MCNC: 49 MG/DL
HGB BLD-MCNC: 13.4 G/DL (ref 11.7–15.5)
LDLC SERPL CALC-MCNC: 105 MG/DL (CALC)
LYMPHOCYTES # BLD AUTO: 2581 CELLS/UL (ref 850–3900)
LYMPHOCYTES NFR BLD AUTO: 39.1 %
MCH RBC QN AUTO: 29.9 PG (ref 27–33)
MCHC RBC AUTO-ENTMCNC: 32.9 G/DL (ref 32–36)
MCV RBC AUTO: 90.8 FL (ref 80–100)
MONOCYTES # BLD AUTO: 713 CELLS/UL (ref 200–950)
MONOCYTES NFR BLD AUTO: 10.8 %
NEUTROPHILS # BLD AUTO: 3102 CELLS/UL (ref 1500–7800)
NEUTROPHILS NFR BLD AUTO: 47 %
NONHDLC SERPL-MCNC: 130 MG/DL (CALC)
PLATELET # BLD AUTO: 273 THOUSAND/UL (ref 140–400)
PMV BLD REES-ECKER: 10.9 FL (ref 7.5–12.5)
POTASSIUM SERPL-SCNC: 4 MMOL/L (ref 3.5–5.3)
PROT SERPL-MCNC: 6.7 G/DL (ref 6.1–8.1)
RBC # BLD AUTO: 4.48 MILLION/UL (ref 3.8–5.1)
SL AMB EGFR AFRICAN AMERICAN: 86 ML/MIN/1.73M2
SL AMB EGFR NON AFRICAN AMERICAN: 74 ML/MIN/1.73M2
SODIUM SERPL-SCNC: 143 MMOL/L (ref 135–146)
TRIGL SERPL-MCNC: 136 MG/DL
WBC # BLD AUTO: 6.6 THOUSAND/UL (ref 3.8–10.8)

## 2022-03-14 ENCOUNTER — OFFICE VISIT (OUTPATIENT)
Dept: INTERNAL MEDICINE CLINIC | Facility: CLINIC | Age: 72
End: 2022-03-14
Payer: MEDICARE

## 2022-03-14 VITALS
HEIGHT: 62 IN | WEIGHT: 146 LBS | SYSTOLIC BLOOD PRESSURE: 124 MMHG | TEMPERATURE: 97.8 F | HEART RATE: 100 BPM | BODY MASS INDEX: 26.87 KG/M2 | DIASTOLIC BLOOD PRESSURE: 80 MMHG | OXYGEN SATURATION: 98 %

## 2022-03-14 DIAGNOSIS — E55.9 VITAMIN D DEFICIENCY: ICD-10-CM

## 2022-03-14 DIAGNOSIS — G50.0 TRIGEMINAL NEURALGIA OF LEFT SIDE OF FACE: ICD-10-CM

## 2022-03-14 DIAGNOSIS — E03.9 HYPOTHYROIDISM, UNSPECIFIED TYPE: ICD-10-CM

## 2022-03-14 DIAGNOSIS — R73.03 PREDIABETES: ICD-10-CM

## 2022-03-14 DIAGNOSIS — E78.00 HYPERCHOLESTEROLEMIA: ICD-10-CM

## 2022-03-14 DIAGNOSIS — K51.919 ULCERATIVE COLITIS WITH COMPLICATION, UNSPECIFIED LOCATION (HCC): ICD-10-CM

## 2022-03-14 DIAGNOSIS — R41.9 UNSPECIFIED SYMPTOMS AND SIGNS INVOLVING COGNITIVE FUNCTIONS AND AWARENESS: ICD-10-CM

## 2022-03-14 DIAGNOSIS — R68.89 FORGETFULNESS: ICD-10-CM

## 2022-03-14 DIAGNOSIS — Z00.00 MEDICARE ANNUAL WELLNESS VISIT, SUBSEQUENT: ICD-10-CM

## 2022-03-14 DIAGNOSIS — D33.3 LEFT ACOUSTIC NEUROMA (HCC): ICD-10-CM

## 2022-03-14 DIAGNOSIS — I10 ESSENTIAL HYPERTENSION: Primary | ICD-10-CM

## 2022-03-14 PROBLEM — K51.90 ULCERATIVE COLITIS (HCC): Status: RESOLVED | Noted: 2019-08-23 | Resolved: 2022-03-14

## 2022-03-14 PROCEDURE — G0439 PPPS, SUBSEQ VISIT: HCPCS | Performed by: NURSE PRACTITIONER

## 2022-03-14 PROCEDURE — 1123F ACP DISCUSS/DSCN MKR DOCD: CPT | Performed by: NURSE PRACTITIONER

## 2022-03-14 PROCEDURE — 99214 OFFICE O/P EST MOD 30 MIN: CPT | Performed by: NURSE PRACTITIONER

## 2022-03-14 NOTE — PROGRESS NOTES
Assessment/Plan:    Hypothyroidism  On levothyroxine  Essential hypertension  Well controlled, off medication  Trigeminal neuralgia of left side of face  Rash on carbamazepine  Symptoms improved on trileptal  Will continue for now and trial weaning in 6 months  Left acoustic neuroma [facility])  Sees neurology  Last MRI stable  Vitamin D deficiency  On a daily supplement  Prediabetes  A1C 5 7%    Hypercholesterolemia  On statin  Forgetfulness  Advised to start reading or doing crossword puzzles/sodoku daily  Recommend walking 4-5 times weekly for 20 minutes  HM:  Please do cologuard kit at home  Schedule dexa  Diagnoses and all orders for this visit:    Essential hypertension    Hypothyroidism, unspecified type  -     TSH, 3rd generation with Free T4 reflex; Future    Prediabetes  -     Comprehensive metabolic panel; Future  -     Hemoglobin A1C; Future    Medicare annual wellness visit, subsequent    Left acoustic neuroma ([facility] )    Hypercholesterolemia  -     Lipid Panel with Direct LDL reflex; Future    Ulcerative colitis with complication, unspecified location ([facility] )    Vitamin D deficiency    Forgetfulness  -     Vitamin B12; Future    Unspecified symptoms and signs involving cognitive functions and awareness   -     Vitamin B12; Future    Trigeminal neuralgia of left side of face          Subjective:      Patient ID: Octavio Tristan is a 73 y o  female  Here today for follow up  Bernabe Blanchard is doing well  She has not had any electric shock sensation in her left face since starting trileptal  She denies any side effects  She would like to start walking soon  She has a treadmill but has not been motivated to start  She reports more forgetfulness lately  She forgets where she puts her phone at times  She forgets some older memories that her  brings up         The following portions of the patient's history were reviewed and updated as appropriate: allergies, current medications, past family history, past medical history, past social history, past surgical history and problem list     Review of Systems   Constitutional: Negative for activity change, appetite change, fatigue and unexpected weight change  Eyes: Negative for visual disturbance  Respiratory: Negative for cough, chest tightness and shortness of breath  Cardiovascular: Negative for chest pain, palpitations and leg swelling  Gastrointestinal: Negative for abdominal pain, blood in stool, constipation and diarrhea  Genitourinary: Negative for difficulty urinating  Musculoskeletal: Negative for arthralgias  Skin: Negative for rash  Neurological: Negative for dizziness, weakness, light-headedness and headaches  Psychiatric/Behavioral: Negative for sleep disturbance  Objective:      /80   Pulse 100   Temp 97 8 °F (36 6 °C)   Ht 5' 2" (1 575 m)   Wt 66 2 kg (146 lb)   SpO2 98%   BMI 26 70 kg/m²          Physical Exam  Vitals reviewed  Constitutional:       Appearance: She is well-developed  HENT:      Head: Normocephalic and atraumatic  Eyes:      Conjunctiva/sclera: Conjunctivae normal    Cardiovascular:      Rate and Rhythm: Normal rate and regular rhythm  Heart sounds: Normal heart sounds  Pulmonary:      Effort: Pulmonary effort is normal       Breath sounds: Normal breath sounds  Abdominal:      General: Bowel sounds are normal       Palpations: Abdomen is soft  Musculoskeletal:         General: Normal range of motion  Cervical back: Neck supple  Right lower leg: No edema  Left lower leg: No edema  Skin:     General: Skin is warm and dry  Neurological:      Mental Status: She is alert and oriented to person, place, and time  Psychiatric:         Behavior: Behavior normal        reviewed recent labs with patient

## 2022-03-14 NOTE — ASSESSMENT & PLAN NOTE
Advised to start reading or doing crossword puzzles/sodoku daily  Recommend walking 4-5 times weekly for 20 minutes

## 2022-03-14 NOTE — ASSESSMENT & PLAN NOTE
Rash on carbamazepine  Symptoms improved on trileptal  Will continue for now and trial weaning in 6 months

## 2022-03-14 NOTE — PROGRESS NOTES
Assessment and Plan:     Problem List Items Addressed This Visit        Digestive    RESOLVED: Ulcerative colitis ([address] )       Endocrine    Hypothyroidism     On levothyroxine  Relevant Orders    TSH, 3rd generation with Free T4 reflex       Cardiovascular and Mediastinum    Essential hypertension - Primary     Well controlled, off medication  Nervous and Auditory    Left acoustic neuroma [facility])     Sees neurology  Last MRI stable  Trigeminal neuralgia of left side of face     Rash on carbamazepine  Symptoms improved on trileptal  Will continue for now and trial weaning in 6 months  Other    Hypercholesterolemia     On statin  Relevant Orders    Lipid Panel with Direct LDL reflex    Vitamin D deficiency     On a daily supplement  Prediabetes     A1C 5 7%         Relevant Orders    Comprehensive metabolic panel    Hemoglobin A1C    Forgetfulness     Advised to start reading or doing crossword puzzles/sodoku daily  Recommend walking 4-5 times weekly for 20 minutes  Relevant Orders    Vitamin B12      Other Visit Diagnoses     Medicare annual wellness visit, subsequent        Unspecified symptoms and signs involving cognitive functions and awareness         Relevant Orders    Vitamin B12        BMI Counseling: Body mass index is 26 7 kg/m²  The BMI is above normal  Nutrition recommendations include decreasing portion sizes  Exercise recommendations include exercising 3-5 times per week  Rationale for BMI follow-up plan is due to patient being overweight or obese  Depression Screening and Follow-up Plan: Patient was screened for depression during today's encounter  They screened negative with a PHQ-2 score of 0  Preventive health issues were discussed with patient, and age appropriate screening tests were ordered as noted in patient's After Visit Summary    Personalized health advice and appropriate referrals for health education or preventive services given if needed, as noted in patient's After Visit Summary  History of Present Illness:     Patient presents for Medicare Annual Wellness visit    Patient Care Team:  Fadia Tejada as PCP - General (Internal Medicine)     Problem List:     Patient Active Problem List   Diagnosis    Essential hypertension    Hypercholesterolemia    Hypothyroidism    Incomplete uterovaginal prolapse    Other specified disorders of bladder    Primary osteoarthritis involving multiple joints    Vitamin D deficiency    Prediabetes    Left acoustic neuroma ([address] )    Overweight (BMI 25 0-29  9)    Benign paroxysmal positional vertigo    Trigeminal neuralgia of left side of face    Diarrhea    Chronic RUQ pain    Forgetfulness      Past Medical and Surgical History:     Past Medical History:   Diagnosis Date    Acoustic neuroma ([address] )     left ear, decreased hearing    Arthritis     right knee    Disease of thyroid gland     History of radiation therapy 2007    Left acoustic neuroma    Hyperlipidemia     Trigeminal neuralgia of left side of face     Urethral hypermobility     Uterine prolapse      Past Surgical History:   Procedure Laterality Date    COLONOSCOPY      CO CMBND ANTERPOST COLPORRAPHY W/CYSTO N/A 1/8/2019    Procedure: A&P COLPORRHAPHY;  Surgeon: Chelsey Bello MD;  [facility];  Service: UroGynecology           CO CYSTOURETHROSCOPY N/A 1/8/2019    Procedure: [address];  Surgeon: Chelsey Bello MD;  [facility];  Service: UroGynecology           CO REVAGINAL PROLAPSE,SACROSP [address] N/A 1/8/2019    Procedure: ANTERIOR VE COLPOPEXY;  Surgeon: Chelsey Bello MD;  [facility];  Service: UroGynecology           CO SLING OPER STRES INCONTINENCE N/A 1/8/2019    Procedure: PUBOVAGINAL SLING;  Surgeon: Chelsey Bello MD;  [facility];  Service: UroGynecology           TUBAL LIGATION      WISDOM TOOTH EXTRACTION        Family History: Family History   Problem Relation Age of Onset   Neri Washington Arthritis Family     Diabetes Family     Hypertension Family     Osteoporosis Family     Kidney disease Family     Thyroid disease Family     Hypertension Mother     Diabetes Father     Asthma Father     Lung disease Father     Thyroid disease Sister     Thyroid disease Daughter     Mental illness Neg Hx     Substance Abuse Neg Hx     Alcohol abuse Neg Hx     Depression Neg Hx       Social History:     Social History     Socioeconomic History    Marital status: /Civil Union     Spouse name: Marine Bell Number of children: 3    Years of education: Assoc degree +    Highest education level: None   Occupational History    Occupation: Retired   Tobacco Use    Smoking status: Never Smoker    Smokeless tobacco: Never Used   Vaping Use    Vaping Use: Never used   Substance and Sexual Activity    Alcohol use: Yes     Comment: rare    Drug use: No    Sexual activity: None   Other Topics Concern    None   Social History Narrative    Drinks coffee 1-2 cups/day     Social Determinants of Health     Financial Resource Strain: Not on file   Food Insecurity: Not on file   Transportation Needs: Not on file   Physical Activity: Not on file   Stress: Not on file   Social Connections: Not on file   Intimate Partner Violence: Not on file   Housing Stability: Not on file      Medications and Allergies:     Current Outpatient Medications   Medication Sig Dispense Refill    ammonium lactate (LAC-HYDRIN) 12 % lotion       atorvastatin (LIPITOR) 20 mg tablet TAKE 1 TABLET BY MOUTH  DAILY 90 tablet 1    b complex vitamins tablet Take 1 tablet by mouth daily      Cholecalciferol (VITAMIN D) 2000 units CAPS Take 2,000 Units by mouth daily       levothyroxine 88 mcg tablet TAKE 1 TABLET BY MOUTH  DAILY 90 tablet 1    Multiple Vitamin (MULTIVITAMIN) tablet Take 1 tablet by mouth daily      OXcarbazepine (TRILEPTAL) 150 mg tablet TAKE 1 TABLET BY MOUTH TWICE A DAY 60 tablet 0    triamcinolone (KENALOG) 0 1 % cream        No current facility-administered medications for this visit  Allergies   Allergen Reactions    Erythromycin Rash    Carbamazepine Rash      Immunizations:     Immunization History   Administered Date(s) Administered    COVID-19 PFIZER VACCINE 0 3 ML IM 10/09/2021    COVID-19, unspecified 03/16/2021, 04/06/2021    INFLUENZA 12/14/2018, 11/16/2019, 12/13/2021    Influenza Split High Dose Preservative Free IM 11/16/2019    Influenza, high dose seasonal 0 7 mL 10/10/2020      Health Maintenance:         Topic Date Due    Colorectal Cancer Screening  10/27/2015    Breast Cancer Screening: Mammogram  03/02/2023    Hepatitis C Screening  Completed         Topic Date Due    DTaP,Tdap,and Td Vaccines (1 - Tdap) Never done    Pneumococcal Vaccine: 65+ Years (1 of 1 - PPSV23) Never done      Medicare Health Risk Assessment:     /80   Pulse 100   Temp 97 8 °F (36 6 °C)   Ht 5' 2" (1 575 m)   Wt 66 2 kg (146 lb)   SpO2 98%   BMI 26 70 kg/m²      Jaret Hernandez is here for her Subsequent Wellness visit  Health Risk Assessment:   Patient rates overall health as good  Patient feels that their physical health rating is same  Patient is satisfied with their life  Eyesight was rated as same  Hearing was rated as same  Patient feels that their emotional and mental health rating is same  Patients states they are never, rarely angry  Patient states they are sometimes unusually tired/fatigued  Pain experienced in the last 7 days has been none  Patient states that she has experienced no weight loss or gain in last 6 months  Depression Screening:   PHQ-2 Score: 0      Fall Risk Screening: In the past year, patient has experienced: no history of falling in past year      Urinary Incontinence Screening:   Patient has not leaked urine accidently in the last six months       Home Safety:  Patient does not have trouble with stairs inside or outside of their home  Patient has working smoke alarms and has working carbon monoxide detector  Home safety hazards include: none  Nutrition:   Current diet is Regular  Medications:   Patient is currently taking over-the-counter supplements  OTC medications include: see medication list  Patient is able to manage medications  Activities of Daily Living (ADLs)/Instrumental Activities of Daily Living (IADLs):   Walk and transfer into and out of bed and chair?: Yes  Dress and groom yourself?: Yes    Bathe or shower yourself?: Yes    Feed yourself? Yes  Do your laundry/housekeeping?: Yes  Manage your money, pay your bills and track your expenses?: Yes  Make your own meals?: Yes    Do your own shopping?: Yes    Previous Hospitalizations:   Any hospitalizations or ED visits within the last 12 months?: No      Advance Care Planning:   Living will: Yes    Durable POA for healthcare: Yes    Advanced directive: Yes      PREVENTIVE SCREENINGS      Cardiovascular Screening:    General: Screening Not Indicated and History Lipid Disorder      Diabetes Screening:     General: Screening Current      Colorectal Cancer Screening:       Due for: Cologuard      Breast Cancer Screening:     General: Screening Current      Cervical Cancer Screening:    General: Screening Not Indicated      Osteoporosis Screening:      Due for: DXA Axial      Abdominal Aortic Aneurysm (AAA) Screening:        General: Screening Not Indicated      Lung Cancer Screening:     General: Screening Not Indicated      Hepatitis C Screening:    General: Screening Current    Screening, Brief Intervention, and Referral to Treatment (SBIRT)    Screening  Typical number of drinks in a day: 0  Typical number of drinks in a week: 0  Interpretation: Low risk drinking behavior      Single Item Drug Screening:  How often have you used an illegal drug (including marijuana) or a prescription medication for non-medical reasons in the past year? never    Single Item Drug Screen Score: 0  Interpretation: Negative screen for possible drug use disorder      ELAYNE Cervantes

## 2022-03-22 DIAGNOSIS — G50.0 TRIGEMINAL NEURALGIA OF LEFT SIDE OF FACE: ICD-10-CM

## 2022-03-22 RX ORDER — OXCARBAZEPINE 150 MG/1
TABLET, FILM COATED ORAL
Qty: 60 TABLET | Refills: 0 | Status: SHIPPED | OUTPATIENT
Start: 2022-03-22 | End: 2022-04-18

## 2022-04-18 DIAGNOSIS — G50.0 TRIGEMINAL NEURALGIA OF LEFT SIDE OF FACE: ICD-10-CM

## 2022-04-18 RX ORDER — OXCARBAZEPINE 150 MG/1
TABLET, FILM COATED ORAL
Qty: 60 TABLET | Refills: 0 | Status: SHIPPED | OUTPATIENT
Start: 2022-04-18 | End: 2022-05-23

## 2022-05-22 DIAGNOSIS — G50.0 TRIGEMINAL NEURALGIA OF LEFT SIDE OF FACE: ICD-10-CM

## 2022-05-23 RX ORDER — OXCARBAZEPINE 150 MG/1
TABLET, FILM COATED ORAL
Qty: 60 TABLET | Refills: 0 | Status: SHIPPED | OUTPATIENT
Start: 2022-05-23 | End: 2022-06-27

## 2022-06-26 DIAGNOSIS — G50.0 TRIGEMINAL NEURALGIA OF LEFT SIDE OF FACE: ICD-10-CM

## 2022-06-27 RX ORDER — OXCARBAZEPINE 150 MG/1
TABLET, FILM COATED ORAL
Qty: 60 TABLET | Refills: 0 | Status: SHIPPED | OUTPATIENT
Start: 2022-06-27 | End: 2022-08-01

## 2022-07-30 DIAGNOSIS — G50.0 TRIGEMINAL NEURALGIA OF LEFT SIDE OF FACE: ICD-10-CM

## 2022-08-01 RX ORDER — OXCARBAZEPINE 150 MG/1
TABLET, FILM COATED ORAL
Qty: 60 TABLET | Refills: 0 | Status: SHIPPED | OUTPATIENT
Start: 2022-08-01 | End: 2022-08-29

## 2022-08-03 ENCOUNTER — TELEPHONE (OUTPATIENT)
Dept: INTERNAL MEDICINE CLINIC | Facility: CLINIC | Age: 72
End: 2022-08-03

## 2022-08-04 DIAGNOSIS — Z12.11 SCREENING FOR COLON CANCER: Primary | ICD-10-CM

## 2022-08-29 DIAGNOSIS — G50.0 TRIGEMINAL NEURALGIA OF LEFT SIDE OF FACE: ICD-10-CM

## 2022-08-29 RX ORDER — OXCARBAZEPINE 150 MG/1
TABLET, FILM COATED ORAL
Qty: 60 TABLET | Refills: 0 | Status: SHIPPED | OUTPATIENT
Start: 2022-08-29 | End: 2022-09-28

## 2022-09-04 LAB — COLOGUARD RESULT REPORTABLE: NEGATIVE

## 2022-09-08 LAB
ALBUMIN SERPL-MCNC: 4.3 G/DL (ref 3.6–5.1)
ALBUMIN/GLOB SERPL: 1.8 (CALC) (ref 1–2.5)
ALP SERPL-CCNC: 79 U/L (ref 37–153)
ALT SERPL-CCNC: 25 U/L (ref 6–29)
AST SERPL-CCNC: 26 U/L (ref 10–35)
BILIRUB SERPL-MCNC: 0.6 MG/DL (ref 0.2–1.2)
BUN SERPL-MCNC: 16 MG/DL (ref 7–25)
BUN/CREAT SERPL: NORMAL (CALC) (ref 6–22)
CALCIUM SERPL-MCNC: 9.8 MG/DL (ref 8.6–10.4)
CHLORIDE SERPL-SCNC: 103 MMOL/L (ref 98–110)
CHOLEST SERPL-MCNC: 174 MG/DL
CHOLEST/HDLC SERPL: 4 (CALC)
CO2 SERPL-SCNC: 32 MMOL/L (ref 20–32)
CREAT SERPL-MCNC: 0.9 MG/DL (ref 0.6–1)
GFR/BSA.PRED SERPLBLD CYS-BASED-ARV: 69 ML/MIN/1.73M2
GLOBULIN SER CALC-MCNC: 2.4 G/DL (CALC) (ref 1.9–3.7)
GLUCOSE SERPL-MCNC: 99 MG/DL (ref 65–99)
HBA1C MFR BLD: 5.7 % OF TOTAL HGB
HDLC SERPL-MCNC: 43 MG/DL
LDLC SERPL CALC-MCNC: 109 MG/DL (CALC)
NONHDLC SERPL-MCNC: 131 MG/DL (CALC)
POTASSIUM SERPL-SCNC: 4.6 MMOL/L (ref 3.5–5.3)
PROT SERPL-MCNC: 6.7 G/DL (ref 6.1–8.1)
SODIUM SERPL-SCNC: 142 MMOL/L (ref 135–146)
TRIGL SERPL-MCNC: 112 MG/DL
TSH SERPL-ACNC: 2.13 MIU/L (ref 0.4–4.5)
VIT B12 SERPL-MCNC: 546 PG/ML (ref 200–1100)

## 2022-09-13 DIAGNOSIS — E03.9 HYPOTHYROIDISM, UNSPECIFIED TYPE: ICD-10-CM

## 2022-09-13 DIAGNOSIS — E78.00 HYPERCHOLESTEROLEMIA: ICD-10-CM

## 2022-09-13 RX ORDER — ATORVASTATIN CALCIUM 20 MG/1
TABLET, FILM COATED ORAL
Qty: 90 TABLET | Refills: 1 | Status: SHIPPED | OUTPATIENT
Start: 2022-09-13

## 2022-09-13 RX ORDER — LEVOTHYROXINE SODIUM 88 UG/1
TABLET ORAL
Qty: 90 TABLET | Refills: 1 | Status: SHIPPED | OUTPATIENT
Start: 2022-09-13

## 2022-09-15 ENCOUNTER — OFFICE VISIT (OUTPATIENT)
Dept: INTERNAL MEDICINE CLINIC | Facility: CLINIC | Age: 72
End: 2022-09-15
Payer: MEDICARE

## 2022-09-15 VITALS
HEART RATE: 81 BPM | DIASTOLIC BLOOD PRESSURE: 88 MMHG | HEIGHT: 62 IN | WEIGHT: 147 LBS | OXYGEN SATURATION: 97 % | TEMPERATURE: 97.7 F | SYSTOLIC BLOOD PRESSURE: 130 MMHG | BODY MASS INDEX: 27.05 KG/M2

## 2022-09-15 DIAGNOSIS — R73.03 PREDIABETES: ICD-10-CM

## 2022-09-15 DIAGNOSIS — Z12.31 SCREENING MAMMOGRAM FOR BREAST CANCER: ICD-10-CM

## 2022-09-15 DIAGNOSIS — G50.0 TRIGEMINAL NEURALGIA OF LEFT SIDE OF FACE: Primary | ICD-10-CM

## 2022-09-15 DIAGNOSIS — I10 ESSENTIAL HYPERTENSION: ICD-10-CM

## 2022-09-15 DIAGNOSIS — E78.00 HYPERCHOLESTEROLEMIA: ICD-10-CM

## 2022-09-15 DIAGNOSIS — E03.9 HYPOTHYROIDISM, UNSPECIFIED TYPE: ICD-10-CM

## 2022-09-15 DIAGNOSIS — D33.3 LEFT ACOUSTIC NEUROMA (HCC): ICD-10-CM

## 2022-09-15 PROCEDURE — 99214 OFFICE O/P EST MOD 30 MIN: CPT | Performed by: NURSE PRACTITIONER

## 2022-09-15 NOTE — PROGRESS NOTES
Name: Jaime Jeffers      : 1950      MRN: [id number]  Encounter Provider: ELAYNE Wolf  Encounter Date: 9/15/2022   Encounter department: [facility]     1  Trigeminal neuralgia of left side of face  Assessment & Plan:  Discussed possibly starting to wean off trileptal  She would like to wait until after the holidays  Advised to call for medication instructions- recommend trileptal 1/2 tablet (75mg) twice daily for 1 month then 1/2 tablet once daily for once month then stop unless symptoms reoccur  Orders:  -     CBC and differential; Future    2  Left acoustic neuroma [facility])  Assessment & Plan:  MRI stable  Sees neuro  3  Prediabetes  Assessment & Plan:  A1C 5 7%  Continue to watch carbs and sugars  Orders:  -     Hemoglobin A1C; Future  -     Comprehensive metabolic panel; Future    4  Essential hypertension  Assessment & Plan:  Off medication  Blood pressure is borderline  Continue to monitor  5  Hypothyroidism, unspecified type  Assessment & Plan:  Adequately replaced  6  Hypercholesterolemia  Assessment & Plan:  Lipids at goal  Continue statin     Orders:  -     Lipid Panel with Direct LDL reflex; Future    7  Screening mammogram for breast cancer  -     Mammo screening bilateral w 3d & cad; Future; Expected date: 09/15/2022         Subjective      Bello Diaz is here today for follow up  She is doing well overall  She still occasionally gets a sharp quick pain in the left ear  She denies any headaches or vision changes  She would like to try weaning off the tripletal however not until after the holidays  She has a trip coming up and family visiting soon  She did start exercising this week  She is lifting some light weights and doing the bike  She would like to walk outside but the weather has been too hot                 Review of Systems   Constitutional: Negative for activity change, appetite change, fatigue and unexpected weight change  Eyes: Negative for visual disturbance  Respiratory: Negative for cough and shortness of breath  Cardiovascular: Negative for chest pain, palpitations and leg swelling  Gastrointestinal: Negative for abdominal pain, blood in stool, constipation and diarrhea  Genitourinary: Negative for difficulty urinating  Musculoskeletal: Negative for arthralgias  Skin: Negative for rash  Neurological: Negative for dizziness, weakness, light-headedness and headaches  Psychiatric/Behavioral: Negative for sleep disturbance  Current Outpatient Medications on File Prior to Visit   Medication Sig    ammonium lactate (LAC-HYDRIN) 12 % lotion     atorvastatin (LIPITOR) 20 mg tablet TAKE 1 TABLET BY MOUTH  DAILY    b complex vitamins tablet Take 1 tablet by mouth daily    Cholecalciferol (VITAMIN D) 2000 units CAPS Take 2,000 Units by mouth daily     levothyroxine 88 mcg tablet TAKE 1 TABLET BY MOUTH  DAILY    Multiple Vitamin (MULTIVITAMIN) tablet Take 1 tablet by mouth daily    OXcarbazepine (TRILEPTAL) 150 mg tablet TAKE 1 TABLET BY MOUTH TWICE A DAY    triamcinolone (KENALOG) 0 1 % cream        Objective     /88   Pulse 81   Temp 97 7 °F (36 5 °C)   Ht 5' 2" (1 575 m)   Wt 66 7 kg (147 lb)   SpO2 97%   BMI 26 89 kg/m²     Physical Exam  Vitals reviewed  Constitutional:       Appearance: She is well-developed  HENT:      Head: Normocephalic and atraumatic  Eyes:      Conjunctiva/sclera: Conjunctivae normal    Cardiovascular:      Rate and Rhythm: Normal rate and regular rhythm  Heart sounds: Normal heart sounds  Pulmonary:      Effort: Pulmonary effort is normal       Breath sounds: Normal breath sounds  Abdominal:      General: Bowel sounds are normal       Palpations: Abdomen is soft  Musculoskeletal:         General: Normal range of motion  Cervical back: Neck supple  Right lower leg: No edema  Left lower leg: No edema  Skin:     General: Skin is warm and dry  Neurological:      Mental Status: She is alert and oriented to person, place, and time     Psychiatric:         Behavior: Behavior normal        ELAYNE Rios     Reviewed recent labs with patient

## 2022-09-15 NOTE — ASSESSMENT & PLAN NOTE
Discussed possibly starting to wean off trileptal  She would like to wait until after the holidays  Advised to call for medication instructions- recommend trileptal 1/2 tablet (75mg) twice daily for 1 month then 1/2 tablet once daily for once month then stop unless symptoms reoccur

## 2022-09-28 DIAGNOSIS — G50.0 TRIGEMINAL NEURALGIA OF LEFT SIDE OF FACE: ICD-10-CM

## 2022-09-28 RX ORDER — OXCARBAZEPINE 150 MG/1
TABLET, FILM COATED ORAL
Qty: 60 TABLET | Refills: 0 | Status: SHIPPED | OUTPATIENT
Start: 2022-09-28

## 2022-11-07 DIAGNOSIS — G50.0 TRIGEMINAL NEURALGIA OF LEFT SIDE OF FACE: ICD-10-CM

## 2022-11-07 RX ORDER — OXCARBAZEPINE 150 MG/1
TABLET, FILM COATED ORAL
Qty: 60 TABLET | Refills: 0 | Status: SHIPPED | OUTPATIENT
Start: 2022-11-07

## 2022-12-06 DIAGNOSIS — G50.0 TRIGEMINAL NEURALGIA OF LEFT SIDE OF FACE: ICD-10-CM

## 2022-12-06 RX ORDER — OXCARBAZEPINE 150 MG/1
TABLET, FILM COATED ORAL
Qty: 60 TABLET | Refills: 0 | Status: SHIPPED | OUTPATIENT
Start: 2022-12-06

## 2023-01-09 DIAGNOSIS — G50.0 TRIGEMINAL NEURALGIA OF LEFT SIDE OF FACE: ICD-10-CM

## 2023-01-09 RX ORDER — OXCARBAZEPINE 150 MG/1
TABLET, FILM COATED ORAL
Qty: 60 TABLET | Refills: 0 | Status: SHIPPED | OUTPATIENT
Start: 2023-01-09

## 2023-02-08 DIAGNOSIS — G50.0 TRIGEMINAL NEURALGIA OF LEFT SIDE OF FACE: ICD-10-CM

## 2023-02-08 RX ORDER — OXCARBAZEPINE 150 MG/1
TABLET, FILM COATED ORAL
Qty: 60 TABLET | Refills: 0 | Status: SHIPPED | OUTPATIENT
Start: 2023-02-08

## 2023-03-03 DIAGNOSIS — Z12.31 SCREENING MAMMOGRAM FOR BREAST CANCER: ICD-10-CM

## 2023-03-10 DIAGNOSIS — G50.0 TRIGEMINAL NEURALGIA OF LEFT SIDE OF FACE: ICD-10-CM

## 2023-03-10 RX ORDER — OXCARBAZEPINE 150 MG/1
TABLET, FILM COATED ORAL
Qty: 60 TABLET | Refills: 0 | Status: SHIPPED | OUTPATIENT
Start: 2023-03-10

## 2023-03-12 LAB
ALBUMIN SERPL-MCNC: 4.1 G/DL (ref 3.6–5.1)
ALBUMIN/GLOB SERPL: 1.4 (CALC) (ref 1–2.5)
ALP SERPL-CCNC: 73 U/L (ref 37–153)
ALT SERPL-CCNC: 23 U/L (ref 6–29)
AST SERPL-CCNC: 24 U/L (ref 10–35)
BASOPHILS # BLD AUTO: 59 CELLS/UL (ref 0–200)
BASOPHILS NFR BLD AUTO: 0.9 %
BILIRUB SERPL-MCNC: 0.6 MG/DL (ref 0.2–1.2)
BUN SERPL-MCNC: 15 MG/DL (ref 7–25)
BUN/CREAT SERPL: ABNORMAL (CALC) (ref 6–22)
CALCIUM SERPL-MCNC: 9.4 MG/DL (ref 8.6–10.4)
CHLORIDE SERPL-SCNC: 102 MMOL/L (ref 98–110)
CHOLEST SERPL-MCNC: 171 MG/DL
CHOLEST/HDLC SERPL: 4.2 (CALC)
CO2 SERPL-SCNC: 31 MMOL/L (ref 20–32)
CREAT SERPL-MCNC: 0.86 MG/DL (ref 0.6–1)
EOSINOPHIL # BLD AUTO: 98 CELLS/UL (ref 15–500)
EOSINOPHIL NFR BLD AUTO: 1.5 %
ERYTHROCYTE [DISTWIDTH] IN BLOOD BY AUTOMATED COUNT: 13 % (ref 11–15)
GFR/BSA.PRED SERPLBLD CYS-BASED-ARV: 73 ML/MIN/1.73M2
GLOBULIN SER CALC-MCNC: 2.9 G/DL (CALC) (ref 1.9–3.7)
GLUCOSE SERPL-MCNC: 103 MG/DL (ref 65–99)
HBA1C MFR BLD: 5.8 % OF TOTAL HGB
HCT VFR BLD AUTO: 41.7 % (ref 35–45)
HDLC SERPL-MCNC: 41 MG/DL
HGB BLD-MCNC: 13.9 G/DL (ref 11.7–15.5)
LDLC SERPL CALC-MCNC: 108 MG/DL (CALC)
LYMPHOCYTES # BLD AUTO: 2119 CELLS/UL (ref 850–3900)
LYMPHOCYTES NFR BLD AUTO: 32.6 %
MCH RBC QN AUTO: 30.7 PG (ref 27–33)
MCHC RBC AUTO-ENTMCNC: 33.3 G/DL (ref 32–36)
MCV RBC AUTO: 92.1 FL (ref 80–100)
MONOCYTES # BLD AUTO: 683 CELLS/UL (ref 200–950)
MONOCYTES NFR BLD AUTO: 10.5 %
NEUTROPHILS # BLD AUTO: 3543 CELLS/UL (ref 1500–7800)
NEUTROPHILS NFR BLD AUTO: 54.5 %
NONHDLC SERPL-MCNC: 130 MG/DL (CALC)
PLATELET # BLD AUTO: 268 THOUSAND/UL (ref 140–400)
PMV BLD REES-ECKER: 11 FL (ref 7.5–12.5)
POTASSIUM SERPL-SCNC: 4.4 MMOL/L (ref 3.5–5.3)
PROT SERPL-MCNC: 7 G/DL (ref 6.1–8.1)
RBC # BLD AUTO: 4.53 MILLION/UL (ref 3.8–5.1)
SODIUM SERPL-SCNC: 140 MMOL/L (ref 135–146)
TRIGL SERPL-MCNC: 116 MG/DL
WBC # BLD AUTO: 6.5 THOUSAND/UL (ref 3.8–10.8)

## 2023-03-14 ENCOUNTER — HOSPITAL ENCOUNTER (OUTPATIENT)
Dept: RADIOLOGY | Age: 73
Discharge: HOME | End: 2023-03-14

## 2023-03-14 VITALS — BODY MASS INDEX: 26.58 KG/M2 | WEIGHT: 150 LBS | HEIGHT: 63 IN

## 2023-03-14 DIAGNOSIS — Z13.820 ENCOUNTER FOR SCREENING FOR OSTEOPOROSIS: ICD-10-CM

## 2023-03-15 ENCOUNTER — OFFICE VISIT (OUTPATIENT)
Dept: INTERNAL MEDICINE CLINIC | Facility: CLINIC | Age: 73
End: 2023-03-15

## 2023-03-15 VITALS
WEIGHT: 149 LBS | BODY MASS INDEX: 26.4 KG/M2 | HEART RATE: 87 BPM | TEMPERATURE: 97.7 F | SYSTOLIC BLOOD PRESSURE: 128 MMHG | OXYGEN SATURATION: 98 % | HEIGHT: 63 IN | DIASTOLIC BLOOD PRESSURE: 80 MMHG

## 2023-03-15 DIAGNOSIS — G50.0 TRIGEMINAL NEURALGIA OF LEFT SIDE OF FACE: ICD-10-CM

## 2023-03-15 DIAGNOSIS — E78.00 HYPERCHOLESTEROLEMIA: ICD-10-CM

## 2023-03-15 DIAGNOSIS — E03.9 HYPOTHYROIDISM, UNSPECIFIED TYPE: ICD-10-CM

## 2023-03-15 DIAGNOSIS — I10 ESSENTIAL HYPERTENSION: ICD-10-CM

## 2023-03-15 DIAGNOSIS — D33.3 LEFT ACOUSTIC NEUROMA (HCC): ICD-10-CM

## 2023-03-15 DIAGNOSIS — Z00.00 MEDICARE ANNUAL WELLNESS VISIT, SUBSEQUENT: Primary | ICD-10-CM

## 2023-03-15 DIAGNOSIS — R73.03 PREDIABETES: ICD-10-CM

## 2023-03-15 DIAGNOSIS — E55.9 VITAMIN D DEFICIENCY: ICD-10-CM

## 2023-03-15 RX ORDER — LEVOTHYROXINE SODIUM 88 UG/1
88 TABLET ORAL DAILY
Qty: 90 TABLET | Refills: 1 | Status: SHIPPED | OUTPATIENT
Start: 2023-03-15

## 2023-03-15 RX ORDER — ATORVASTATIN CALCIUM 20 MG/1
20 TABLET, FILM COATED ORAL DAILY
Qty: 90 TABLET | Refills: 1 | Status: SHIPPED | OUTPATIENT
Start: 2023-03-15

## 2023-03-15 NOTE — PATIENT INSTRUCTIONS
Urinary Incontinence   AMBULATORY CARE:   Urinary incontinence (UI)  is when you lose control of your bladder  UI develops because your bladder cannot store or empty urine properly  The 3 most common types of UI are stress incontinence, urge incontinence, or both  Common symptoms include the following:   • You feel like your bladder does not empty completely when you urinate  • You urinate often and need to urinate immediately  • You leak urine when you sleep, or you wake up with the urge to urinate  • You leak urine when you cough, sneeze, exercise, or laugh  Call your doctor if:   • You have severe pain  • You are confused or cannot think clearly  • You have a fever  • You see blood in your urine  • You have pain when you urinate  • You have new or worse pain, even after treatment  • Your mouth feels dry or you have vision changes  • Your urine is cloudy or smells bad  • You have questions or concerns about your condition or care  Medicines:   • Medicines  may be given to help strengthen your bladder control  • Take your medicine as directed  Contact your healthcare provider if you think your medicine is not helping or if you have side effects  Tell your provider if you are allergic to any medicine  Keep a list of the medicines, vitamins, and herbs you take  Include the amounts, and when and why you take them  Bring the list or the pill bottles to follow-up visits  Carry your medicine list with you in case of an emergency  Do pelvic muscle exercises often:  Your pelvic muscles help you stop urinating  Squeeze these muscles tight for 5 seconds, then relax for 5 seconds  Gradually work up to squeezing for 10 seconds  Do 3 sets of 15 repetitions a day, or as directed  This will help strengthen your pelvic muscles and improve bladder control  Train your bladder:  Go to the bathroom at set times, such as every 2 hours, even if you do not feel the urge to go   You can also try to hold your urine when you feel the urge to go  For example, hold your urine for 5 minutes when you feel the urge to go  As that becomes easier, hold your urine for 10 minutes  Self-care:   • Keep a UI record  Write down how often you leak urine and how much you leak  Make a note of what you were doing when you leaked urine  • Drink liquids as directed  Ask your healthcare provider how much liquid to drink each day and which liquids are best for you  You may need to limit the amount of liquid you drink to help control your urine leakage  Do not drink any liquid right before you go to bed  Limit or do not have drinks that contain caffeine or alcohol  • Prevent constipation  Eat a variety of high-fiber foods  Good examples are high-fiber cereals, beans, vegetables, and whole-grain breads  Prune juice may help make your bowel movement softer  Walking is the best way to trigger your intestines to have a bowel movement  • Exercise regularly and maintain a healthy weight  Ask your healthcare provider how much you should weigh and about the best exercise plan for you  Weight loss and exercise will decrease pressure on your bladder and help you control your leakage  Ask him or her to help you create a weight loss plan if you are overweight  • Use a catheter as directed  to help empty your bladder  A catheter is a tiny, plastic tube that is put into your bladder to drain your urine  Your healthcare provider may tell you to use a catheter to prevent your bladder from getting too full and leaking urine  • Go to behavior therapy as directed  Behavior therapy may be used to help you learn to control your urge to urinate  Follow up with your doctor as directed:  Write down your questions so you remember to ask them during your visits  © Copyright Dylon Alonso 2022 Information is for End User's use only and may not be sold, redistributed or otherwise used for commercial purposes    The above information is an  only  It is not intended as medical advice for individual conditions or treatments  Talk to your doctor, nurse or pharmacist before following any medical regimen to see if it is safe and effective for you  Kegel Exercises for Women   AMBULATORY CARE:   Kegel exercises  help strengthen your pelvic muscles  Pelvic muscles hold your pelvic organs, such as your bladder and uterus, in place  Kegel exercises help prevent or control certain conditions, such as urine incontinence (leakage) or uterine prolapse  Call your doctor or physical therapist if:   • You cannot feel your muscles tighten or relax  • You continue to leak urine  • You have questions or concerns about your condition or care  Use the correct muscles:  Pelvic muscles are the muscles you use to control urine flow  To target these muscles, stop and start the flow of urine several times  This will help you become familiar with how it feels to tighten and relax these muscles  How to do Kegel exercises:   • Get into a comfortable position  You may lie down, stand up, or sit down to do these exercises  When you first try to do these exercises, it may be easier if you lie down  • Tighten or squeeze your pelvic muscles slowly  It may feel like you are trying to hold back urine or gas  Hold this position for 3 seconds  Relax for 3 seconds  Repeat this cycle 10 times  Do not hold your breath when you do Kegel exercises  Keep your stomach, back, and leg muscles relaxed  • Do 10 sets of Kegel exercises, at least 3 times a day  When you know how to do Kegel exercises, use different positions  This will help to strengthen your pelvic muscles as much as possible  You can do these exercises while you lie on the floor, watch TV, or while you stand  Tighten your pelvic muscles before you sneeze, cough, or lift to prevent urine leakage  You may notice improved bladder control within about 6 weeks      Follow up with your doctor or physical therapist as directed:  Write down your questions so you remember to ask them during your visits  © Matt López 2022 Information is for End User's use only and may not be sold, redistributed or otherwise used for commercial purposes  The above information is an  only  It is not intended as medical advice for individual conditions or treatments  Talk to your doctor, nurse or pharmacist before following any medical regimen to see if it is safe and effective for you

## 2023-03-15 NOTE — PROGRESS NOTES
Assessment and Plan:     Problem List Items Addressed This Visit        Endocrine    Hypothyroidism     On levothyroxine  Relevant Medications    levothyroxine 88 mcg tablet    Other Relevant Orders    TSH, 3rd generation with Free T4 reflex       Cardiovascular and Mediastinum    Essential hypertension     Stable  Off medication            Nervous and Auditory    Left acoustic neuroma [facility])     Sees neurology  Trigeminal neuralgia of left side of face     Discussed starting to wean off trileptal  She can take 1/2 tablet (75mg) twice daily for 1 month then decrease to 75 mg once daily for 1 month then stop unless symptoms reoccur  Other    Hypercholesterolemia     On statin  Recommend walking 3-4 times weekly          Relevant Medications    atorvastatin (LIPITOR) 20 mg tablet    Other Relevant Orders    Lipid Panel with Direct LDL reflex    Vitamin D deficiency     Continue vit d3 supplement          Prediabetes     A1C 5 8%  Reduce carbohydrates and sweets         Relevant Orders    Hemoglobin A1C    Comprehensive metabolic panel   Other Visit Diagnoses     Medicare annual wellness visit, subsequent    -  Primary        BMI Counseling: Body mass index is 26 82 kg/m²  The BMI is above normal  Nutrition recommendations include decreasing portion sizes  Exercise recommendations include exercising 3-5 times per week  Rationale for BMI follow-up plan is due to patient being overweight or obese  Depression Screening and Follow-up Plan: Patient was screened for depression during today's encounter  They screened negative with a PHQ-2 score of 0  Preventive health issues were discussed with patient, and age appropriate screening tests were ordered as noted in patient's After Visit Summary  Personalized health advice and appropriate referrals for health education or preventive services given if needed, as noted in patient's After Visit Summary       History of Present Illness: Patient presents for a Medicare Wellness Visit    Priya Blanco is here today for follow up  She is doing well  She has not started weaning off trileptal  She still gets a very rare shock up her left side of her head  She has been exercising once a week, but not consistently  She has not been watching her diet  She has been experiencing and area in her left thigh that feels different when she touches it  It is slightly numb and feels bruised  She denies any rash or discoloration  No back pain or leg weakness  Patient Care Team:  Edel Jose as PCP - General (Internal Medicine)     Review of Systems:     Review of Systems   Constitutional: Negative for activity change, appetite change, fatigue and unexpected weight change  Eyes: Negative for visual disturbance  Respiratory: Negative for cough and shortness of breath  Cardiovascular: Negative for chest pain, palpitations and leg swelling  Gastrointestinal: Negative for abdominal pain, blood in stool, constipation and diarrhea  Genitourinary: Negative for difficulty urinating  Musculoskeletal: Negative for arthralgias  Skin: Negative for rash  Neurological: Negative for dizziness, light-headedness and headaches  Psychiatric/Behavioral: Negative for sleep disturbance  Problem List:     Patient Active Problem List   Diagnosis   • Essential hypertension   • Hypercholesterolemia   • Hypothyroidism   • Incomplete uterovaginal prolapse   • Other specified disorders of bladder   • Primary osteoarthritis involving multiple joints   • Vitamin D deficiency   • Prediabetes   • Left acoustic neuroma ([address] )   • Overweight (BMI 25 0-29  9)   • Benign paroxysmal positional vertigo   • Trigeminal neuralgia of left side of face   • Diarrhea   • Chronic RUQ pain   • Forgetfulness      Past Medical and Surgical History:     Past Medical History:   Diagnosis Date   • Acoustic neuroma ([address] )     left ear, decreased hearing   • Arthritis right knee   • Disease of thyroid gland    • History of radiation therapy 2007    Left acoustic neuroma   • Hyperlipidemia    • Trigeminal neuralgia of left side of face    • Urethral hypermobility    • Uterine prolapse      Past Surgical History:   Procedure Laterality Date   • COLONOSCOPY     • ND CMBND ANTERPOST COLPORRAPHY W/CYSTO N/A 1/8/2019    Procedure: A&P COLPORRHAPHY;  Surgeon: Josué Ascencio MD;  Location[facility];  Service: UroGynecology          • ND COLPOPEXY VAGINAL EXTRAPERITONEAL APPROACH N/A 1/8/2019    Procedure: ANTERIOR VE COLPOPEXY;  Surgeon: Josué Ascencio MD;  [facility];  Service: UroGynecology          • ND CYSTOURETHROSCOPY N/A 1/8/2019    Procedure: Jimmy Perlaly;  Surgeon: Josué Ascencio MD;  [facility];  Service: UroGynecology          • ND SLING OPERATION STRESS INCONTINENCE N/A 1/8/2019    Procedure: PUBOVAGINAL SLING;  Surgeon: Josué Ascencio MD;  [facility];  Service: UroGynecology          • TUBAL LIGATION     • WISDOM TOOTH EXTRACTION        Family History:     Family History   Problem Relation Age of Onset   • Arthritis Family    • Diabetes Family    • Hypertension Family    • Osteoporosis Family    • Kidney disease Family    • Thyroid disease Family    • Hypertension Mother    • Diabetes Father    • Asthma Father    • Lung disease Father    • Thyroid disease Sister    • Thyroid disease Daughter    • Mental illness Neg Hx    • Substance Abuse Neg Hx    • Alcohol abuse Neg Hx    • Depression Neg Hx       Social History:     Social History     Socioeconomic History   • Marital status: /Civil Union     Spouse name: Lazarus Dillon   • Number of children: 3   • Years of education: Assoc degree +   • Highest education level: None   Occupational History   • Occupation: Retired   Tobacco Use   • Smoking status: Never   • Smokeless tobacco: Never   Vaping Use   • Vaping Use: Never used   Substance and Sexual Activity   • Alcohol use:  Yes Comment: rare   • Drug use: No   • Sexual activity: None   Other Topics Concern   • None   Social History Narrative    Drinks coffee 1-2 cups/day     Social Determinants of Health     Financial Resource Strain: Low Risk    • Difficulty of Paying Living Expenses: Not hard at all   Food Insecurity: Not on file   Transportation Needs: No Transportation Needs   • Lack of Transportation (Medical): No   • Lack of Transportation (Non-Medical): No   Physical Activity: Not on file   Stress: Not on file   Social Connections: Not on file   Intimate Partner Violence: Not on file   Housing Stability: Not on file      Medications and Allergies:     Current Outpatient Medications   Medication Sig Dispense Refill   • atorvastatin (LIPITOR) 20 mg tablet Take 1 tablet (20 mg total) by mouth daily 90 tablet 1   • levothyroxine 88 mcg tablet Take 1 tablet (88 mcg total) by mouth daily 90 tablet 1   • OXcarbazepine (TRILEPTAL) 150 mg tablet TAKE 1 TABLET BY MOUTH TWICE A DAY 60 tablet 0   • b complex vitamins tablet Take 1 tablet by mouth daily     • Cholecalciferol (VITAMIN D) 2000 units CAPS Take 2,000 Units by mouth daily      • Multiple Vitamin (MULTIVITAMIN) tablet Take 1 tablet by mouth daily     • triamcinolone (KENALOG) 0 1 % cream        No current facility-administered medications for this visit       Allergies   Allergen Reactions   • Erythromycin Rash   • Carbamazepine Rash      Immunizations:     Immunization History   Administered Date(s) Administered   • COVID-19 PFIZER VACCINE 0 3 ML IM 03/16/2021, 04/06/2021, 10/09/2021   • COVID-19, unspecified 03/16/2021, 04/06/2021   • INFLUENZA 12/14/2018, 11/16/2019, 12/13/2021   • Influenza Split High Dose Preservative Free IM 11/16/2019   • Influenza, high dose seasonal 0 7 mL 10/10/2020      Health Maintenance:         Topic Date Due   • Breast Cancer Screening: Mammogram  03/02/2024   • Colorectal Cancer Screening  08/30/2025   • Hepatitis C Screening  Completed         Topic Date Due   • Pneumococcal Vaccine: 65+ Years (1 - PCV) Never done   • COVID-19 Vaccine (4 - Booster) 12/04/2021   • Influenza Vaccine (1) 09/01/2022      Medicare Screening Tests and Risk Assessments:     Rudolph Schmid is here for her Subsequent Wellness visit  Last Medicare Wellness visit information reviewed, patient interviewed and updates made to the record today  Health Risk Assessment:   Patient rates overall health as good  Patient feels that their physical health rating is same  Patient is satisfied with their life  Eyesight was rated as same  Hearing was rated as slightly worse  Patient feels that their emotional and mental health rating is same  Patients states they are never, rarely angry  Patient states they are sometimes unusually tired/fatigued  Pain experienced in the last 7 days has been some  Patient's pain rating has been 2/10  Patient states that she has experienced no weight loss or gain in last 6 months  Depression Screening:   PHQ-2 Score: 0      Fall Risk Screening: In the past year, patient has experienced: history of falling in past year    Number of falls: 1  Injured during fall?: No    Feels unsteady when standing or walking?: Yes    Worried about falling?: No      Urinary Incontinence Screening:   Patient has leaked urine accidently in the last six months  Home Safety:  Patient does not have trouble with stairs inside or outside of their home  Patient has working smoke alarms and has working carbon monoxide detector  Home safety hazards include: none  Nutrition:   Current diet is Regular  Medications:   Patient is currently taking over-the-counter supplements  OTC medications include: see medication list  Patient is able to manage medications  Activities of Daily Living (ADLs)/Instrumental Activities of Daily Living (IADLs):   Walk and transfer into and out of bed and chair?: Yes  Dress and groom yourself?: Yes    Bathe or shower yourself?: Yes    Feed yourself? Yes  Do your laundry/housekeeping?: Yes  Manage your money, pay your bills and track your expenses?: Yes  Make your own meals?: Yes    Do your own shopping?: Yes    Previous Hospitalizations:   Any hospitalizations or ED visits within the last 12 months?: No      Advance Care Planning:   Living will: Yes    Advanced directive: Yes      PREVENTIVE SCREENINGS      Cardiovascular Screening:    General: Screening Not Indicated and History Lipid Disorder      Diabetes Screening:     General: Screening Current      Colorectal Cancer Screening:     General: Screening Current      Breast Cancer Screening:     General: Screening Current      Cervical Cancer Screening:    General: Screening Not Indicated      Osteoporosis Screening:    General: Screening Current      Abdominal Aortic Aneurysm (AAA) Screening:        General: Screening Not Indicated      Lung Cancer Screening:     General: Screening Not Indicated      Hepatitis C Screening:    General: Screening Current    Screening, Brief Intervention, and Referral to Treatment (SBIRT)    Screening  Typical number of drinks in a day: 0  Typical number of drinks in a week: 0  Interpretation: Low risk drinking behavior  Single Item Drug Screening:  How often have you used an illegal drug (including marijuana) or a prescription medication for non-medical reasons in the past year? never    Single Item Drug Screen Score: 0  Interpretation: Negative screen for possible drug use disorder    No results found  Physical Exam:     /80   Pulse 87   Temp 97 7 °F (36 5 °C)   Ht 5' 2 5" (1 588 m)   Wt 67 6 kg (149 lb)   SpO2 98%   BMI 26 82 kg/m²     Physical Exam  Vitals reviewed  Constitutional:       Appearance: Normal appearance  HENT:      Head: Normocephalic and atraumatic  Eyes:      Conjunctiva/sclera: Conjunctivae normal    Cardiovascular:      Rate and Rhythm: Normal rate and regular rhythm  Heart sounds: Normal heart sounds     Pulmonary:      Effort: Pulmonary effort is normal       Breath sounds: Normal breath sounds  Abdominal:      General: Bowel sounds are normal       Palpations: Abdomen is soft  Musculoskeletal:         General: Normal range of motion  Cervical back: Neck supple  Right lower leg: No edema  Left lower leg: No edema  Skin:     General: Skin is warm and dry  Neurological:      Mental Status: She is alert and oriented to person, place, and time  Sensory: Sensation is intact  Motor: No weakness  Psychiatric:         Mood and Affect: Mood normal          Behavior: Behavior normal           Reviewed recent labs and dexa scan with patient     [facility] Becca Lazaro

## 2023-03-15 NOTE — ASSESSMENT & PLAN NOTE
Discussed starting to wean off trileptal  She can take 1/2 tablet (75mg) twice daily for 1 month then decrease to 75 mg once daily for 1 month then stop unless symptoms reoccur

## 2023-03-15 NOTE — PROGRESS NOTES
Name: Chris Wylie      : 1950      MRN: [id number]  Encounter Provider: ELAYNE Prieto  Encounter Date: 3/15/2023   Encounter department: [facility] Lele Dominguez     {There are no diagnoses linked to this encounter  (Refresh or delete this SmartLink)}       [facility] is here today for follow up      Review of Systems    Current Outpatient Medications on File Prior to Visit   Medication Sig   • OXcarbazepine (TRILEPTAL) 150 mg tablet TAKE 1 TABLET BY MOUTH TWICE A DAY   • ammonium lactate (LAC-HYDRIN) 12 % lotion    • atorvastatin (LIPITOR) 20 mg tablet TAKE 1 TABLET BY MOUTH  DAILY   • b complex vitamins tablet Take 1 tablet by mouth daily   • Cholecalciferol (VITAMIN D) 2000 units CAPS Take 2,000 Units by mouth daily    • levothyroxine 88 mcg tablet TAKE 1 TABLET BY MOUTH  DAILY   • Multiple Vitamin (MULTIVITAMIN) tablet Take 1 tablet by mouth daily   • triamcinolone (KENALOG) 0 1 % cream        Objective     There were no vitals taken for this visit      Physical Exam     Reviewed recent labs with patient   Ese Walker

## 2023-03-29 ENCOUNTER — TELEPHONE (OUTPATIENT)
Dept: NEUROSURGERY | Facility: CLINIC | Age: 73
End: 2023-03-29

## 2023-03-29 DIAGNOSIS — D33.3 LEFT ACOUSTIC NEUROMA (HCC): Primary | ICD-10-CM

## 2023-03-29 NOTE — TELEPHONE ENCOUNTER
Spoke to patient and placed script for Mri brain wwo contrast with fiesta sequence  She may schedule out of network like she usually does and she is aware to get the disc and the report if she does that otherwise I also provided her with central scheduling's phone number in case she wants to schedule it here  She will give us a call once scheduled and we can determine if she needs follow up- most likely snpx with oselkin per [facility]'s message below if she is still experiencing trigem pain

## 2023-03-29 NOTE — TELEPHONE ENCOUNTER
"----- Message from Esther Dubose MD sent at 3/29/2023  3:52 PM EDT -----  Regarding: RE: next steps  Doesn't need to see me - perhaps a SNPLX with Corrie Gallegos?     ----- Message -----  From: Mathieu Daly RN  Sent: 3/29/2023   3:30 PM EDT  To: Esther Dubose MD  Subject: RE: next steps                                   Okay, great  After the MRI should I have her come back with an APC then or SNPX with you and then go from there whether or not she needs an appt with [facility] etc?  Thanks     ----- Message -----  From: Esther Dubose MD  Sent: 3/29/2023   2:18 PM EDT  To: Mathieu Daly RN  Subject: RE: next steps                                   Agree a new MRI (including FIESTA sequence) would be helpful  Otherwise, from my last note: \"We discussed briefly options for management ranging from medications, to glycerol rhizotomy/radiofrequency ablation, to radio surgery, to surgery  <Given> this patient's age, I certainly would not recommend surgery for this  I would strongly recommend aggressive medical therapy, perhaps with the assistance of Neurology if needed, before any interventions  More appropriate intervention, should she fail medical therapy, would be glycerol rhizotomy  \"    So if she needs help with medical therapy optimization - Neurology  If that fails, glycerol rhizotomy as an option, which would be Dr Corrie Gallegos  If that fails, perhaps SRS, which would be Radiation Oncology       Sound like a plan?         ----- Message -----  From: Mathieu Daly RN  Sent: 3/29/2023  11:08 AM EDT  To: Esther Dubose MD, Mathieu Daly RN  Subject: next steps                                       Patient called the nurse line with concerns about her acoustic neuroma and trigeminal neuralgia pain  When she saw you in May 2021, you had recommended she exhaust medical management for the trigeminal neuralgia pain and offered to put in a referral to neuro    It appears that her PCP was " treating this pain for her and she was taking trileptal which was helping  The family doctor and patient decided to wean from this medication to see if her symptoms would return/persist and patient stated that they came right back and were worse than before  She started taking the medication again but has been experiencing severe pain in front of her left ear and across her cheek  She also has concerns that there may be growth of her acoustic neuroma  She would like to know your thoughts on this  Ideally, she would like to have another MRI to assess the status of her acoustic neuroma  Please advise your recommendations  Thanks!

## 2023-03-29 NOTE — TELEPHONE ENCOUNTER
"----- Message from Romana Salk, MD sent at 3/29/2023  3:52 PM EDT -----  Regarding: RE: next steps  Doesn't need to see me - perhaps a SNPLX with Michael Adame?     ----- Message -----  From: Avtar Garcia RN  Sent: 3/29/2023   3:30 PM EDT  To: Romana Salk, MD  Subject: RE: next steps                                   Okay, great  After the MRI should I have her come back with an APC then or SNPX with you and then go from there whether or not she needs an appt with [facility] etc?  Thanks     ----- Message -----  From: Romana Salk, MD  Sent: 3/29/2023   2:18 PM EDT  To: Avtar Garcia RN  Subject: RE: next steps                                   Agree a new MRI (including FIESTA sequence) would be helpful  Otherwise, from my last note: \"We discussed briefly options for management ranging from medications, to glycerol rhizotomy/radiofrequency ablation, to radio surgery, to surgery  <Given> this patient's age, I certainly would not recommend surgery for this  I would strongly recommend aggressive medical therapy, perhaps with the assistance of Neurology if needed, before any interventions  More appropriate intervention, should she fail medical therapy, would be glycerol rhizotomy  \"    So if she needs help with medical therapy optimization - Neurology  If that fails, glycerol rhizotomy as an option, which would be Dr Michael Adame  If that fails, perhaps SRS, which would be Radiation Oncology       Sound like a plan?         ----- Message -----  From: Avtar Garcia RN  Sent: 3/29/2023  11:08 AM EDT  To: Romana Salk, MD, Avtar Garcia RN  Subject: next steps                                       Patient called the nurse line with concerns about her acoustic neuroma and trigeminal neuralgia pain  When she saw you in May 2021, you had recommended she exhaust medical management for the trigeminal neuralgia pain and offered to put in a referral to neuro    It appears that her PCP was " treating this pain for her and she was taking trileptal which was helping  The family doctor and patient decided to wean from this medication to see if her symptoms would return/persist and patient stated that they came right back and were worse than before  She started taking the medication again but has been experiencing severe pain in front of her left ear and across her cheek  She also has concerns that there may be growth of her acoustic neuroma  She would like to know your thoughts on this  Ideally, she would like to have another MRI to assess the status of her acoustic neuroma  Please advise your recommendations  Thanks!

## 2023-04-02 ENCOUNTER — HOSPITAL ENCOUNTER (OUTPATIENT)
Dept: RADIOLOGY | Facility: HOSPITAL | Age: 73
Discharge: HOME | End: 2023-04-02
Attending: NEUROLOGICAL SURGERY

## 2023-04-02 DIAGNOSIS — D33.3 LEFT ACOUSTIC NEUROMA (HCC): ICD-10-CM

## 2023-04-02 RX ADMIN — GADOBUTROL 6 ML: 604.72 INJECTION INTRAVENOUS at 14:42

## 2023-05-07 DIAGNOSIS — G50.0 TRIGEMINAL NEURALGIA OF LEFT SIDE OF FACE: ICD-10-CM

## 2023-05-07 RX ORDER — OXCARBAZEPINE 150 MG/1
TABLET, FILM COATED ORAL
Qty: 60 TABLET | Refills: 0 | Status: SHIPPED | OUTPATIENT
Start: 2023-05-07

## 2023-06-04 DIAGNOSIS — G50.0 TRIGEMINAL NEURALGIA OF LEFT SIDE OF FACE: ICD-10-CM

## 2023-06-04 RX ORDER — OXCARBAZEPINE 150 MG/1
TABLET, FILM COATED ORAL
Qty: 60 TABLET | Refills: 0 | Status: SHIPPED | OUTPATIENT
Start: 2023-06-04

## 2023-07-03 DIAGNOSIS — G50.0 TRIGEMINAL NEURALGIA OF LEFT SIDE OF FACE: ICD-10-CM

## 2023-07-03 RX ORDER — OXCARBAZEPINE 150 MG/1
TABLET, FILM COATED ORAL
Qty: 60 TABLET | Refills: 0 | Status: SHIPPED | OUTPATIENT
Start: 2023-07-03

## 2023-07-31 DIAGNOSIS — G50.0 TRIGEMINAL NEURALGIA OF LEFT SIDE OF FACE: ICD-10-CM

## 2023-07-31 RX ORDER — OXCARBAZEPINE 150 MG/1
TABLET, FILM COATED ORAL
Qty: 60 TABLET | Refills: 0 | Status: SHIPPED | OUTPATIENT
Start: 2023-07-31

## 2023-08-10 ENCOUNTER — TELEPHONE (OUTPATIENT)
Dept: INTERNAL MEDICINE CLINIC | Facility: CLINIC | Age: 73
End: 2023-08-10

## 2023-08-10 DIAGNOSIS — H81.13 BENIGN PAROXYSMAL POSITIONAL VERTIGO DUE TO BILATERAL VESTIBULAR DISORDER: Primary | ICD-10-CM

## 2023-08-10 NOTE — TELEPHONE ENCOUNTER
Referral placed for PT for vestibular therapy, please provide information      ----- Message from Roya Bunch sent at 8/10/2023  9:36 AM EDT -----  Regarding: FW: Vertigo  Contact: [phone]    ----- Message -----  From: Iraalangilda Mccartney  Sent: 8/10/2023   9:35 AM EDT  To: [facility]  Subject: Vertigo                                          I have taken my blood pressure on a hoe unit I have. I think it was 123/80 and now 130/76. I hope I put the cuff on okay. I believe I am hydrated. I would like to try the vestibular therapy. Please let me know what I need to do. I don't know if it is because of the acoustic neuroma, allergies (not taking anything right now - still had it when taking Allegra), medication (Oxcarbazepine), ear problems, etc.  I don't have the strength I use to have and the dizziness affects my walking. I know I should be exercising more but don't have the drive to do it. I am afraid to do much because it might set off the dizziness. Let's try therapy.

## 2023-08-22 ENCOUNTER — EVALUATION (OUTPATIENT)
Dept: PHYSICAL THERAPY | Age: 73
End: 2023-08-22
Payer: MEDICARE

## 2023-08-22 VITALS — SYSTOLIC BLOOD PRESSURE: 112 MMHG | DIASTOLIC BLOOD PRESSURE: 76 MMHG | HEART RATE: 93 BPM

## 2023-08-22 DIAGNOSIS — H81.13 BENIGN PAROXYSMAL POSITIONAL VERTIGO DUE TO BILATERAL VESTIBULAR DISORDER: ICD-10-CM

## 2023-08-22 DIAGNOSIS — R26.9 ABNORMALITY OF GAIT: Primary | ICD-10-CM

## 2023-08-22 DIAGNOSIS — R42 VERTIGO: ICD-10-CM

## 2023-08-22 PROCEDURE — 97162 PT EVAL MOD COMPLEX 30 MIN: CPT

## 2023-08-22 PROCEDURE — 97110 THERAPEUTIC EXERCISES: CPT

## 2023-08-22 NOTE — PROGRESS NOTES
PT Evaluation     Today's date: 2023  Patient name: Rocio Decker  : 1950  MRN: [id number]  Referring provider: ELAYNE Amor  Dx:   Encounter Diagnosis     ICD-10-CM    1. Abnormality of gait  R26.9       2. Benign paroxysmal positional vertigo due to bilateral vestibular disorder  H81.13 Ambulatory Referral to Physical Therapy      3. Vertigo  R42                      Assessment  Assessment details: Maile Wu is a 72year old female presenting with c/o vertigo, imbalance and unstable gait with recent onset of symptoms noted in early Aug of 2023 and hx of s/p left acoustic neuroma s/p radiation treatments in  with report of increasing size of neuroma noted currently. PT is warranted to address the above stated deficits in efforts to reduce c/o vertigo, improve vestibular habituation and return to all prior activities without fear of falling A left epley maneuver was performed x 2 today to address slight dizziness in dixhallpike testing no nystagmus seen with testing. Pt to report if any effectiveness in reducing off balance sensation in the future. Fakuda step testing eyes open pt with 6 inche drift to right aand 2 foot forward deviation, eyes closed 3 foot deviation forward and 90 degree turn to the right indicating significant vestibular dysfunction. Impairments: abnormal gait, abnormal or restricted ROM, activity intolerance, impaired balance, lacks appropriate home exercise program and poor posture   Other impairment: hx of left acoustic neuroma s/p radiation treatments in  with growth of neuroma noted recently , left ear hearing loss   Functional limitations: decreased bed mobility, imbalance, unstable gait,  decreased left sided vestibular function , c/o vertigo, possible left post BPPV canal involvement  Symptom irritability: moderateUnderstanding of Dx/Px/POC: good   Prognosis: good    Goals  Short Term  1.  The pt shall achieve vertigo reduction by 50 percent in two weeks. 2. The pt shall achieve independent bed mobility in two weeks. 3. The pt shall prove independent in a home exer program in two weeks. Long Term Goals Vestibular  1. The pt shall achieve 80 percent reduction in vertigo in 4 weeks   2. The pt shall achieve quick turns with gait with not loss of balance in 4 weeks    Plan  Patient would benefit from: PT eval and skilled physical therapy  Referral necessary: Yes  Planned therapy interventions: manual therapy, balance, neuromuscular re-education, patient education, postural training, canalith repositioning, strengthening, stretching, therapeutic activities, therapeutic exercise, gait training and home exercise program  Other planned therapy interventions: vestibular rehabilitation , habituation exer,   Frequency: 2x week  Duration in weeks: 8  Plan of Care beginning date: 8/22/2023  Plan of Care expiration date: 10/20/2023  Treatment plan discussed with: patient        Subjective Evaluation    History of Present Illness  Onset date: early August while rolling to right in bed vertigo c/o. Mechanism of injury: Krupa Lima is a 72year old female referred to outpt PT with a diagnosis of vertigo, BPPV, imbalance and gait abnormality with her most recent onset of symptoms noted in early Aug of 2023 while throwing her sheets to the right while rolling in bed f/b onset of vertigo which then lasted for several hours afterwards causing imbalance and unsteady gait. PMH is significant for s/p left acoustic neuroma s/p radiation treatments in 2007 with residual decreased left ear hearing. Recently she reports the neuroma has grown slightly per medical testing. She has had several episodes of vertigo since the Spring of last year while lying backwards or at the hair salon. ( see also PMH) She also presents with left sided trigeminal neuralgia treated with medication. Vertigo symptoms are worse with position changes .  She has not had any falls but does report periodic unsteadiness. She also feels weaker in her left le and thought her balance deficits may be due to leg/ankle weakness possibly bilaterally as her activity level has declined. She denies any neck pain at this time. Recurrent probem    Quality of life: good    Patient Goals  Patient goals for therapy: improved balance, independence with ADLs/IADLs, return to sport/leisure activities and increased motion  Patient goal: no loss of balance with quick turns with gait,  reduction of vertigo by 80 percent   Pain  Current pain ratin  At best pain ratin  At worst pain ratin    Social Support  Steps to enter house: yes (3 steps no railing)  Stairs in house: yes (13 steps right railing)   Lives in: multiple-level home  Lives with: spouse    Employment status: not working  Hand dominance: right  Exercise history: cuts grass, does yardwork, walks 3 x weeks for 1/2 hr at a time,    Life stress: once triggered dizziness could last the entire day per her report. Treatments  Current treatment: physical therapy        Objective     Active Range of Motion   Cervical/Thoracic Spine       Cervical  Subcranial protraction:  WFL   Subcranial retraction: Active cervical subcranial retraction: 3/4 range. Flexion:  WFL  Extension:  WFL  Left lateral flexion: Neck active lateral bend left: 3/4 range       Right lateral flexion: Neck active lateral bend right: 3/4 range. Left rotation: Neck active rotation left: 3/4 range   Right rotation: Neck active rotation right: 3/4 range. Additional Active Range of Motion Details  Slight forward head , decreased lumbar lordosis noted     Strength/Myotome Testing     Additional Strength Details  Cervical mmt 5/5 status.      Ambulation     Ambulation: Level Surfaces   Ambulation without assistive device: independent    Additional Level Surfaces Ambulation Details  250 ft no assistive device,  Tandem gait cg of 1 10 ft, tandem stance right foot in front of left 20 sec pt falls,  Left foot in front  25 sec,   Sharpened romberg pt falls in 5 seconds. Functional gait assessment 24/30,  TUG 9 seconds  , static standing eyes open wfl's, eyes closed slight ant post sway noted,  Foam standing eyes open slight ant post sway,  Eyes closed pt falls forward,    Foam standing vertical and horizontal head turns eyes closed pt falls,  fakuda step testing eyes open 2 foot forward and and 6 inch right drift ,  Eyes closed 3 feet forward and 90 degree turn to the right indicating significant vestibular dysfunction. Smooth pursuit, saccades convergence intact,    VOR can neg,  VOR slight vertigo,   HTT + on left    Supine lying slight vertigo noted,rolling right slight discomfort, left rolling neg,   Left sidelying to sit c/o vertigo, sit to sidelying neg,  Right sidelying to sit and sit to sidelying neg,  ranjan hallpike c/o vertigo with left downward positioning slight no nystagmus noted. Pt does report at times left sided ocular migraine ,   Once triggered dizziness can last for the whole day or several hours at a time per her report.   .      Flowsheet Rows    Flowsheet Row Most Recent Value   PT/OT G-Codes    Current Score 54   Projected Score 66   Assessment Type Evaluation   G code set Mobility: Walking & Moving Around   Mobility: Walking and Moving Around Current Status () CJ   Mobility: Walking and Moving Around Goal Status () CI      PMH: 2007 dx's left acoustic neuroma s/p radiation treatments now slowing growing per pt report, trigeminal neuralgia left jaw and left side of face at times shooting pains controlled with medication, decreased left ear hearing, hypothyroidism, essential HTN, BPPV , OA multijoint, incomplete uterovaginal prolapse, vit d defic, prediabetes , forgetfulness, 1/8/19 sling operation for continence, left ocular migraine, pt wears progressive lenses       Precautions: allergies: erythromycin, carbamazepine    Hx of left acoustic neuroma s/p radiation treatments 2007 (now increased size noted slight)  Manuals 8/22/23             I eval             Left epley  X 2            Retest ranjan hallpike left             biodex balance testing and training             Neuro Re-Ed             Tandem gait 1 min x 1            Tandem stance 30 sec x 3            Ball pass activities flex/ext, rot, ccw cw circles             Walk and toss ball to self and side to side with staff             Hallway r.l ball toss and sidestepping toss in front             Squats               cones r and left              Ther Ex             Obstacle course in gym              Step over hurdles fwd, lateral                                                                                           Ther Activity                                       Gait Training                                       Modalities

## 2023-08-24 ENCOUNTER — OFFICE VISIT (OUTPATIENT)
Dept: PHYSICAL THERAPY | Age: 73
End: 2023-08-24
Payer: MEDICARE

## 2023-08-24 DIAGNOSIS — H81.13 BENIGN PAROXYSMAL POSITIONAL VERTIGO DUE TO BILATERAL VESTIBULAR DISORDER: Primary | ICD-10-CM

## 2023-08-24 PROCEDURE — 97110 THERAPEUTIC EXERCISES: CPT

## 2023-08-24 NOTE — PROGRESS NOTES
Daily Note     Today's date: 2023  Patient name: Carrie Pinon  : 1950  MRN: [id number]  Referring provider: ELAYNE Jenkins  Dx:   Encounter Diagnosis     ICD-10-CM    1. Benign paroxysmal positional vertigo due to bilateral vestibular disorder  H81.13                      Subjective: Without loss of balance with all challenges. Bio dex testing today. Objective: See treatment diary below      Assessment: Tolerated treatment well. Patient would benefit from continued PT      Plan: Continue per plan of care.       Precautions: allergies: erythromycin, carbamazepine    Hx of left acoustic neuroma s/p radiation treatments  (now increased size noted slight)  Manuals 23            I eval             Left epley  X 2            Retest ranjan hallpike left             biodex balance testing and training  performed           Neuro Re-Ed             Tandem gait 1 min x 1 2 min           Tandem stance 30 sec x 3 20sec x 5           Ball pass activities flex/ext, rot, ccw cw circles  40ft x 4           Walk and toss ball to self and side to side with staff  40ft x 4           Hallway r.l ball toss and sidestepping toss in front  x4           Squats   3 x 10            cones r and left              Ther Ex             Obstacle course in gym              Step over hurdles fwd, lateral  5x           T-mill eye/head  10 min                                                                            Ther Activity                                       Gait Training                                       Modalities

## 2023-08-28 DIAGNOSIS — E78.00 HYPERCHOLESTEROLEMIA: ICD-10-CM

## 2023-08-28 DIAGNOSIS — E03.9 HYPOTHYROIDISM, UNSPECIFIED TYPE: ICD-10-CM

## 2023-08-28 DIAGNOSIS — G50.0 TRIGEMINAL NEURALGIA OF LEFT SIDE OF FACE: ICD-10-CM

## 2023-08-28 RX ORDER — OXCARBAZEPINE 150 MG/1
TABLET, FILM COATED ORAL
Qty: 60 TABLET | Refills: 0 | Status: SHIPPED | OUTPATIENT
Start: 2023-08-28

## 2023-08-28 RX ORDER — ATORVASTATIN CALCIUM 20 MG/1
20 TABLET, FILM COATED ORAL DAILY
Qty: 90 TABLET | Refills: 1 | Status: SHIPPED | OUTPATIENT
Start: 2023-08-28

## 2023-08-28 RX ORDER — LEVOTHYROXINE SODIUM 88 UG/1
88 TABLET ORAL DAILY
Qty: 90 TABLET | Refills: 1 | Status: SHIPPED | OUTPATIENT
Start: 2023-08-28

## 2023-08-29 ENCOUNTER — OFFICE VISIT (OUTPATIENT)
Dept: PHYSICAL THERAPY | Age: 73
End: 2023-08-29
Payer: MEDICARE

## 2023-08-29 DIAGNOSIS — H81.13 BENIGN PAROXYSMAL POSITIONAL VERTIGO DUE TO BILATERAL VESTIBULAR DISORDER: ICD-10-CM

## 2023-08-29 DIAGNOSIS — R26.9 ABNORMALITY OF GAIT: Primary | ICD-10-CM

## 2023-08-29 PROCEDURE — 97530 THERAPEUTIC ACTIVITIES: CPT

## 2023-08-29 PROCEDURE — 97110 THERAPEUTIC EXERCISES: CPT

## 2023-08-29 PROCEDURE — 97112 NEUROMUSCULAR REEDUCATION: CPT

## 2023-08-29 NOTE — PROGRESS NOTES
Daily Note     Today's date: 2023  Patient name: Jordan Canas  : 1950  MRN: [id number]  Referring provider: ELAYNE Esparza  Dx:   Encounter Diagnosis     ICD-10-CM    1. Abnormality of gait  R26.9       2. Benign paroxysmal positional vertigo due to bilateral vestibular disorder  H81.13                      Subjective: "I am at least 50 percent better."      Objective: See treatment diary below      Assessment: Tolerated treatment well. Patient would benefit from continued PT      Plan: Continue per plan of care.       Precautions: allergies: erythromycin, carbamazepine    Hx of left acoustic neuroma s/p radiation treatments  (now increased size noted slight)  Manuals 23           I eval             Left epley  X 2            Retest ranjan hallpike left             biodex balance testing and training  performed           Neuro Re-Ed             Tandem gait 1 min x 1 2 min 1 min x 1          Tandem stance 30 sec x 3 20sec x 5 30 sec x 3          Ball pass activities flex/ext, rot, ccw cw circles  40ft x 4 10 reps each          Walk and toss ball to self and side to side with staff  40ft x 4  40 ft x 2          Hallway r.l ball toss and sidestepping toss in front  x4 100 ft x 2          Squats look thru blinds  3 x 10 3 x 10            cones r and left              Ther Ex             Obstacle course in gym              Step over hurdles fwd, lateral  5x           T-mill eye/head  10 min 10 sets of 10           Ankle sway referencing flat and foam eo, ec   10 reps each                                                              Ther Activity                                       Gait Training                                       Modalities

## 2023-08-31 ENCOUNTER — OFFICE VISIT (OUTPATIENT)
Dept: PHYSICAL THERAPY | Age: 73
End: 2023-08-31
Payer: MEDICARE

## 2023-08-31 DIAGNOSIS — R26.9 ABNORMALITY OF GAIT: Primary | ICD-10-CM

## 2023-08-31 DIAGNOSIS — H81.13 BENIGN PAROXYSMAL POSITIONAL VERTIGO DUE TO BILATERAL VESTIBULAR DISORDER: ICD-10-CM

## 2023-08-31 DIAGNOSIS — R42 VERTIGO: ICD-10-CM

## 2023-08-31 PROCEDURE — 97112 NEUROMUSCULAR REEDUCATION: CPT

## 2023-08-31 PROCEDURE — 97530 THERAPEUTIC ACTIVITIES: CPT

## 2023-08-31 PROCEDURE — 97110 THERAPEUTIC EXERCISES: CPT

## 2023-08-31 NOTE — PROGRESS NOTES
Daily Note     Today's date: 2023  Patient name: Dea Cordoba  : 1950  MRN: [id number]  Referring provider: ELAYNE Bowman  Dx:   Encounter Diagnosis     ICD-10-CM    1. Abnormality of gait  R26.9       2. Vertigo  R42       3. Benign paroxysmal positional vertigo due to bilateral vestibular disorder  H81.13                      Subjective: 'I am feeling better. Objective: See treatment diary below      Assessment: Tolerated treatment well. Patient would benefit from continued PT      Plan: Continue per plan of care.       Precautions: allergies: erythromycin, carbamazepine    Hx of left acoustic neuroma s/p radiation treatments  (now increased size noted slight)  Manuals 23          I eval    Man instruction with pt in self epley issued handout         Left epley  X 2   Left epley x 2         Retest ranjan hallpike left    perf          biodex balance testing and training  performed           Neuro Re-Ed             Tandem gait 1 min x 1 2 min 1 min x 1 1 min x 1         Tandem stance 30 sec x 3 20sec x 5 30 sec x 3 30 sec x 3         Ball pass activities flex/ext, rot, ccw cw circles  40ft x 4 10 reps each 10 reps          Walk and toss ball to self and side to side with staff  40ft x 4  40 ft x 2 40 ft x 2         Hallway r.l ball toss and sidestepping toss in front  x4 100 ft x 2 100 ft x 2         Squats look thru blinds  3 x 10 3 x 10  3 x 10          cones r and left              Ther Ex             Obstacle course in gym              Step over hurdles fwd, lateral  5x           T-mill eye/head  10 min 10 sets of 10  10 sets of 10          Ankle sway referencing flat and foam eo, ec   10 reps each          Proper rolling technique with one knee bent push with leg and roll head initiates movement    cueing to turn head first not leave it last with rolling         Chin tucks     5 reps x 3             encourage lumbar support with sitting Ther Activity                                       Gait Training                                       Modalities

## 2023-09-06 ENCOUNTER — OFFICE VISIT (OUTPATIENT)
Dept: PHYSICAL THERAPY | Age: 73
End: 2023-09-06
Payer: MEDICARE

## 2023-09-06 DIAGNOSIS — R42 VERTIGO: Primary | ICD-10-CM

## 2023-09-06 PROCEDURE — 97110 THERAPEUTIC EXERCISES: CPT

## 2023-09-06 NOTE — PROGRESS NOTES
Daily Note     Today's date: 2023  Patient name: Cristobal Mariano  : 1950  MRN: [id number]  Referring provider: ELAYNE Swain  Dx:   Encounter Diagnosis     ICD-10-CM    1. Vertigo  R42                      Subjective: Pt. Denies dizziness with all activities. Objective: See treatment diary below      Assessment: Tolerated treatment well. Patient would benefit from continued PT      Plan: Continue per plan of care.       Precautions: allergies: erythromycin, carbamazepine    Hx of left acoustic neuroma s/p radiation treatments  (now increased size noted slight)  Manuals 23         I eval    Man instruction with pt in self epley issued handout         Left epley  X 2   Left epley x 2         Retest ranjan hallpike left    perf          biodex balance testing and training  performed           Neuro Re-Ed             Tandem gait 1 min x 1 2 min 1 min x 1 1 min x 1 1 min        Tandem stance 30 sec x 3 20sec x 5 30 sec x 3 30 sec x 3 20sec x 5        Ball pass activities flex/ext, rot, ccw cw circles  40ft x 4 10 reps each 10 reps  10x        Walk and toss ball to self and side to side with staff  40ft x 4  40 ft x 2 40 ft x 2 40ft x 4        Hallway r.l ball toss and sidestepping toss in front  x4 100 ft x 2 100 ft x 2 100ft x 4        Squats look thru blinds  3 x 10 3 x 10  3 x 10 3 x 10         cones r and left              Ther Ex             Obstacle course in gym              Step over hurdles fwd, lateral  5x           T-mill eye/head  10 min 10 sets of 10  10 sets of 10  10 min        Ankle sway referencing flat and foam eo, ec   10 reps each          Proper rolling technique with one knee bent push with leg and roll head initiates movement    cueing to turn head first not leave it last with rolling         Chin tucks     5 reps x 3 2 x 10            encourage lumbar support with sitting                      Ther Activity Pt shift assessment completed in day area. Pt pleasant, calm, cooperative. Pt appears brightened and states he is feeling much better today. Pt states that talking with other pt's who are going through similar issues is helping him to cope. Pt denies feeling depressed or anxious. Pt denies current SI, HI, AVH. Pt reports he slept well and is eating well. Pt states he is willing to comply with medications to treat his depression. Gait Training                                       Modalities

## 2023-09-07 ENCOUNTER — RA CDI HCC (OUTPATIENT)
Dept: OTHER | Facility: HOSPITAL | Age: 73
End: 2023-09-07

## 2023-09-08 ENCOUNTER — APPOINTMENT (OUTPATIENT)
Dept: PHYSICAL THERAPY | Age: 73
End: 2023-09-08
Payer: MEDICARE

## 2023-09-08 LAB
ALBUMIN SERPL-MCNC: 4 G/DL (ref 3.6–5.1)
ALBUMIN/GLOB SERPL: 1.5 (CALC) (ref 1–2.5)
ALP SERPL-CCNC: 74 U/L (ref 37–153)
ALT SERPL-CCNC: 15 U/L (ref 6–29)
AST SERPL-CCNC: 20 U/L (ref 10–35)
BILIRUB SERPL-MCNC: 0.6 MG/DL (ref 0.2–1.2)
BUN SERPL-MCNC: 13 MG/DL (ref 7–25)
BUN/CREAT SERPL: NORMAL (CALC) (ref 6–22)
CALCIUM SERPL-MCNC: 9.7 MG/DL (ref 8.6–10.4)
CHLORIDE SERPL-SCNC: 103 MMOL/L (ref 98–110)
CHOLEST SERPL-MCNC: 172 MG/DL
CHOLEST/HDLC SERPL: 4.1 (CALC)
CO2 SERPL-SCNC: 32 MMOL/L (ref 20–32)
CREAT SERPL-MCNC: 0.88 MG/DL (ref 0.6–1)
GFR/BSA.PRED SERPLBLD CYS-BASED-ARV: 70 ML/MIN/1.73M2
GLOBULIN SER CALC-MCNC: 2.7 G/DL (CALC) (ref 1.9–3.7)
GLUCOSE SERPL-MCNC: 98 MG/DL (ref 65–99)
HBA1C MFR BLD: 5.7 % OF TOTAL HGB
HDLC SERPL-MCNC: 42 MG/DL
LDLC SERPL CALC-MCNC: 103 MG/DL (CALC)
NONHDLC SERPL-MCNC: 130 MG/DL (CALC)
POTASSIUM SERPL-SCNC: 4.2 MMOL/L (ref 3.5–5.3)
PROT SERPL-MCNC: 6.7 G/DL (ref 6.1–8.1)
SODIUM SERPL-SCNC: 142 MMOL/L (ref 135–146)
TRIGL SERPL-MCNC: 155 MG/DL
TSH SERPL-ACNC: 3.68 MIU/L (ref 0.4–4.5)

## 2023-09-12 ENCOUNTER — APPOINTMENT (OUTPATIENT)
Dept: PHYSICAL THERAPY | Age: 73
End: 2023-09-12
Payer: MEDICARE

## 2023-09-13 ENCOUNTER — OFFICE VISIT (OUTPATIENT)
Dept: INTERNAL MEDICINE CLINIC | Facility: CLINIC | Age: 73
End: 2023-09-13
Payer: MEDICARE

## 2023-09-13 VITALS
HEIGHT: 63 IN | BODY MASS INDEX: 25.69 KG/M2 | OXYGEN SATURATION: 97 % | DIASTOLIC BLOOD PRESSURE: 70 MMHG | SYSTOLIC BLOOD PRESSURE: 120 MMHG | TEMPERATURE: 96.6 F | HEART RATE: 90 BPM | WEIGHT: 145 LBS

## 2023-09-13 DIAGNOSIS — E78.00 HYPERCHOLESTEROLEMIA: ICD-10-CM

## 2023-09-13 DIAGNOSIS — D33.3 LEFT ACOUSTIC NEUROMA (HCC): ICD-10-CM

## 2023-09-13 DIAGNOSIS — R73.03 PREDIABETES: ICD-10-CM

## 2023-09-13 DIAGNOSIS — E03.9 HYPOTHYROIDISM, UNSPECIFIED TYPE: Primary | ICD-10-CM

## 2023-09-13 DIAGNOSIS — G50.0 TRIGEMINAL NEURALGIA OF LEFT SIDE OF FACE: ICD-10-CM

## 2023-09-13 DIAGNOSIS — R01.1 MURMUR: ICD-10-CM

## 2023-09-13 DIAGNOSIS — I10 ESSENTIAL HYPERTENSION: ICD-10-CM

## 2023-09-13 PROCEDURE — 99214 OFFICE O/P EST MOD 30 MIN: CPT | Performed by: NURSE PRACTITIONER

## 2023-09-13 NOTE — PROGRESS NOTES
Name: Rakan Ambriz      : 1950      MRN: [id number]  Encounter Provider: ELAYNE Jones  Encounter Date: 2023   Encounter department: [address] [facility]     1. Hypothyroidism, unspecified type  Assessment & Plan:  Adequately replaced      2. Essential hypertension  Assessment & Plan:  Stable  Not on medication    Orders:  -     Comprehensive metabolic panel; Future    3. Left acoustic neuroma [facility])  Assessment & Plan:  Stable per recent MRI      4. Trigeminal neuralgia of left side of face  Assessment & Plan: On trileptal, not able to wean off.       5. Hypercholesterolemia  Assessment & Plan:  Continue statin. Reduce carbohydrates. Orders:  -     Lipid Panel with Direct LDL reflex; Future    6. Prediabetes  Assessment & Plan:  A1C 5.7%    Orders:  -     Hemoglobin A1C; Future  -     CBC and differential; Future    7. Murmur  -     Echo complete w/ contrast if indicated; Future; Expected date: 2023         Latoya Felder is here today for follow up  She is doing ok  She completed vestibular therapy recently due to increased episodes of vertigo. She has noted significant improvement. She knows what to do if it reoccurs at home. She still gets an occasional left facial pain but rarely. She denies chest pain or shortness of breath. No LE swelling. Review of Systems   Constitutional: Negative for activity change, appetite change, fatigue and unexpected weight change. Eyes: Negative for visual disturbance. Respiratory: Negative for cough and shortness of breath. Cardiovascular: Negative for chest pain, palpitations and leg swelling. Gastrointestinal: Negative for abdominal pain, blood in stool, constipation and diarrhea. Genitourinary: Negative for difficulty urinating. Musculoskeletal: Negative for arthralgias. Skin: Negative for rash.    Neurological: Negative for dizziness, light-headedness and headaches. Psychiatric/Behavioral: Negative for sleep disturbance. Current Outpatient Medications on File Prior to Visit   Medication Sig   • atorvastatin (LIPITOR) 20 mg tablet TAKE 1 TABLET BY MOUTH DAILY   • b complex vitamins tablet Take 1 tablet by mouth daily   • Cholecalciferol (VITAMIN D) 2000 units CAPS Take 2,000 Units by mouth daily    • levothyroxine 88 mcg tablet TAKE 1 TABLET BY MOUTH DAILY   • Multiple Vitamin (MULTIVITAMIN) tablet Take 1 tablet by mouth daily   • OXcarbazepine (TRILEPTAL) 150 mg tablet TAKE 1 TABLET BY MOUTH TWICE A DAY   • [DISCONTINUED] triamcinolone (KENALOG) 0.1 % cream        Objective     /70   Pulse 90   Temp (!) 96.6 °F (35.9 °C)   Ht 5' 2.5" (1.588 m)   Wt 65.8 kg (145 lb)   SpO2 97%   BMI 26.10 kg/m²     Physical Exam  Vitals reviewed. Constitutional:       Appearance: Normal appearance. HENT:      Head: Normocephalic and atraumatic. Eyes:      Conjunctiva/sclera: Conjunctivae normal.   Cardiovascular:      Rate and Rhythm: Normal rate and regular rhythm. Heart sounds: Murmur heard. Pulmonary:      Effort: Pulmonary effort is normal.      Breath sounds: Normal breath sounds. Abdominal:      General: Bowel sounds are normal.      Palpations: Abdomen is soft. Musculoskeletal:         General: Normal range of motion. Cervical back: Neck supple. Right lower leg: No edema. Left lower leg: No edema. Skin:     General: Skin is warm and dry. Neurological:      Mental Status: She is alert and oriented to person, place, and time.    Psychiatric:         Mood and Affect: Mood normal.         Behavior: Behavior normal.       ELAYNE Mancilla

## 2023-09-14 ENCOUNTER — APPOINTMENT (OUTPATIENT)
Dept: PHYSICAL THERAPY | Age: 73
End: 2023-09-14
Payer: MEDICARE

## 2023-09-14 ENCOUNTER — HOSPITAL ENCOUNTER (OUTPATIENT)
Dept: NON INVASIVE DIAGNOSTICS | Facility: CLINIC | Age: 73
Discharge: HOME | End: 2023-09-14
Payer: MEDICARE

## 2023-09-14 DIAGNOSIS — G45.3 AMAUROSIS FUGAX: ICD-10-CM

## 2023-09-14 PROCEDURE — 93880 EXTRACRANIAL BILAT STUDY: CPT

## 2023-09-14 PROCEDURE — 93880 EXTRACRANIAL BILAT STUDY: CPT | Performed by: SURGERY

## 2023-09-28 DIAGNOSIS — G50.0 TRIGEMINAL NEURALGIA OF LEFT SIDE OF FACE: ICD-10-CM

## 2023-09-28 RX ORDER — OXCARBAZEPINE 150 MG/1
TABLET, FILM COATED ORAL
Qty: 60 TABLET | Refills: 0 | Status: SHIPPED | OUTPATIENT
Start: 2023-09-28

## 2023-10-29 DIAGNOSIS — G50.0 TRIGEMINAL NEURALGIA OF LEFT SIDE OF FACE: ICD-10-CM

## 2023-10-30 RX ORDER — OXCARBAZEPINE 150 MG/1
TABLET, FILM COATED ORAL
Qty: 60 TABLET | Refills: 2 | Status: SHIPPED | OUTPATIENT
Start: 2023-10-30

## 2024-01-04 ENCOUNTER — OFFICE VISIT (OUTPATIENT)
Dept: INTERNAL MEDICINE CLINIC | Facility: CLINIC | Age: 74
End: 2024-01-04
Payer: MEDICARE

## 2024-01-04 VITALS
SYSTOLIC BLOOD PRESSURE: 150 MMHG | TEMPERATURE: 98.5 F | OXYGEN SATURATION: 95 % | DIASTOLIC BLOOD PRESSURE: 88 MMHG | HEIGHT: 63 IN | WEIGHT: 146.6 LBS | HEART RATE: 92 BPM | BODY MASS INDEX: 25.98 KG/M2

## 2024-01-04 DIAGNOSIS — M54.50 ACUTE LEFT-SIDED LOW BACK PAIN WITHOUT SCIATICA: ICD-10-CM

## 2024-01-04 DIAGNOSIS — B34.9 VIRAL ILLNESS: Primary | ICD-10-CM

## 2024-01-04 PROCEDURE — 99213 OFFICE O/P EST LOW 20 MIN: CPT | Performed by: NURSE PRACTITIONER

## 2024-01-04 NOTE — PROGRESS NOTES
Name: Krupa Baxter      : 1950      MRN: [id number]  Encounter Provider: ELAYNE Dominguez  Encounter Date: 2024   Encounter department: [facility] INTERNAL MEDICINE    Assessment & Plan     1. Viral illness  Comments:  start saline nasal spray daily in am and flonase 1 spray in each nostril in pm.  ok to take robitussin as needed for cough    2. Acute left-sided low back pain without sciatica  Comments:  symptoms have improved with advil. recommend light stretching and walking daily.           Subjective      Krupa is here today with complaints of a cough  Symptoms started about 5 days ago  Tested negative for covid on 1/3   Cough is dry, feels a rattle on the left upper chest. No wheezing or shortness of breath.  Mild sore throat and runny nose  No fevers   Not taking anything OTC     She developed left lower back pain a few weeks ago. No known injury. The pain radiates to her groin. No numbness or tingling. She is taking advil with relief.         Review of Systems   Constitutional:  Negative for activity change, appetite change, fatigue and fever.   HENT:  Positive for rhinorrhea and sore throat. Negative for congestion.    Respiratory:  Positive for cough. Negative for chest tightness, shortness of breath and wheezing.    Cardiovascular:  Negative for chest pain.   Gastrointestinal:  Negative for abdominal pain.   Musculoskeletal:  Positive for arthralgias and back pain.   Neurological:  Negative for dizziness, weakness, light-headedness, numbness and headaches.       Current Outpatient Medications on File Prior to Visit   Medication Sig    atorvastatin (LIPITOR) 20 mg tablet TAKE 1 TABLET BY MOUTH DAILY    b complex vitamins tablet Take 1 tablet by mouth daily    Cholecalciferol (VITAMIN D) 2000 units CAPS Take 2,000 Units by mouth daily     levothyroxine 88 mcg tablet TAKE 1 TABLET BY MOUTH DAILY    Multiple Vitamin (MULTIVITAMIN) tablet Take 1 tablet by mouth daily     "OXcarbazepine (TRILEPTAL) 150 mg tablet TAKE 1 TABLET BY MOUTH TWICE A DAY       Objective     /88   Pulse 92   Temp 98.5 °F (36.9 °C)   Ht 5' 2.5\" (1.588 m)   Wt 66.5 kg (146 lb 9.6 oz)   SpO2 95%   BMI 26.39 kg/m²     Physical Exam  Vitals reviewed.   Constitutional:       Appearance: Normal appearance.   HENT:      Head: Normocephalic and atraumatic.      Right Ear: Tympanic membrane, ear canal and external ear normal.      Left Ear: Tympanic membrane, ear canal and external ear normal.      Nose: Rhinorrhea present.      Mouth/Throat:      Pharynx: No posterior oropharyngeal erythema.   Eyes:      Conjunctiva/sclera: Conjunctivae normal.   Cardiovascular:      Rate and Rhythm: Normal rate and regular rhythm.      Heart sounds: Normal heart sounds.   Pulmonary:      Effort: Pulmonary effort is normal.      Breath sounds: Normal breath sounds.   Musculoskeletal:         General: Normal range of motion.      Lumbar back: Tenderness present.        Back:    Neurological:      Mental Status: She is alert and oriented to person, place, and time.   Psychiatric:         Mood and Affect: Mood normal.         Behavior: Behavior normal.       ELAYNE Dominguez    "

## 2024-01-29 DIAGNOSIS — G50.0 TRIGEMINAL NEURALGIA OF LEFT SIDE OF FACE: ICD-10-CM

## 2024-01-29 RX ORDER — OXCARBAZEPINE 150 MG/1
TABLET, FILM COATED ORAL
Qty: 60 TABLET | Refills: 5 | Status: SHIPPED | OUTPATIENT
Start: 2024-01-29

## 2024-02-20 DIAGNOSIS — E78.00 HYPERCHOLESTEROLEMIA: ICD-10-CM

## 2024-02-20 DIAGNOSIS — E03.9 HYPOTHYROIDISM, UNSPECIFIED TYPE: ICD-10-CM

## 2024-02-20 RX ORDER — LEVOTHYROXINE SODIUM 88 UG/1
88 TABLET ORAL DAILY
Qty: 90 TABLET | Refills: 1 | Status: SHIPPED | OUTPATIENT
Start: 2024-02-20

## 2024-02-20 RX ORDER — ATORVASTATIN CALCIUM 20 MG/1
20 TABLET, FILM COATED ORAL DAILY
Qty: 90 TABLET | Refills: 1 | Status: SHIPPED | OUTPATIENT
Start: 2024-02-20

## 2024-03-12 ENCOUNTER — RA CDI HCC (OUTPATIENT)
Dept: OTHER | Facility: HOSPITAL | Age: 74
End: 2024-03-12

## 2024-03-15 LAB
ALBUMIN SERPL-MCNC: 4 G/DL (ref 3.6–5.1)
ALBUMIN/GLOB SERPL: 1.7 (CALC) (ref 1–2.5)
ALP SERPL-CCNC: 69 U/L (ref 37–153)
ALT SERPL-CCNC: 17 U/L (ref 6–29)
AST SERPL-CCNC: 23 U/L (ref 10–35)
BASOPHILS # BLD AUTO: 70 CELLS/UL (ref 0–200)
BASOPHILS NFR BLD AUTO: 1 %
BILIRUB SERPL-MCNC: 0.6 MG/DL (ref 0.2–1.2)
BUN SERPL-MCNC: 16 MG/DL (ref 7–25)
BUN/CREAT SERPL: ABNORMAL (CALC) (ref 6–22)
CALCIUM SERPL-MCNC: 9.2 MG/DL (ref 8.6–10.4)
CHLORIDE SERPL-SCNC: 104 MMOL/L (ref 98–110)
CHOLEST SERPL-MCNC: 168 MG/DL
CHOLEST/HDLC SERPL: 4 (CALC)
CO2 SERPL-SCNC: 31 MMOL/L (ref 20–32)
CREAT SERPL-MCNC: 0.75 MG/DL (ref 0.6–1)
EOSINOPHIL # BLD AUTO: 119 CELLS/UL (ref 15–500)
EOSINOPHIL NFR BLD AUTO: 1.7 %
ERYTHROCYTE [DISTWIDTH] IN BLOOD BY AUTOMATED COUNT: 12.7 % (ref 11–15)
GFR/BSA.PRED SERPLBLD CYS-BASED-ARV: 85 ML/MIN/1.73M2
GLOBULIN SER CALC-MCNC: 2.4 G/DL (CALC) (ref 1.9–3.7)
GLUCOSE SERPL-MCNC: 106 MG/DL (ref 65–99)
HBA1C MFR BLD: 5.8 % OF TOTAL HGB
HCT VFR BLD AUTO: 38.4 % (ref 35–45)
HDLC SERPL-MCNC: 42 MG/DL
HGB BLD-MCNC: 13.1 G/DL (ref 11.7–15.5)
LDLC SERPL CALC-MCNC: 105 MG/DL (CALC)
LYMPHOCYTES # BLD AUTO: 2352 CELLS/UL (ref 850–3900)
LYMPHOCYTES NFR BLD AUTO: 33.6 %
MCH RBC QN AUTO: 31.6 PG (ref 27–33)
MCHC RBC AUTO-ENTMCNC: 34.1 G/DL (ref 32–36)
MCV RBC AUTO: 92.8 FL (ref 80–100)
MONOCYTES # BLD AUTO: 749 CELLS/UL (ref 200–950)
MONOCYTES NFR BLD AUTO: 10.7 %
NEUTROPHILS # BLD AUTO: 3710 CELLS/UL (ref 1500–7800)
NEUTROPHILS NFR BLD AUTO: 53 %
NONHDLC SERPL-MCNC: 126 MG/DL (CALC)
PLATELET # BLD AUTO: 269 THOUSAND/UL (ref 140–400)
PMV BLD REES-ECKER: 10.6 FL (ref 7.5–12.5)
POTASSIUM SERPL-SCNC: 3.8 MMOL/L (ref 3.5–5.3)
PROT SERPL-MCNC: 6.4 G/DL (ref 6.1–8.1)
RBC # BLD AUTO: 4.14 MILLION/UL (ref 3.8–5.1)
SODIUM SERPL-SCNC: 143 MMOL/L (ref 135–146)
TRIGL SERPL-MCNC: 117 MG/DL
WBC # BLD AUTO: 7 THOUSAND/UL (ref 3.8–10.8)

## 2024-03-18 ENCOUNTER — OFFICE VISIT (OUTPATIENT)
Dept: INTERNAL MEDICINE CLINIC | Facility: CLINIC | Age: 74
End: 2024-03-18
Payer: MEDICARE

## 2024-03-18 VITALS
OXYGEN SATURATION: 99 % | TEMPERATURE: 96.8 F | WEIGHT: 146 LBS | HEART RATE: 96 BPM | DIASTOLIC BLOOD PRESSURE: 76 MMHG | BODY MASS INDEX: 25.87 KG/M2 | SYSTOLIC BLOOD PRESSURE: 114 MMHG | HEIGHT: 63 IN

## 2024-03-18 DIAGNOSIS — Z12.31 SCREENING MAMMOGRAM FOR BREAST CANCER: ICD-10-CM

## 2024-03-18 DIAGNOSIS — G50.0 TRIGEMINAL NEURALGIA OF LEFT SIDE OF FACE: ICD-10-CM

## 2024-03-18 DIAGNOSIS — E78.00 HYPERCHOLESTEROLEMIA: ICD-10-CM

## 2024-03-18 DIAGNOSIS — E03.9 HYPOTHYROIDISM, UNSPECIFIED TYPE: ICD-10-CM

## 2024-03-18 DIAGNOSIS — H81.10 BENIGN PAROXYSMAL POSITIONAL VERTIGO, UNSPECIFIED LATERALITY: ICD-10-CM

## 2024-03-18 DIAGNOSIS — Z00.00 MEDICARE ANNUAL WELLNESS VISIT, SUBSEQUENT: Primary | ICD-10-CM

## 2024-03-18 DIAGNOSIS — R73.03 PREDIABETES: ICD-10-CM

## 2024-03-18 DIAGNOSIS — D33.3 LEFT ACOUSTIC NEUROMA (HCC): ICD-10-CM

## 2024-03-18 DIAGNOSIS — I10 ESSENTIAL HYPERTENSION: ICD-10-CM

## 2024-03-18 PROCEDURE — G0439 PPPS, SUBSEQ VISIT: HCPCS | Performed by: NURSE PRACTITIONER

## 2024-03-18 PROCEDURE — 99214 OFFICE O/P EST MOD 30 MIN: CPT | Performed by: NURSE PRACTITIONER

## 2024-03-18 NOTE — PROGRESS NOTES
Assessment and Plan:     Problem List Items Addressed This Visit       Essential hypertension     Stable  Off medication         Hypercholesterolemia     On a statin.          Relevant Orders    Lipid Panel with Direct LDL reflex    Hypothyroidism     On levothyroxine         Relevant Orders    TSH, 3rd generation with Free T4 reflex    Prediabetes     A1C 5.8%         Relevant Orders    Comprehensive metabolic panel    Hemoglobin A1C    Left acoustic neuroma (HCC)     Stable.          Benign paroxysmal positional vertigo     Continue epley maneuver when needed.          Trigeminal neuralgia of left side of face     On trileptal.           Other Visit Diagnoses       Medicare annual wellness visit, subsequent    -  Primary    Screening mammogram for breast cancer        Relevant Orders    Mammo screening bilateral w 3d & cad             Preventive health issues were discussed with patient, and age appropriate screening tests were ordered as noted in patient's After Visit Summary.  Personalized health advice and appropriate referrals for health education or preventive services given if needed, as noted in patient's After Visit Summary.     History of Present Illness:     Patient presents for a Medicare Wellness Visit    Krupa is here today for follow up  She is doing well.  She has been walking on and off, not consistently.     She still gets a slight vertigo feeling when she moves her head too fast. She went to vestibular therapy, found the epley maneuver helpful.              Patient Care Team:  ELAYNE Dominguez as PCP - General (Internal Medicine)     Review of Systems:     Review of Systems   Constitutional:  Negative for activity change, appetite change, fatigue and unexpected weight change.   Eyes:  Negative for visual disturbance.   Respiratory:  Negative for cough and shortness of breath.    Cardiovascular:  Negative for chest pain, palpitations and leg swelling.   Gastrointestinal:  Negative for  abdominal pain, blood in stool, constipation and diarrhea.   Genitourinary:  Negative for difficulty urinating.   Musculoskeletal:  Negative for arthralgias.   Skin:  Negative for rash.   Neurological:  Positive for dizziness. Negative for light-headedness and headaches.   Psychiatric/Behavioral:  Negative for sleep disturbance.         Problem List:     Patient Active Problem List   Diagnosis    Essential hypertension    Hypercholesterolemia    Hypothyroidism    Incomplete uterovaginal prolapse    Other specified disorders of bladder    Primary osteoarthritis involving multiple joints    Vitamin D deficiency    Prediabetes    Left acoustic neuroma (HCC)    Overweight (BMI 25.0-29.9)    Benign paroxysmal positional vertigo    Trigeminal neuralgia of left side of face    Diarrhea    Chronic RUQ pain    Forgetfulness      Past Medical and Surgical History:     Past Medical History:   Diagnosis Date    Acoustic neuroma (HCC)     left ear, decreased hearing    Arthritis     right knee    Disease of thyroid gland     History of radiation therapy 2007    Left acoustic neuroma    Hyperlipidemia     Trigeminal neuralgia of left side of face     Urethral hypermobility     Uterine prolapse      Past Surgical History:   Procedure Laterality Date    COLONOSCOPY      WI CMBND ANTERPOST COLPORRAPHY W/CYSTO N/A 1/8/2019    Procedure: A&P COLPORRHAPHY;  Surgeon: Baron Coyle MD;  Location: [facility];  Service: UroGynecology           WI COLPOPEXY VAGINAL EXTRAPERITONEAL APPROACH N/A 1/8/2019    Procedure: ANTERIOR VE COLPOPEXY;  Surgeon: Baron Coyle MD;  Location[facility];  Service: UroGynecology           WI CYSTOURETHROSCOPY N/A 1/8/2019    Procedure: CYSTOSCOPY;  Surgeon: Baron Coyle MD;  Location[facility];  Service: UroGynecology           WI SLING OPERATION STRESS INCONTINENCE N/A 1/8/2019    Procedure: PUBOVAGINAL SLING;  Surgeon: Baron Coyle MD;  Location: [facility];  Service: UroGynecology            TUBAL LIGATION      WISDOM TOOTH EXTRACTION        Family History:     Family History   Problem Relation Age of Onset    Arthritis Family     Diabetes Family     Hypertension Family     Osteoporosis Family     Kidney disease Family     Thyroid disease Family     Hypertension Mother     Diabetes Father     Asthma Father     Lung disease Father     Thyroid disease Sister     Thyroid disease Daughter     Mental illness Neg Hx     Substance Abuse Neg Hx     Alcohol abuse Neg Hx     Depression Neg Hx       Social History:     Social History     Socioeconomic History    Marital status: /Civil Union     Spouse name: Sidney    Number of children: 3    Years of education: Assoc degree +    Highest education level: None   Occupational History    Occupation: Retired   Tobacco Use    Smoking status: Never    Smokeless tobacco: Never   Vaping Use    Vaping status: Never Used   Substance and Sexual Activity    Alcohol use: Yes     Comment: rare    Drug use: No    Sexual activity: None   Other Topics Concern    None   Social History Narrative    Drinks coffee 1-2 cups/day     Social Determinants of Health     Financial Resource Strain: Low Risk  (3/11/2024)    Overall Financial Resource Strain (CARDIA)     Difficulty of Paying Living Expenses: Not hard at all   Food Insecurity: No Food Insecurity (3/18/2024)    Hunger Vital Sign     Worried About Running Out of Food in the Last Year: Never true     Ran Out of Food in the Last Year: Never true   Transportation Needs: No Transportation Needs (3/18/2024)    PRAPARE - Transportation     Lack of Transportation (Medical): No     Lack of Transportation (Non-Medical): No   Physical Activity: Not on file   Stress: Not on file   Social Connections: Not on file   Intimate Partner Violence: Not on file   Housing Stability: Low Risk  (3/18/2024)    Housing Stability Vital Sign     Unable to Pay for Housing in the Last Year: No     Number of Places Lived in the Last Year: 1      Unstable Housing in the Last Year: No      Medications and Allergies:     Current Outpatient Medications   Medication Sig Dispense Refill    atorvastatin (LIPITOR) 20 mg tablet TAKE 1 TABLET BY MOUTH ONCE  DAILY 90 tablet 1    levothyroxine 88 mcg tablet TAKE 1 TABLET BY MOUTH DAILY 90 tablet 1    Multiple Vitamin (MULTIVITAMIN) tablet Take 1 tablet by mouth daily      OXcarbazepine (TRILEPTAL) 150 mg tablet TAKE 1 TABLET BY MOUTH TWICE A DAY 60 tablet 5     No current facility-administered medications for this visit.     Allergies   Allergen Reactions    Erythromycin Rash    Carbamazepine Rash      Immunizations:     Immunization History   Administered Date(s) Administered    COVID-19 PFIZER VACCINE 0.3 ML IM 03/16/2021, 04/06/2021, 10/09/2021    COVID-19, unspecified 03/16/2021, 04/06/2021    INFLUENZA 12/14/2018, 11/16/2019, 12/13/2021, 10/23/2022, 10/17/2023    Influenza Split High Dose Preservative Free IM 11/16/2019    Influenza, high dose seasonal 0.7 mL 10/10/2020    Influenza, injectable, quadrivalent, preservative free 0.5 mL 10/17/2023      Health Maintenance:         Topic Date Due    Breast Cancer Screening: Mammogram  03/02/2024    Colorectal Cancer Screening  08/30/2025    Hepatitis C Screening  Completed         Topic Date Due    Pneumococcal Vaccine: 65+ Years (1 of 1 - PCV) Never done    COVID-19 Vaccine (6 - 2023-24 season) 09/01/2023      Medicare Screening Tests and Risk Assessments:     Krupa is here for her Subsequent Wellness visit. Last Medicare Wellness visit information reviewed, patient interviewed and updates made to the record today.      Health Risk Assessment:   Patient rates overall health as good. Patient feels that their physical health rating is slightly worse. Patient is satisfied with their life. Eyesight was rated as same. Hearing was rated as same. Patient feels that their emotional and mental health rating is same. Patients states they are never, rarely angry. Patient  states they are never, rarely unusually tired/fatigued. Pain experienced in the last 7 days has been none. Patient states that she has experienced no weight loss or gain in last 6 months.     Depression Screening:   PHQ-2 Score: 0      Fall Risk Screening:   In the past year, patient has experienced: no history of falling in past year      Urinary Incontinence Screening:   Patient has leaked urine accidently in the last six months.     Home Safety:  Patient does not have trouble with stairs inside or outside of their home. Patient has working smoke alarms and has working carbon monoxide detector. Home safety hazards include: none.     Nutrition:   Current diet is Regular.     Medications:   Patient is currently taking over-the-counter supplements. OTC medications include: see medication list. Patient is able to manage medications.     Activities of Daily Living (ADLs)/Instrumental Activities of Daily Living (IADLs):   Walk and transfer into and out of bed and chair?: Yes  Dress and groom yourself?: Yes    Bathe or shower yourself?: Yes    Feed yourself? Yes  Do your laundry/housekeeping?: Yes  Manage your money, pay your bills and track your expenses?: Yes  Make your own meals?: Yes    Do your own shopping?: Yes    Previous Hospitalizations:   Any hospitalizations or ED visits within the last 12 months?: No      Advance Care Planning:   Living will: No    Durable POA for healthcare: No      PREVENTIVE SCREENINGS      Cardiovascular Screening:    General: Screening Not Indicated and History Lipid Disorder      Diabetes Screening:     General: Screening Current      Colorectal Cancer Screening:     General: Screening Current      Breast Cancer Screening:     General: Screening Current      Cervical Cancer Screening:    General: Screening Not Indicated      Osteoporosis Screening:    General: Screening Current      Abdominal Aortic Aneurysm (AAA) Screening:        General: Screening Not Indicated      Lung Cancer  "Screening:     General: Screening Not Indicated      Hepatitis C Screening:    General: Screening Current    Screening, Brief Intervention, and Referral to Treatment (SBIRT)    Screening  Typical number of drinks in a day: 0  Typical number of drinks in a week: 0  Interpretation: Low risk drinking behavior.    Single Item Drug Screening:  How often have you used an illegal drug (including marijuana) or a prescription medication for non-medical reasons in the past year? never    Single Item Drug Screen Score: 0  Interpretation: Negative screen for possible drug use disorder    No results found.     Physical Exam:     /76   Pulse 96   Temp (!) 96.8 °F (36 °C)   Ht 5' 2.5\" (1.588 m)   Wt 66.2 kg (146 lb)   SpO2 99%   BMI 26.28 kg/m²     Physical Exam  Vitals reviewed.   Constitutional:       Appearance: Normal appearance.   HENT:      Head: Normocephalic and atraumatic.      Right Ear: Tympanic membrane, ear canal and external ear normal.      Left Ear: Tympanic membrane, ear canal and external ear normal.   Eyes:      Conjunctiva/sclera: Conjunctivae normal.   Cardiovascular:      Rate and Rhythm: Normal rate and regular rhythm.      Pulses: Normal pulses.      Heart sounds: Normal heart sounds.   Pulmonary:      Effort: Pulmonary effort is normal.      Breath sounds: Normal breath sounds.   Abdominal:      General: Bowel sounds are normal.      Palpations: Abdomen is soft.   Musculoskeletal:         General: No swelling. Normal range of motion.      Cervical back: Neck supple.   Lymphadenopathy:      Cervical: No cervical adenopathy.   Skin:     General: Skin is warm and dry.   Neurological:      Mental Status: She is alert and oriented to person, place, and time.   Psychiatric:         Mood and Affect: Mood normal.         Behavior: Behavior normal.          ELAYNE Dominguez  "

## 2024-08-06 DIAGNOSIS — G50.0 TRIGEMINAL NEURALGIA OF LEFT SIDE OF FACE: ICD-10-CM

## 2024-08-06 RX ORDER — OXCARBAZEPINE 150 MG/1
TABLET, FILM COATED ORAL
Qty: 60 TABLET | Refills: 5 | Status: SHIPPED | OUTPATIENT
Start: 2024-08-06

## 2024-08-12 ENCOUNTER — NURSE TRIAGE (OUTPATIENT)
Age: 74
End: 2024-08-12

## 2024-08-12 DIAGNOSIS — R10.11 RUQ PAIN: Primary | ICD-10-CM

## 2024-08-13 ENCOUNTER — RA CDI HCC (OUTPATIENT)
Dept: OTHER | Facility: HOSPITAL | Age: 74
End: 2024-08-13

## 2024-08-15 ENCOUNTER — HOSPITAL ENCOUNTER (OUTPATIENT)
Dept: RADIOLOGY | Age: 74
Discharge: HOME | End: 2024-08-15
Payer: MEDICARE

## 2024-08-15 DIAGNOSIS — R10.11 RUQ PAIN: ICD-10-CM

## 2024-08-15 PROCEDURE — 76705 ECHO EXAM OF ABDOMEN: CPT

## 2024-08-19 ENCOUNTER — OFFICE VISIT (OUTPATIENT)
Dept: INTERNAL MEDICINE CLINIC | Facility: CLINIC | Age: 74
End: 2024-08-19
Payer: MEDICARE

## 2024-08-19 ENCOUNTER — TELEPHONE (OUTPATIENT)
Dept: INTERNAL MEDICINE CLINIC | Facility: CLINIC | Age: 74
End: 2024-08-19

## 2024-08-19 VITALS
DIASTOLIC BLOOD PRESSURE: 82 MMHG | TEMPERATURE: 96.7 F | SYSTOLIC BLOOD PRESSURE: 124 MMHG | OXYGEN SATURATION: 96 % | HEART RATE: 79 BPM | BODY MASS INDEX: 25.87 KG/M2 | HEIGHT: 63 IN | WEIGHT: 146 LBS

## 2024-08-19 DIAGNOSIS — R10.11 CHRONIC RUQ PAIN: Primary | ICD-10-CM

## 2024-08-19 DIAGNOSIS — G89.29 CHRONIC RUQ PAIN: Primary | ICD-10-CM

## 2024-08-19 DIAGNOSIS — K80.50 BILIARY COLIC: ICD-10-CM

## 2024-08-19 PROCEDURE — 99214 OFFICE O/P EST MOD 30 MIN: CPT | Performed by: NURSE PRACTITIONER

## 2024-08-19 PROCEDURE — G2211 COMPLEX E/M VISIT ADD ON: HCPCS | Performed by: NURSE PRACTITIONER

## 2024-08-19 NOTE — ASSESSMENT & PLAN NOTE
Normal ultrasound.   Symptoms intermittent for 8 years.   Follow a low fat diet.   Check HIDA scan.   Discussed general surgery evaluation if positive and GI evaluation if negative and pain persists.

## 2024-08-22 LAB
ALBUMIN SERPL-MCNC: 4.2 G/DL (ref 3.6–5.1)
ALBUMIN/GLOB SERPL: 1.7 (CALC) (ref 1–2.5)
ALP SERPL-CCNC: 74 U/L (ref 37–153)
ALT SERPL-CCNC: 14 U/L (ref 6–29)
AST SERPL-CCNC: 18 U/L (ref 10–35)
BASOPHILS # BLD AUTO: 58 CELLS/UL (ref 0–200)
BASOPHILS NFR BLD AUTO: 0.8 %
BILIRUB SERPL-MCNC: 0.6 MG/DL (ref 0.2–1.2)
BUN SERPL-MCNC: 15 MG/DL (ref 7–25)
BUN/CREAT SERPL: ABNORMAL (CALC) (ref 6–22)
CALCIUM SERPL-MCNC: 9.4 MG/DL (ref 8.6–10.4)
CHLORIDE SERPL-SCNC: 101 MMOL/L (ref 98–110)
CO2 SERPL-SCNC: 31 MMOL/L (ref 20–32)
CREAT SERPL-MCNC: 0.79 MG/DL (ref 0.6–1)
EOSINOPHIL # BLD AUTO: 101 CELLS/UL (ref 15–500)
EOSINOPHIL NFR BLD AUTO: 1.4 %
ERYTHROCYTE [DISTWIDTH] IN BLOOD BY AUTOMATED COUNT: 12.6 % (ref 11–15)
GFR/BSA.PRED SERPLBLD CYS-BASED-ARV: 79 ML/MIN/1.73M2
GLOBULIN SER CALC-MCNC: 2.5 G/DL (CALC) (ref 1.9–3.7)
GLUCOSE SERPL-MCNC: 104 MG/DL (ref 65–99)
HCT VFR BLD AUTO: 41.4 % (ref 35–45)
HGB BLD-MCNC: 13.9 G/DL (ref 11.7–15.5)
LIPASE SERPL-CCNC: 7 U/L (ref 7–60)
LYMPHOCYTES # BLD AUTO: 2549 CELLS/UL (ref 850–3900)
LYMPHOCYTES NFR BLD AUTO: 35.4 %
MCH RBC QN AUTO: 31.5 PG (ref 27–33)
MCHC RBC AUTO-ENTMCNC: 33.6 G/DL (ref 32–36)
MCV RBC AUTO: 93.9 FL (ref 80–100)
MONOCYTES # BLD AUTO: 857 CELLS/UL (ref 200–950)
MONOCYTES NFR BLD AUTO: 11.9 %
NEUTROPHILS # BLD AUTO: 3636 CELLS/UL (ref 1500–7800)
NEUTROPHILS NFR BLD AUTO: 50.5 %
PLATELET # BLD AUTO: 303 THOUSAND/UL (ref 140–400)
PMV BLD REES-ECKER: 10.7 FL (ref 7.5–12.5)
POTASSIUM SERPL-SCNC: 4.2 MMOL/L (ref 3.5–5.3)
PROT SERPL-MCNC: 6.7 G/DL (ref 6.1–8.1)
RBC # BLD AUTO: 4.41 MILLION/UL (ref 3.8–5.1)
SODIUM SERPL-SCNC: 141 MMOL/L (ref 135–146)
WBC # BLD AUTO: 7.2 THOUSAND/UL (ref 3.8–10.8)

## 2024-08-24 DIAGNOSIS — E03.9 HYPOTHYROIDISM, UNSPECIFIED TYPE: ICD-10-CM

## 2024-08-24 DIAGNOSIS — E78.00 HYPERCHOLESTEROLEMIA: ICD-10-CM

## 2024-08-24 RX ORDER — LEVOTHYROXINE SODIUM 88 UG/1
88 TABLET ORAL DAILY
Qty: 90 TABLET | Refills: 1 | Status: SHIPPED | OUTPATIENT
Start: 2024-08-24

## 2024-08-24 RX ORDER — ATORVASTATIN CALCIUM 20 MG/1
20 TABLET, FILM COATED ORAL DAILY
Qty: 90 TABLET | Refills: 1 | Status: SHIPPED | OUTPATIENT
Start: 2024-08-24

## 2024-08-28 ENCOUNTER — TELEPHONE (OUTPATIENT)
Age: 74
End: 2024-08-28

## 2024-08-28 ENCOUNTER — HOSPITAL ENCOUNTER (OUTPATIENT)
Dept: RADIOLOGY | Facility: HOSPITAL | Age: 74
Discharge: HOME | End: 2024-08-28
Payer: MEDICARE

## 2024-08-28 DIAGNOSIS — R10.11 CHRONIC RUQ PAIN: ICD-10-CM

## 2024-08-28 DIAGNOSIS — G89.29 CHRONIC RUQ PAIN: ICD-10-CM

## 2024-08-28 PROCEDURE — A9537 TC99M MEBROFENIN: HCPCS

## 2024-08-28 PROCEDURE — 78227 HEPATOBIL SYST IMAGE W/DRUG: CPT

## 2024-08-28 RX ORDER — SINCALIDE 5 UG/5ML
0.02 INJECTION, POWDER, LYOPHILIZED, FOR SOLUTION INTRAVENOUS
Status: COMPLETED | OUTPATIENT
Start: 2024-08-28 | End: 2024-08-28

## 2024-08-28 RX ORDER — WATER 10 ML/10ML
INJECTION INTRAMUSCULAR; INTRAVENOUS; SUBCUTANEOUS
Status: COMPLETED
Start: 2024-08-28 | End: 2024-08-28

## 2024-08-28 RX ORDER — SINCALIDE 5 UG/5ML
INJECTION, POWDER, LYOPHILIZED, FOR SOLUTION INTRAVENOUS
Status: COMPLETED
Start: 2024-08-28 | End: 2024-08-28

## 2024-08-28 RX ADMIN — SINCALIDE 1.3 MCG: 5 INJECTION, POWDER, LYOPHILIZED, FOR SOLUTION INTRAVENOUS at 12:03

## 2024-08-28 RX ADMIN — WATER 5 ML: 1 INJECTION INTRAMUSCULAR; INTRAVENOUS; SUBCUTANEOUS at 11:39

## 2024-08-28 NOTE — TELEPHONE ENCOUNTER
Patient would like recommendation for surgeon to have gallbladder removed per your recent conversation Please advise

## 2024-08-30 ENCOUNTER — NURSE TRIAGE (OUTPATIENT)
Age: 74
End: 2024-08-30

## 2024-08-30 NOTE — TELEPHONE ENCOUNTER
"Patient calls in today, because a couple of days ago, she fell asleep in her chair and woke up unable to open her right eye for a few seconds.  Patient was sleeping on that side.  This has not happened since.  Patient denies headaches, dizziness, weakness or unsteady gait.  She states she just wants to make Deya aware.  She also states that her Trigeminal Neuralgia.  Has been bothering her more.  Patient has an appointment on 9/16 and wanted Deya aware of the above.  Patient advised to call back should symptoms reoccur or get worse.       Reason for Disposition   Mild eye pain and present < 24 hours    Answer Assessment - Initial Assessment Questions  1. ONSET: \"When did the pain start?\" (e.g., minutes, hours, days)      When I woke up, for a few seconds, my right eye felt like it didn't want to open  2. TIMING: \"Does the pain come and go, or has it been constant since it started?\" (e.g., constant, intermittent, fleeting)      Lasted for a few seconds and has not had since.   3. SEVERITY: \"How bad is the pain?\"   (Scale 1-10; mild, moderate or severe)    - MILD (1-3): doesn't interfere with normal activities     - MODERATE (4-7): interferes with normal activities or awakens from sleep     - SEVERE (8-10): excruciating pain and patient unable to do normal activities      N/A  4. LOCATION: \"Where does it hurt?\"  (e.g., eyelid, eye, cheekbone)      N/A  5. CAUSE: \"What do you think is causing the pain?\"      Unknown   6. VISION: \"Do you have blurred vision or changes in your vision?\"       Denies  7. EYE DISCHARGE: \"Is there any discharge (pus) from the eye(s)?\"  If yes, ask: \"What color is it?\"       Denies   8. FEVER: \"Do you have a fever?\" If Yes, ask: \"What is it, how was it measured, and when did it start?\"       Denies   9. OTHER SYMPTOMS: \"Do you have any other symptoms?\" (e.g., headache, nasal discharge, facial rash)      Denies   10. PREGNANCY: \"Is there any chance you are pregnant?\" \"When was your last " "menstrual period?\"        N/A    Protocols used: Eye Pain-ADULT-[place]    "

## 2024-08-30 NOTE — TELEPHONE ENCOUNTER
Continue to monitor. Try to avoid laying on that side if she can.   If symptoms occur again and are associated with any headache, dizziness, or change in vision, recommend ER evaluation.

## 2024-08-30 NOTE — TELEPHONE ENCOUNTER
Regarding: eye problem  ----- Message from Oksana MARQUEZ sent at 8/30/2024 12:24 PM EDT -----  I had an unusual experience the other day. I was sitting in a chair and fell asleep.  When I woke up, for a few seconds, my right eye felt like it didn't want to open.  A couple of days before that, when I woke up in the morning, it took a few seconds for the vision in my right eye to clear up.  I thought it was because I slept too hard on the pillow on that side and put too much pressure on the eye.  I just wanted it noted in my file in case something was starting to happen to me.  I have also had some light twinges in my left jaw even with taking Oxcarbazepine.  Hopefully it will not continue.

## 2024-09-03 ENCOUNTER — NURSE TRIAGE (OUTPATIENT)
Age: 74
End: 2024-09-03

## 2024-09-03 ENCOUNTER — APPOINTMENT (EMERGENCY)
Dept: CT IMAGING | Facility: HOSPITAL | Age: 74
DRG: 092 | End: 2024-09-03
Payer: MEDICARE

## 2024-09-03 ENCOUNTER — HOSPITAL ENCOUNTER (INPATIENT)
Facility: HOSPITAL | Age: 74
LOS: 1 days | Discharge: HOME | DRG: 092 | End: 2024-09-05
Attending: EMERGENCY MEDICINE | Admitting: INTERNAL MEDICINE
Payer: MEDICARE

## 2024-09-03 DIAGNOSIS — R29.810 FACIAL DROOP: ICD-10-CM

## 2024-09-03 DIAGNOSIS — R29.90 STROKE-LIKE SYMPTOMS: Primary | ICD-10-CM

## 2024-09-03 DIAGNOSIS — E78.00 HYPERCHOLESTEROLEMIA: ICD-10-CM

## 2024-09-03 PROBLEM — K82.8 DYSFUNCTIONAL GALLBLADDER: Status: ACTIVE | Noted: 2024-09-03

## 2024-09-03 LAB
ANION GAP SERPL CALCULATED.3IONS-SCNC: 6 MMOL/L (ref 4–13)
APTT PPP: 24 SECONDS (ref 23–34)
BUN SERPL-MCNC: 23 MG/DL (ref 5–25)
CALCIUM SERPL-MCNC: 9.3 MG/DL (ref 8.4–10.2)
CARDIAC TROPONIN I PNL SERPL HS: 4 NG/L
CHLORIDE SERPL-SCNC: 104 MMOL/L (ref 96–108)
CO2 SERPL-SCNC: 30 MMOL/L (ref 21–32)
CREAT SERPL-MCNC: 1.06 MG/DL (ref 0.6–1.3)
ERYTHROCYTE [DISTWIDTH] IN BLOOD BY AUTOMATED COUNT: 13.3 % (ref 11.6–15.1)
FLUAV RNA RESP QL NAA+PROBE: NEGATIVE
FLUBV RNA RESP QL NAA+PROBE: NEGATIVE
GFR SERPL CREATININE-BSD FRML MDRD: 52 ML/MIN/1.73SQ M
GLUCOSE SERPL-MCNC: 114 MG/DL (ref 65–140)
GLUCOSE SERPL-MCNC: 127 MG/DL (ref 65–140)
HCT VFR BLD AUTO: 41.2 % (ref 34.8–46.1)
HGB BLD-MCNC: 13.4 G/DL (ref 11.5–15.4)
INR PPP: 0.92 (ref 0.85–1.19)
MCH RBC QN AUTO: 31.8 PG (ref 26.8–34.3)
MCHC RBC AUTO-ENTMCNC: 32.5 G/DL (ref 31.4–37.4)
MCV RBC AUTO: 98 FL (ref 82–98)
PLATELET # BLD AUTO: 264 THOUSANDS/UL (ref 149–390)
PMV BLD AUTO: 10 FL (ref 8.9–12.7)
POTASSIUM SERPL-SCNC: 4.1 MMOL/L (ref 3.5–5.3)
PROTHROMBIN TIME: 13 SECONDS (ref 12.3–15)
RBC # BLD AUTO: 4.22 MILLION/UL (ref 3.81–5.12)
RSV RNA RESP QL NAA+PROBE: NEGATIVE
SARS-COV-2 RNA RESP QL NAA+PROBE: NEGATIVE
SODIUM SERPL-SCNC: 140 MMOL/L (ref 135–147)
WBC # BLD AUTO: 10.33 THOUSAND/UL (ref 4.31–10.16)

## 2024-09-03 PROCEDURE — 93005 ELECTROCARDIOGRAM TRACING: CPT

## 2024-09-03 PROCEDURE — 70496 CT ANGIOGRAPHY HEAD: CPT

## 2024-09-03 PROCEDURE — 85730 THROMBOPLASTIN TIME PARTIAL: CPT

## 2024-09-03 PROCEDURE — 36415 COLL VENOUS BLD VENIPUNCTURE: CPT

## 2024-09-03 PROCEDURE — 85027 COMPLETE CBC AUTOMATED: CPT

## 2024-09-03 PROCEDURE — 99222 1ST HOSP IP/OBS MODERATE 55: CPT | Performed by: NURSE PRACTITIONER

## 2024-09-03 PROCEDURE — 84484 ASSAY OF TROPONIN QUANT: CPT

## 2024-09-03 PROCEDURE — 85610 PROTHROMBIN TIME: CPT

## 2024-09-03 PROCEDURE — 99285 EMERGENCY DEPT VISIT HI MDM: CPT | Performed by: EMERGENCY MEDICINE

## 2024-09-03 PROCEDURE — 83036 HEMOGLOBIN GLYCOSYLATED A1C: CPT | Performed by: NURSE PRACTITIONER

## 2024-09-03 PROCEDURE — 0241U HB NFCT DS VIR RESP RNA 4 TRGT: CPT

## 2024-09-03 PROCEDURE — 70498 CT ANGIOGRAPHY NECK: CPT

## 2024-09-03 PROCEDURE — 99285 EMERGENCY DEPT VISIT HI MDM: CPT

## 2024-09-03 PROCEDURE — 82948 REAGENT STRIP/BLOOD GLUCOSE: CPT

## 2024-09-03 PROCEDURE — 80048 BASIC METABOLIC PNL TOTAL CA: CPT

## 2024-09-03 RX ORDER — CLOPIDOGREL BISULFATE 75 MG/1
75 TABLET ORAL DAILY
Status: DISCONTINUED | OUTPATIENT
Start: 2024-09-04 | End: 2024-09-05 | Stop reason: HOSPADM

## 2024-09-03 RX ORDER — CLOPIDOGREL BISULFATE 75 MG/1
300 TABLET ORAL ONCE
Status: COMPLETED | OUTPATIENT
Start: 2024-09-03 | End: 2024-09-03

## 2024-09-03 RX ORDER — ACETAMINOPHEN 500 MG
1000 TABLET ORAL EVERY 6 HOURS PRN
COMMUNITY
End: 2024-10-22

## 2024-09-03 RX ORDER — AMOXICILLIN 250 MG
2 CAPSULE ORAL
Status: DISCONTINUED | OUTPATIENT
Start: 2024-09-03 | End: 2024-09-05 | Stop reason: HOSPADM

## 2024-09-03 RX ORDER — ASPIRIN 81 MG/1
81 TABLET, CHEWABLE ORAL DAILY
Status: DISCONTINUED | OUTPATIENT
Start: 2024-09-04 | End: 2024-09-05 | Stop reason: HOSPADM

## 2024-09-03 RX ORDER — ACETAMINOPHEN 325 MG/1
650 TABLET ORAL EVERY 6 HOURS PRN
Status: DISCONTINUED | OUTPATIENT
Start: 2024-09-03 | End: 2024-09-03

## 2024-09-03 RX ORDER — ATORVASTATIN CALCIUM 40 MG/1
40 TABLET, FILM COATED ORAL DAILY
Status: DISCONTINUED | OUTPATIENT
Start: 2024-09-04 | End: 2024-09-05 | Stop reason: HOSPADM

## 2024-09-03 RX ORDER — OXCARBAZEPINE 150 MG/1
150 TABLET, FILM COATED ORAL 2 TIMES DAILY
Status: DISCONTINUED | OUTPATIENT
Start: 2024-09-04 | End: 2024-09-05 | Stop reason: HOSPADM

## 2024-09-03 RX ORDER — LANOLIN ALCOHOL/MO/W.PET/CERES
3 CREAM (GRAM) TOPICAL
Status: DISCONTINUED | OUTPATIENT
Start: 2024-09-03 | End: 2024-09-05 | Stop reason: HOSPADM

## 2024-09-03 RX ORDER — ACETAMINOPHEN 325 MG/1
650 TABLET ORAL EVERY 6 HOURS PRN
Status: DISCONTINUED | OUTPATIENT
Start: 2024-09-03 | End: 2024-09-05 | Stop reason: HOSPADM

## 2024-09-03 RX ORDER — ASPIRIN 81 MG/1
324 TABLET, CHEWABLE ORAL ONCE
Status: COMPLETED | OUTPATIENT
Start: 2024-09-03 | End: 2024-09-03

## 2024-09-03 RX ORDER — IBUPROFEN 200 MG
200 TABLET ORAL EVERY 6 HOURS PRN
COMMUNITY
End: 2024-09-16 | Stop reason: ALTCHOICE

## 2024-09-03 RX ORDER — LEVOTHYROXINE SODIUM 88 UG/1
88 TABLET ORAL
Status: DISCONTINUED | OUTPATIENT
Start: 2024-09-04 | End: 2024-09-05 | Stop reason: HOSPADM

## 2024-09-03 RX ORDER — HEPARIN SODIUM 5000 [USP'U]/ML
5000 INJECTION, SOLUTION INTRAVENOUS; SUBCUTANEOUS EVERY 8 HOURS SCHEDULED
Status: DISCONTINUED | OUTPATIENT
Start: 2024-09-03 | End: 2024-09-04

## 2024-09-03 RX ADMIN — ASPIRIN 81 MG 324 MG: 81 TABLET ORAL at 23:39

## 2024-09-03 RX ADMIN — ACETAMINOPHEN 650 MG: 325 TABLET, FILM COATED ORAL at 23:42

## 2024-09-03 RX ADMIN — CLOPIDOGREL BISULFATE 300 MG: 75 TABLET ORAL at 21:54

## 2024-09-03 RX ADMIN — IOHEXOL 85 ML: 350 INJECTION, SOLUTION INTRAVENOUS at 20:24

## 2024-09-03 RX ADMIN — HEPARIN SODIUM 5000 UNITS: 5000 INJECTION INTRAVENOUS; SUBCUTANEOUS at 23:39

## 2024-09-03 NOTE — TELEPHONE ENCOUNTER
Spoke with patient. She states severe pain has subsided. She will check with surgeon's office for a sooner appointment. She will also call eye Dr for sooner appointment. If abdominal pain returns advised to go to ER

## 2024-09-03 NOTE — TELEPHONE ENCOUNTER
"Reason for Disposition  • Nursing judgment    Answer Assessment - Initial Assessment Questions  1. REASON FOR CALL or QUESTION: \"What is your reason for calling today?\" or \"How can I best help you?\" or \"What question do you have that I can help answer?\"      Patient wanted her PCP to know about having the gallbladder attacks . Taking advil and tylenol . Appt with Gastro in September . Asking if she should call to see if they can see her sooner? If another GI doctor in the office has an opening. She only asked for an appt with Dr Wright because that is who PCP recommended .  Also her right eye sometimes seems to not open right away , takes about a few seconds to open. Denies pain or double vision in the right eye . Doesn't happen all the time. Seeing Eye doctor Dec 6th , regular scheduled appt.    Protocols used: Information Only Call - No Triage-ADULT-[place]    "

## 2024-09-03 NOTE — TELEPHONE ENCOUNTER
Regarding: abdominal pain  ----- Message from Oksana MARQUEZ sent at 9/3/2024 10:05 AM EDT -----  Woke up at 2:30 am with a lot of pain in the gallbladder.  Really hurts.  Rested in a sitting position. Took a while and it eased off some.  Must have fallen asleep and woke up at 5:30 am and my right eye was hard to open.  This happened before but this time I noticed it more.  Now I'm getting worried.

## 2024-09-03 NOTE — TELEPHONE ENCOUNTER
I see she has an appointment with the surgeon on 9/18. Is she asking if she should see GI instead of the surgeon? Or see the surgeon sooner?    If she has severe pain that does not improve, would recommend ER.     As far as her eye, any drainage or redness? Is she able to schedule with the eye doctor sooner since this is a new problem?

## 2024-09-04 ENCOUNTER — APPOINTMENT (OUTPATIENT)
Dept: NON INVASIVE DIAGNOSTICS | Facility: HOSPITAL | Age: 74
DRG: 092 | End: 2024-09-04
Payer: MEDICARE

## 2024-09-04 ENCOUNTER — TELEPHONE (OUTPATIENT)
Age: 74
End: 2024-09-04

## 2024-09-04 ENCOUNTER — APPOINTMENT (INPATIENT)
Dept: MRI IMAGING | Facility: HOSPITAL | Age: 74
DRG: 092 | End: 2024-09-04
Payer: MEDICARE

## 2024-09-04 LAB
ALBUMIN SERPL BCG-MCNC: 3.8 G/DL (ref 3.5–5)
ALP SERPL-CCNC: 58 U/L (ref 34–104)
ALT SERPL W P-5'-P-CCNC: 11 U/L (ref 7–52)
ANION GAP SERPL CALCULATED.3IONS-SCNC: 6 MMOL/L (ref 4–13)
AORTIC ROOT: 3.5 CM
AORTIC VALVE MEAN VELOCITY: 10.6 M/S
APICAL FOUR CHAMBER EJECTION FRACTION: 64 %
ASCENDING AORTA: 2.9 CM
AST SERPL W P-5'-P-CCNC: 17 U/L (ref 13–39)
ATRIAL RATE: 84 BPM
AV AREA BY CONTINUOUS VTI: 1.5 CM2
AV AREA PEAK VELOCITY: 1.5 CM2
AV LVOT MEAN GRADIENT: 1 MMHG
AV LVOT PEAK GRADIENT: 2 MMHG
AV MEAN GRADIENT: 5 MMHG
AV PEAK GRADIENT: 10 MMHG
AV VALVE AREA: 1.53 CM2
AV VELOCITY RATIO: 0.48
BILIRUB SERPL-MCNC: 0.52 MG/DL (ref 0.2–1)
BSA FOR ECHO PROCEDURE: 1.65 M2
BUN SERPL-MCNC: 17 MG/DL (ref 5–25)
CALCIUM SERPL-MCNC: 9 MG/DL (ref 8.4–10.2)
CHLORIDE SERPL-SCNC: 106 MMOL/L (ref 96–108)
CHOLEST SERPL-MCNC: 162 MG/DL
CO2 SERPL-SCNC: 29 MMOL/L (ref 21–32)
CREAT SERPL-MCNC: 0.78 MG/DL (ref 0.6–1.3)
DOP CALC AO PEAK VEL: 1.61 M/S
DOP CALC AO VTI: 35.87 CM
DOP CALC LVOT AREA: 3.14 CM2
DOP CALC LVOT CARDIAC INDEX: 2.24 L/MIN/M2
DOP CALC LVOT CARDIAC OUTPUT: 3.71 L/MIN
DOP CALC LVOT DIAMETER: 2 CM
DOP CALC LVOT PEAK VEL VTI: 17.46 CM
DOP CALC LVOT PEAK VEL: 0.77 M/S
DOP CALC LVOT STROKE INDEX: 31.9 ML/M2
DOP CALC LVOT STROKE VOLUME: 54.82
E WAVE DECELERATION TIME: 268 MS
E/A RATIO: 0.87
ERYTHROCYTE [DISTWIDTH] IN BLOOD BY AUTOMATED COUNT: 13.2 % (ref 11.6–15.1)
EST. AVERAGE GLUCOSE BLD GHB EST-MCNC: 128 MG/DL
FRACTIONAL SHORTENING: 34 (ref 28–44)
GFR SERPL CREATININE-BSD FRML MDRD: 76 ML/MIN/1.73SQ M
GLUCOSE P FAST SERPL-MCNC: 93 MG/DL (ref 65–99)
GLUCOSE SERPL-MCNC: 93 MG/DL (ref 65–140)
HBA1C MFR BLD: 6.1 %
HCT VFR BLD AUTO: 40 % (ref 34.8–46.1)
HDLC SERPL-MCNC: 40 MG/DL
HGB BLD-MCNC: 12.9 G/DL (ref 11.5–15.4)
INTERVENTRICULAR SEPTUM IN DIASTOLE (PARASTERNAL SHORT AXIS VIEW): 1 CM
INTERVENTRICULAR SEPTUM: 1 CM (ref 0.6–1.1)
LAAS-AP2: 12.5 CM2
LAAS-AP4: 11.4 CM2
LDLC SERPL CALC-MCNC: 87 MG/DL (ref 0–100)
LEFT ATRIUM SIZE: 3 CM
LEFT ATRIUM VOLUME (MOD BIPLANE): 29 ML
LEFT ATRIUM VOLUME INDEX (MOD BIPLANE): 17.5 ML/M2
LEFT INTERNAL DIMENSION IN SYSTOLE: 2.9 CM (ref 2.1–4)
LEFT VENTRICULAR INTERNAL DIMENSION IN DIASTOLE: 4.4 CM (ref 3.5–6)
LEFT VENTRICULAR POSTERIOR WALL IN END DIASTOLE: 0.8 CM
LEFT VENTRICULAR STROKE VOLUME: 55 ML
LVSV (TEICH): 55 ML
MCH RBC QN AUTO: 31.2 PG (ref 26.8–34.3)
MCHC RBC AUTO-ENTMCNC: 32.3 G/DL (ref 31.4–37.4)
MCV RBC AUTO: 97 FL (ref 82–98)
MV E'TISSUE VEL-SEP: 9 CM/S
MV PEAK A VEL: 0.99 M/S
MV PEAK E VEL: 86 CM/S
MV STENOSIS PRESSURE HALF TIME: 78 MS
MV VALVE AREA P 1/2 METHOD: 2.82
NONHDLC SERPL-MCNC: 122 MG/DL
P AXIS: 76 DEGREES
PLATELET # BLD AUTO: 250 THOUSANDS/UL (ref 149–390)
PMV BLD AUTO: 10.5 FL (ref 8.9–12.7)
POTASSIUM SERPL-SCNC: 3.9 MMOL/L (ref 3.5–5.3)
PR INTERVAL: 154 MS
PROT SERPL-MCNC: 6.4 G/DL (ref 6.4–8.4)
QRS AXIS: 65 DEGREES
QRSD INTERVAL: 82 MS
QT INTERVAL: 392 MS
QTC INTERVAL: 463 MS
RBC # BLD AUTO: 4.13 MILLION/UL (ref 3.81–5.12)
RIGHT ATRIUM AREA SYSTOLE A4C: 12.3 CM2
RIGHT VENTRICLE ID DIMENSION: 2.9 CM
SINOTUBULAR JUNCTION: 2.4 CM
SL CV LEFT ATRIUM LENGTH A2C: 4.6 CM
SL CV LV EF: 60
SL CV PED ECHO LEFT VENTRICLE DIASTOLIC VOLUME (MOD BIPLANE) 2D: 86 ML
SL CV PED ECHO LEFT VENTRICLE SYSTOLIC VOLUME (MOD BIPLANE) 2D: 32 ML
SL CV SINUS OF VALSALVA 2D: 3.5 CM
SODIUM SERPL-SCNC: 141 MMOL/L (ref 135–147)
STJ: 2.4 CM
T WAVE AXIS: 62 DEGREES
TRICUSPID ANNULAR PLANE SYSTOLIC EXCURSION: 1.7 CM
TRIGL SERPL-MCNC: 174 MG/DL
VENTRICULAR RATE: 84 BPM
WBC # BLD AUTO: 9.37 THOUSAND/UL (ref 4.31–10.16)

## 2024-09-04 PROCEDURE — 93306 TTE W/DOPPLER COMPLETE: CPT | Performed by: INTERNAL MEDICINE

## 2024-09-04 PROCEDURE — 80061 LIPID PANEL: CPT | Performed by: NURSE PRACTITIONER

## 2024-09-04 PROCEDURE — 80053 COMPREHEN METABOLIC PANEL: CPT | Performed by: NURSE PRACTITIONER

## 2024-09-04 PROCEDURE — 93010 ELECTROCARDIOGRAM REPORT: CPT | Performed by: INTERNAL MEDICINE

## 2024-09-04 PROCEDURE — 99232 SBSQ HOSP IP/OBS MODERATE 35: CPT | Performed by: INTERNAL MEDICINE

## 2024-09-04 PROCEDURE — 99205 OFFICE O/P NEW HI 60 MIN: CPT | Performed by: PSYCHIATRY & NEUROLOGY

## 2024-09-04 PROCEDURE — 93306 TTE W/DOPPLER COMPLETE: CPT

## 2024-09-04 PROCEDURE — 70551 MRI BRAIN STEM W/O DYE: CPT

## 2024-09-04 PROCEDURE — 85027 COMPLETE CBC AUTOMATED: CPT | Performed by: NURSE PRACTITIONER

## 2024-09-04 RX ORDER — HEPARIN SODIUM 5000 [USP'U]/ML
5000 INJECTION, SOLUTION INTRAVENOUS; SUBCUTANEOUS EVERY 8 HOURS SCHEDULED
Status: DISCONTINUED | OUTPATIENT
Start: 2024-09-04 | End: 2024-09-04

## 2024-09-04 RX ORDER — CLOPIDOGREL BISULFATE 75 MG/1
75 TABLET ORAL DAILY
Qty: 21 TABLET | Refills: 0 | Status: CANCELLED | OUTPATIENT
Start: 2024-09-05 | End: 2024-09-26

## 2024-09-04 RX ORDER — HEPARIN SODIUM 5000 [USP'U]/ML
5000 INJECTION, SOLUTION INTRAVENOUS; SUBCUTANEOUS EVERY 8 HOURS SCHEDULED
Status: DISCONTINUED | OUTPATIENT
Start: 2024-09-04 | End: 2024-09-05 | Stop reason: HOSPADM

## 2024-09-04 RX ADMIN — OXCARBAZEPINE 150 MG: 150 TABLET, FILM COATED ORAL at 08:09

## 2024-09-04 RX ADMIN — ASPIRIN 81 MG 81 MG: 81 TABLET ORAL at 08:09

## 2024-09-04 RX ADMIN — HEPARIN SODIUM 5000 UNITS: 5000 INJECTION INTRAVENOUS; SUBCUTANEOUS at 21:05

## 2024-09-04 RX ADMIN — LEVOTHYROXINE SODIUM 88 MCG: 88 TABLET ORAL at 05:40

## 2024-09-04 RX ADMIN — ATORVASTATIN CALCIUM 40 MG: 40 TABLET, FILM COATED ORAL at 08:09

## 2024-09-04 RX ADMIN — HEPARIN SODIUM 5000 UNITS: 5000 INJECTION INTRAVENOUS; SUBCUTANEOUS at 05:40

## 2024-09-04 RX ADMIN — MULTIPLE VITAMINS W/ MINERALS TAB 1 TABLET: TAB ORAL at 08:09

## 2024-09-04 RX ADMIN — OXCARBAZEPINE 150 MG: 150 TABLET, FILM COATED ORAL at 21:05

## 2024-09-04 RX ADMIN — CLOPIDOGREL BISULFATE 75 MG: 75 TABLET ORAL at 08:09

## 2024-09-04 NOTE — OCCUPATIONAL THERAPY NOTE
Occupational Therapy Screen     Patient Name: Krupa Baxter  Today's Date: 9/4/2024  Problem List  Principal Problem:    Stroke-like symptoms  Active Problems:    Essential hypertension    Hypercholesterolemia    Trigeminal neuralgia of left side of face    Dysfunctional gallbladder        09/04/24 1029   OT Last Visit   OT Visit Date 09/04/24   Note Type   Note type Screen   Additional Comments OT orders recieved, chart reviewed. Pt admitted under stroke pathway for R sided weakness, aphasia, confusion. Spoke with patient at bedside who reports all symptoms have resolved, reports no difficulties completing ADLs or mobility in room, no concerns re: functional status upon D/C. CT (-) for acute intracranial abnormalities.   MRI pending. Pt appears to be functioning at baseline, no IP OT needs indicated. Will D/C from OT caseload, please reconsult for changes in functional status.     Ambreen Mir, OT

## 2024-09-04 NOTE — ASSESSMENT & PLAN NOTE
Right facial weakness, aphasia, confusion, difficulty walking.  Symptoms resolved currently  CTH: negative.  Known vestibular schwannoma.  CTA no LVO or significant stenosis.  Small left and right ICA aneurysms  Loaded with plavix, and aspirin   Echo: The left ventricular ejection fraction is 60%. Systolic function is normal. Wall motion is normal. Diastolic function is mildly abnormal, consistent with grade I (abnormal) relaxation.     PLAN:  Continue aspirin 81 Mg  Continue Plavix  Continue atorvastatin  Pending MRI

## 2024-09-04 NOTE — ASSESSMENT & PLAN NOTE
As per patient, she was sitting and watching TV when her  noticed she stopped speaking and she appeared to have facial droop on the right side.  When she was getting to the car, she seemed to be confused and stumbling.    Initial NIHSS 1  Presenting deficits were: R facial droop, speech difficulties, confusion, gait imbalance  Interventions: Patient not a candidate. Symptoms resolved/clearly non disabling.     Workup:  Lab Results   Component Value Date    HGBA1C 6.1 (H) 09/03/2024    CHOLESTEROL 162 09/04/2024    LDLCALC 87 09/04/2024    TRIG 174 (H) 09/04/2024    INR 0.92 09/03/2024      CTH: No acute intracranial abnormality. 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 Known left vestibular schwannoma is better evaluated on MRI brain with and without contrast 4/2/2023.  CTA: Negative CTA head and neck for large vessel occlusion, dissection, or high-grade stenosis. 0.3 cm aneurysm in left ICA supraclinoid segment projecting inferomedially 0.2 cm aneurysm in right ICA supraclinoid segment projecting inferomedially. Recommend consultation with the [facility], a division of [facility] at [phone]. Partially imaged mildly enhancing left vestibular schwannoma.   MRI: No acute infarct identified. Hemosiderin staining along the sulci of the left superior frontal lobe. There are also scattered areas of peripherally located remote blood products. These appear new/increased in comparison to prior exams, however technical differences limit the comparison. No acute blood products seen. Correlate for history of trauma, and recommend consultation with the neurovascular Center given the patient's history of aneurysm.  TTE/ANGIE: Left ventricular cavity size is normal. Wall thickness is normal. The left ventricular ejection fraction is 60%. Systolic function is normal. Wall motion is normal. Diastolic function is mildly abnormal, consistent with grade I (abnormal) relaxation.      Pertinent scores as of 09/05/24:  - NIHSS: 1    Impression: Patient is a 74 year old female who presented to the ED for stroke-like symptoms. Given the quick resolution of symptoms along with patient's risk factors, etiology most likely a high risk TIA given patient's ABCD2 score of 5 versus amyloid spells in the setting of probable CAA (age, clinical history, multiple hemorrhages with no explanation).  Patient should be on an AP and high-intensity statin for stroke prevention. Cannot rule out seizures so obtain routine EEG in the outpatient setting.    Plan:  Case discussed with Dr. Benjamin  Continue AP/AC: Loaded with  mg x1 and Plavix 300 mg x1, maintenance with ASA 81 mg daily only given the possibility of CAA with the MRI findings.  Continue high-intensity statin: Lipitor 40 mg  Routine EEG outpatient to rule out seizures  Telemetry  Recommend stroke risk factor optimization   Healthy diet encouraged  PT/OT versus active exercise discussed  BP management and HTN med compliance discussed, continue normotension, goal SBP <140  Rest of care as per primary care team   No further inpatient recommendations at this time Please contact Neurology with any further questions or concerns   Patient will need outpatient Neurovascular follow-up in 6-8 weeks.

## 2024-09-04 NOTE — ASSESSMENT & PLAN NOTE
Worsening pain over the last few weeks  Outpatient work up   RUQ US: no stones  HIDA: EF 22%  Referred to surgery as outpt   CMP in AM

## 2024-09-04 NOTE — ASSESSMENT & PLAN NOTE
200/80 on arrival.  Not on anti hypertensive at home  Permissive hypertension first 24-48 hours    PLAN:  Consider amlodipine on discharge

## 2024-09-04 NOTE — QUICK NOTE
Neurology Stroke Alert Documentation    Stroke alert called at 2015, neurology response was immediate. Discussed case with ED over phone. History and examination per ED.    Krupa Baxter is a 74 year old woman who presented to the hospital for evaluation of R facial weakness and aphasia/confusion. Prior to arrival, pt was sitting and watching TV with her  when he looked over and noticed that she stopped speaking and she appeared to have a right facial droop. No apparent focal weakness in the extremities but was noted to have stumbled to the car and was confused. By the time she arrived to the ED symptoms were resolving. Initial NIHSS 1 for right facial weakness, which essentially was resolved on re-evaluation after CT scans were completed. No other noted findings on examination or history. Not on AP/AC at home. Has a reported allergy to aspirin.   - NIHSS 1  - LKW 2000 this evening    Discussed with Radiology:  CTH: no acute intracranial abnormality; known vestibular schwannoma somewhat appreciated  CTA H/N: no LVO or significant stenosis; small left ICA and right ICA aneurysms     Given significant improvement in symptoms with non-disabling residual symptoms on initial evaluation then resolution of symptoms, TNK deferred.    Not an endovascular candidate since no LVO.     Admit for stroke/TIA workup. Reported aspirin allergy. Load with Plavix 300 mg once then start Plavix 75 mg daily tomorrow for now. MRI brain. Stroke pathway.

## 2024-09-04 NOTE — CONSULTS
NEUROLOGY RESIDENCY CONSULT NOTE     Name: Krupa Baxter   Age & Sex: 74 y.o. female   MRN: [id number]  Unit/Bed#: S -01   Encounter: [id number]  Length of Stay: 0    Recommendations for outpatient neurological follow up have yet to be determined.      Pending for discharge: Clinical Improvement    ASSESSMENT & PLAN     * Stroke-like symptoms  Assessment & Plan  As per patient, she was sitting and watching TV when her  noticed she stopped speaking and she appeared to have facial droop on the right side.  When she was getting to the car, she seemed to be confused and stumbling.    Initial NIHSS 1  Presenting deficits were: R facial droop, speech difficulties, confusion, gait imbalance  Interventions: Patient not a candidate. Symptoms resolved/clearly non disabling.     Workup:  Lab Results   Component Value Date    HGBA1C 6.1 (H) 09/03/2024    CHOLESTEROL 162 09/04/2024    LDLCALC 87 09/04/2024    TRIG 174 (H) 09/04/2024    INR 0.92 09/03/2024      CTH: No acute intracranial abnormality. 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 Known left vestibular schwannoma is better evaluated on MRI brain with and without contrast 4/2/2023.  CTA: Negative CTA head and neck for large vessel occlusion, dissection, or high-grade stenosis. 0.3 cm aneurysm in left ICA supraclinoid segment projecting inferomedially 0.2 cm aneurysm in right ICA supraclinoid segment projecting inferomedially. Recommend consultation with the [facility] Center, a division of [facility] at [phone]. Partially imaged mildly enhancing left vestibular schwannoma.   MRI: Pending  TTE/ANGIE: Pending    Pertinent scores as of 09/04/24:  - NIHSS: 1    Impression: Patient is a 74 year old female who presented to the ED for stroke-like symptoms. Given the quick resolution of symptoms along with patient's risk factors, etiology most likely a high risk TIA versus stroke pending MRI.  Patient should be on an AP and  high-intensity statin for stroke prevention.    Plan:  Case discussed with Dr. Benjamin  Continue AP/AC: Loaded with  mg x1 and Plavix 300 mg x1, maintenance with ASA 81 mg daily and Plavix 75 mg daily  Patient confirmed no allergy to ASA  Continue high-intensity statin: Lipitor 40 mg  MRI Brain wo contrast pending  Echocardiogram pending  Telemetry  Recommend stroke risk factor optimization   Healthy diet encouraged  PT/OT versus active exercise discussed  BP management and HTN med compliance discussed, continue permissive HTN, SBP <220   Rest of care as per primary care team         SUBJECTIVE     Reason for Consult / Principal Problem: Stroke-like symptoms  Hx and PE limited by: None    HPI: Krupa Baxter is a 74 y.o. right handed female who presented to the ED as a result of stroke-like symptoms.  Patient has a past medical history of an acoustic neuroma, arthritis, thyroid disease, hyperlipidemia, and trigeminal neuralgia.    As per patient, she was sitting and watching TV when her  noticed she stopped speaking and she appeared to have facial droop on the right side.  When she was getting to the car, she seemed to be confused and stumbling. Patient states that this episode lasted approximately 2 hours because the symptoms resolved completely when she got to the floor after admission.    On presentation to the ED, patient's NIHSS was 1 and her symptoms were already resolving.  Last known well was 8 PM on 9/3/2024.  Patient's blood pressure was 203/83.  CTH showed no acute intracranial abnormalities except for a known left vestibular schwannoma.  CTA showed no focal stenosis or LVO.  Given that patient's symptoms are resolving, patient not a candidate for TNK.  Given no LVO, patient not a candidate for thrombectomy.    Of note, patient stated that for the last week she has had a multitude of weird symptoms since Wednesday.  On Wednesday, patient had blurry vision in her right eye which then  resolved on its own.  On Thursday, patient is having difficulties opening of her right eye.  Then, on Saturday she had the same difficulty opening up her right eye.  Also had this pain which lasted approximately 30 minutes to 1 hour deep inside her brain.  Patient does have a history of trigeminal neuralgia.  Then, patient stated that there was 1 time where she was going downhill and was breaking.  She was trying to add feel that she was leveling down so that way she could start picking up the speed but she noticed that she never actually moved her foot.  All of these episodes, patient has not noted any other neurological symptoms such as numbness, weakness, tingling, speech difficulties, or headaches.  During all of these events she has gone right back to baseline and has not felt any confusion after.    The way patient's neuroma was discovered, patient stated that she had an earache and whenever she was talking on the phone on the left side it sounded like chipmunks to her.  Patient states that she has had 25 treatments of radiation for the neuroma.  Her last updated MRI was in 2023.  At this time, patient denies any worsening tinnitus or hearing loss.    Today, patient states that she is feeling much better and her symptoms have not re-occurred. She denies any neurological symptoms at this time.  She denies having any prior episodes of speech difficulties.  She states that she has never had any strokes prior.  In regards to the documented aspirin allergy, patient states that she is always been aspirin as a little child until Tylenol came out.  Has never had an allergic reaction to aspirin.    There are no other questions or concerns at this time.      Inpatient consult to Neurology  Consult performed by: Denise Ray DO  Consult ordered by: Stanton Moss MD        Historical Information   Past Medical History:   Diagnosis Date    Acoustic neuroma (HCC)     left ear, decreased hearing    Arthritis     right  knee    Disease of thyroid gland     History of radiation therapy 2007    Left acoustic neuroma    Hyperlipidemia     Trigeminal neuralgia of left side of face     Urethral hypermobility     Uterine prolapse      Past Surgical History:   Procedure Laterality Date    COLONOSCOPY      WA CMBND ANTERPOST COLPORRAPHY W/CYSTO N/A 1/8/2019    Procedure: A&P COLPORRHAPHY;  Surgeon: Baron Coyle MD;  Location: [facility];  Service: UroGynecology           WA COLPOPEXY VAGINAL EXTRAPERITONEAL APPROACH N/A 1/8/2019    Procedure: ANTERIOR VE COLPOPEXY;  Surgeon: Baron Coyle MD;  Location[facility];  Service: UroGynecology           WA CYSTOURETHROSCOPY N/A 1/8/2019    Procedure: CYSTOSCOPY;  Surgeon: Baron Coyle MD;  [facility];  Service: UroGynecology           WA SLING OPERATION STRESS INCONTINENCE N/A 1/8/2019    Procedure: PUBOVAGINAL SLING;  Surgeon: Baron Coyle MD;  Location: [facility];  Service: UroGynecology           TUBAL LIGATION      WISDOM TOOTH EXTRACTION       Social History   Social History     Substance and Sexual Activity   Alcohol Use Yes    Comment: rare     Social History     Substance and Sexual Activity   Drug Use No     E-Cigarette/Vaping    E-Cigarette Use Never User      E-Cigarette/Vaping Substances    Nicotine No     THC No     CBD No     Flavoring No     Other No     Unknown No      Social History     Tobacco Use   Smoking Status Never   Smokeless Tobacco Never     Family History:   Family History   Problem Relation Age of Onset    Arthritis Family     Diabetes Family     Hypertension Family     Osteoporosis Family     Kidney disease Family     Thyroid disease Family     Hypertension Mother     Diabetes Father     Asthma Father     Lung disease Father     Thyroid disease Sister     Thyroid disease Daughter     Mental illness Neg Hx     Substance Abuse Neg Hx     Alcohol abuse Neg Hx     Depression Neg Hx      Meds/Allergies   all current active meds have been  "reviewed  Allergies   Allergen Reactions    Erythromycin Rash    Carbamazepine Rash       Review of previous medical records was completed.       Review of Systems   HENT:  Positive for hearing loss (At baseline) and tinnitus (At baseline).    Eyes:  Negative for visual disturbance.   Neurological:  Positive for facial asymmetry and speech difficulty. Negative for dizziness, tremors, seizures, syncope, weakness, light-headedness, numbness and headaches.   Psychiatric/Behavioral:  Negative for confusion.        OBJECTIVE     Patient ID: Krupa Baxter is a 74 y.o. female.    Vitals:   Vitals:    24 2145 24 2215 24 2230 24 0756   BP: 169/82 (!) 180/112 (!) 172/86 147/83   BP Location:   Right arm    Pulse: 77 79 79 72   Resp: 20   16   Temp:    (!) 97.3 °F (36.3 °C)   TempSrc:       SpO2: 95% 95% 95% 94%   Weight:    63.5 kg (140 lb)   Height:    5' 2.5\" (1.588 m)      Body mass index is 25.2 kg/m².     Intake/Output Summary (Last 24 hours) at 2024 1050  Last data filed at 2024 0601  Gross per 24 hour   Intake 0 ml   Output 0 ml   Net 0 ml       Temperature:   Temp (24hrs), Av.8 °F (36.6 °C), Min:97.3 °F (36.3 °C), Max:98.3 °F (36.8 °C)    Temperature: (!) 97.3 °F (36.3 °C)    Invasive Devices:   Invasive Devices       Peripheral Intravenous Line  Duration             Peripheral IV 24 Proximal;Right;Ventral (anterior) Forearm <1 day                    Physical Exam  Vitals reviewed.   Constitutional:       Appearance: Normal appearance.   HENT:      Head: Normocephalic and atraumatic.   Eyes:      Extraocular Movements: Extraocular movements intact and EOM normal.      Conjunctiva/sclera: Conjunctivae normal.      Pupils: Pupils are equal, round, and reactive to light.   Cardiovascular:      Rate and Rhythm: Normal rate.   Pulmonary:      Effort: Pulmonary effort is normal.   Musculoskeletal:         General: Normal range of motion.   Skin:     General: Skin is warm. "   Neurological:      Mental Status: She is alert and oriented to person, place, and time.      Motor: Motor strength is normal.     Coordination: Finger-Nose-Finger Test and Heel to Shin Test normal.      Deep Tendon Reflexes:      Reflex Scores:       Tricep reflexes are 2+ on the right side and 2+ on the left side.       Bicep reflexes are 2+ on the right side and 2+ on the left side.       Brachioradialis reflexes are 2+ on the right side and 2+ on the left side.       Patellar reflexes are 2+ on the right side and 2+ on the left side.       Achilles reflexes are 2+ on the right side and 2+ on the left side.  Psychiatric:         Mood and Affect: Mood normal.         Speech: Speech normal.         Behavior: Behavior normal.          Neurologic Exam     Mental Status   Oriented to person, place, and time.   Attention: normal. Concentration: normal.   Speech: speech is normal   Level of consciousness: alert  Able to name object. Able to repeat. Normal comprehension.     Cranial Nerves     CN II   Right visual field deficit: none  Left visual field deficit: none     CN III, IV, VI   Pupils are equal, round, and reactive to light.  Extraocular motions are normal.     CN V   Facial sensation intact.     CN VII   Facial expression full, symmetric.     CN VIII   CN VIII normal.     CN IX, X   CN IX normal.   CN X normal.     CN XI   CN XI normal.     CN XII   CN XII normal.     Motor Exam   Right arm pronator drift: absent  Left arm pronator drift: absent    Strength   Strength 5/5 throughout.     Sensory Exam   Light touch normal.     Gait, Coordination, and Reflexes     Coordination   Finger to nose coordination: normal  Heel to shin coordination: normal    Reflexes   Right brachioradialis: 2+  Left brachioradialis: 2+  Right biceps: 2+  Left biceps: 2+  Right triceps: 2+  Left triceps: 2+  Right patellar: 2+  Left patellar: 2+  Right achilles: 2+  Left achilles: 2+      LABORATORY DATA     Labs: I have personally  reviewed pertinent reports.    Results from last 7 days   Lab Units 09/04/24  0546 09/03/24 2021   WBC Thousand/uL 9.37 10.33*   HEMOGLOBIN g/dL 12.9 13.4   HEMATOCRIT % 40.0 41.2   PLATELETS Thousands/uL 250 264      Results from last 7 days   Lab Units 09/04/24  0546 09/03/24 2021   POTASSIUM mmol/L 3.9 4.1   CHLORIDE mmol/L 106 104   CO2 mmol/L 29 30   BUN mg/dL 17 23   CREATININE mg/dL 0.78 1.06   CALCIUM mg/dL 9.0 9.3   ALK PHOS U/L 58  --    ALT U/L 11  --    AST U/L 17  --               Results from last 7 days   Lab Units 09/03/24 2021   INR  0.92   PTT seconds 24               IMAGING & DIAGNOSTIC TESTING     Radiology Results: I have personally reviewed pertinent reports.   and I have personally reviewed pertinent films in PACS  CTA stroke alert (head/neck)   Final Result by Guido Turner MD (09/03 2042)      Negative CTA head and neck for large vessel occlusion, dissection, or high-grade stenosis.      0.3 cm aneurysm in left ICA supraclinoid segment projecting inferomedially 0.2 cm aneurysm in right ICA supraclinoid segment projecting inferomedially. Recommend consultation with the [facility], a division of [facility]    at [phone].      Partially imaged mildly enhancing left vestibular schwannoma.      Additional chronic/incidental findings as detailed above.               Findings were directly discussed with Jose Maria Burciaga at approximately 8:33 p.m. on 9/3/2024.      Workstation performed: [id number]         CT stroke alert brain   Final Result by Guido Turner MD (09/03 2033)      No acute intracranial abnormality.      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      Known left vestibular schwannoma is better evaluated on MRI brain with and without contrast 4/2/2023.      Findings were directly discussed with Jose Maria Burciaga at approximately 8:32 p.m. on 9/3/2024.      Workstation performed: [id number]         MRI Inpatient Order    (Results  Pending)       Other Diagnostic Testing: I have personally reviewed pertinent reports.      ACTIVE MEDICATIONS     Current Facility-Administered Medications   Medication Dose Route Frequency    acetaminophen (TYLENOL) tablet 650 mg  650 mg Oral Q6H PRN    aspirin chewable tablet 81 mg  81 mg Oral Daily    atorvastatin (LIPITOR) tablet 40 mg  40 mg Oral Daily    clopidogrel (PLAVIX) tablet 75 mg  75 mg Oral Daily    heparin (porcine) subcutaneous injection 5,000 Units  5,000 Units Subcutaneous Q8H PETRA    levothyroxine tablet 88 mcg  88 mcg Oral Early Morning    melatonin tablet 3 mg  3 mg Oral HS PRN    multivitamin-minerals (CENTRUM) tablet 1 tablet  1 tablet Oral Daily    OXcarbazepine (TRILEPTAL) tablet 150 mg  150 mg Oral BID    senna-docusate sodium (SENOKOT S) 8.6-50 mg per tablet 2 tablet  2 tablet Oral HS PRN       Prior to Admission medications    Medication Sig Start Date End Date Taking? Authorizing Provider   acetaminophen (TYLENOL) 500 mg tablet Take 1,000 mg by mouth every 6 (six) hours as needed for mild pain   Yes Historical Provider, MD   atorvastatin (LIPITOR) 20 mg tablet TAKE 1 TABLET BY MOUTH ONCE  DAILY 8/24/24  Yes ELAYNE Dominguez   ibuprofen (MOTRIN) 200 mg tablet Take 200 mg by mouth every 6 (six) hours as needed for mild pain   Yes Historical Provider, MD   levothyroxine 88 mcg tablet TAKE 1 TABLET BY MOUTH DAILY 8/24/24  Yes ELAYNE Dominguez   Multiple Vitamin (MULTIVITAMIN) tablet Take 1 tablet by mouth daily   Yes Historical Provider, MD   OXcarbazepine (TRILEPTAL) 150 mg tablet TAKE 1 TABLET BY MOUTH TWICE A DAY 8/6/24  Yes ELAYNE Dominguez       CODE STATUS & ADVANCED DIRECTIVES     Code Status: Level 1 - Full Code  Advance Directive and Living Will:      Power of :    POLST:        VTE Pharmacologic Prophylaxis:  As per primary team  VTE Mechanical Prophylaxis: sequential compression device and foot pump applied    ======    I have discussed the patient's  history, physical exam findings, assessment, and plan in detail with attending, Dr. Benjamin.     Thank you for allowing me to participate in the care of your patient, Krupa Baxter.    Denise Ray DO  [facility] Neurology Residency, PGY-3

## 2024-09-04 NOTE — ED PROVIDER NOTES
History  Chief Complaint   Patient presents with    CVA/TIA-like Symptoms     Per  they were watching TV and pt looked at him like something was wrong around 2000.  reports pt had obvious right side facial droop and had difficulty speaking. Pt then went to stand and seemed unsteady.  assisted pt to the car and brought her to ED. Pt has right side facial droop and difficulty speaking in triage        CVA/TIA-like Symptoms  Presenting symptoms: confusion (resolving)     Pt is a 75 y/o female presenting to the ED w/ stroke like symptoms consisting of right sided facial droop, ams, and difficulty ambulating. Symptom onset at 20:00 witnessed by  while sitting on couch watching TV, she had word-finding difficulty with noticeable right-sided facial droop. When  tried to get her to the car she was stumbling and confused. He reports she is close to baseline currently, has been improving over the past 15 minutes. Hx of acoustic neuroma (left), trigeminal neuralgia (left), hyperlipidemia, chronic ruq pain secondary to gallbladder dysfunction. Aspirin allergy, no anticoagulation.    Prior to Admission Medications   Prescriptions Last Dose Informant Patient Reported? Taking?   Multiple Vitamin (MULTIVITAMIN) tablet  Self Yes No   Sig: Take 1 tablet by mouth daily   OXcarbazepine (TRILEPTAL) 150 mg tablet   No No   Sig: TAKE 1 TABLET BY MOUTH TWICE A DAY   atorvastatin (LIPITOR) 20 mg tablet   No No   Sig: TAKE 1 TABLET BY MOUTH ONCE  DAILY   levothyroxine 88 mcg tablet   No No   Sig: TAKE 1 TABLET BY MOUTH DAILY      Facility-Administered Medications: None       Past Medical History:   Diagnosis Date    Acoustic neuroma (HCC)     left ear, decreased hearing    Arthritis     right knee    Disease of thyroid gland     History of radiation therapy 2007    Left acoustic neuroma    Hyperlipidemia     Trigeminal neuralgia of left side of face     Urethral hypermobility     Uterine prolapse         Past Surgical History:   Procedure Laterality Date    COLONOSCOPY      LA CMBND ANTERPOST COLPORRAPHY W/CYSTO N/A 1/8/2019    Procedure: A&P COLPORRHAPHY;  Surgeon: Baron Coyle MD;  [facility];  Service: UroGynecology           LA COLPOPEXY VAGINAL EXTRAPERITONEAL APPROACH N/A 1/8/2019    Procedure: ANTERIOR VE COLPOPEXY;  Surgeon: Baron Coyle MD;  [facility];  Service: UroGynecology           LA CYSTOURETHROSCOPY N/A 1/8/2019    Procedure: CYSTOSCOPY;  Surgeon: Baron Coyle MD;  [facility];  Service: UroGynecology           LA SLING OPERATION STRESS INCONTINENCE N/A 1/8/2019    Procedure: PUBOVAGINAL SLING;  Surgeon: Baron Coyle MD;  [facility];  Service: UroGynecology           TUBAL LIGATION      WISDOM TOOTH EXTRACTION         Family History   Problem Relation Age of Onset    Arthritis Family     Diabetes Family     Hypertension Family     Osteoporosis Family     Kidney disease Family     Thyroid disease Family     Hypertension Mother     Diabetes Father     Asthma Father     Lung disease Father     Thyroid disease Sister     Thyroid disease Daughter     Mental illness Neg Hx     Substance Abuse Neg Hx     Alcohol abuse Neg Hx     Depression Neg Hx      I have reviewed and agree with the history as documented.    E-Cigarette/Vaping    E-Cigarette Use Never User      E-Cigarette/Vaping Substances    Nicotine No     THC No     CBD No     Flavoring No     Other No     Unknown No      Social History     Tobacco Use    Smoking status: Never    Smokeless tobacco: Never   Vaping Use    Vaping status: Never Used   Substance Use Topics    Alcohol use: Yes     Comment: rare    Drug use: No        Review of Systems   Neurological:  Positive for facial asymmetry (right sided, improving).   Psychiatric/Behavioral:  Positive for confusion (resolving).    All other systems reviewed and are negative.      Physical Exam  ED Triage Vitals   Temperature Pulse  Respirations Blood Pressure SpO2   09/03/24 2132 09/03/24 2015 09/03/24 2015 09/03/24 2015 09/03/24 2015   98.3 °F (36.8 °C) 81 18 (!) 203/83 94 %      Temp Source Heart Rate Source Patient Position - Orthostatic VS BP Location FiO2 (%)   09/03/24 2132 09/03/24 2015 09/03/24 2040 09/03/24 2015 --   Oral Monitor Sitting Right arm       Pain Score       --                    Orthostatic Vital Signs  Vitals:    09/03/24 2040 09/03/24 2100 09/03/24 2115 09/03/24 2130   BP: (!) 171/79 (!) 174/76 (!) 186/77 (!) 180/78   Pulse: 80 77 78 83   Patient Position - Orthostatic VS: Sitting Sitting Sitting Sitting       Physical Exam  Vitals and nursing note reviewed.   Constitutional:       General: She is in acute distress (anxious).      Appearance: She is not ill-appearing, toxic-appearing or diaphoretic.   HENT:      Head: Atraumatic.      Nose: Nose normal.      Mouth/Throat:      Mouth: Mucous membranes are moist.      Pharynx: Oropharynx is clear.   Eyes:      Extraocular Movements: Extraocular movements intact.      Conjunctiva/sclera: Conjunctivae normal.      Pupils: Pupils are equal, round, and reactive to light.   Cardiovascular:      Rate and Rhythm: Normal rate and regular rhythm.      Pulses: Normal pulses.      Heart sounds: Normal heart sounds.   Pulmonary:      Effort: Pulmonary effort is normal. No respiratory distress.      Breath sounds: Normal breath sounds. No stridor. No wheezing, rhonchi or rales.   Chest:      Chest wall: No tenderness.   Abdominal:      General: Abdomen is flat. There is no distension.      Palpations: Abdomen is soft.      Tenderness: There is no abdominal tenderness. There is no guarding or rebound.   Musculoskeletal:         General: No swelling, tenderness, deformity or signs of injury. Normal range of motion.      Cervical back: Normal range of motion and neck supple. No rigidity or tenderness.      Right lower leg: No edema.      Left lower leg: No edema.   Skin:     General:  Skin is warm and dry.      Coloration: Skin is not jaundiced or pale.      Findings: No bruising, erythema, lesion or rash.   Neurological:      Mental Status: She is alert and oriented to person, place, and time.      Cranial Nerves: Cranial nerve deficit present.      Sensory: No sensory deficit.      Coordination: Coordination normal.   Psychiatric:         Behavior: Behavior normal.         ED Medications  Medications   iohexol (OMNIPAQUE) 350 MG/ML injection (MULTI-DOSE) 85 mL (85 mL Intravenous Given 9/3/24 2024)   clopidogrel (PLAVIX) tablet 300 mg (300 mg Oral Given 9/3/24 2154)       Diagnostic Studies  Results Reviewed       Procedure Component Value Units Date/Time    FLU/RSV/COVID - if FLU/RSV clinically relevant [[id number]]  (Normal) Collected: 09/03/24 2032    Lab Status: Final result Specimen: Nares from Nose Updated: 09/03/24 2121     SARS-CoV-2 Negative     INFLUENZA A PCR Negative     INFLUENZA B PCR Negative     RSV PCR Negative    Narrative:      This test has been performed using the CoV-2/Flu/RSV plus assay on the [employer] GeneXpert platform. This test has been validated by the  and verified by the performing laboratory.     This test is designed to amplify and detect the following: nucleocapsid (N), envelope (E), and RNA-dependent RNA polymerase (RdRP) genes of the SARS-CoV-2 genome; matrix (M), basic polymerase (PB2), and acidic protein (PA) segments of the influenza A genome; matrix (M) and non-structural protein (NS) segments of the influenza B genome, and the nucleocapsid genes of RSV A and RSV B.     Positive results are indicative of the presence of Flu A, Flu B, RSV, and/or SARS-CoV-2 RNA. Positive results for SARS-CoV-2 or suspected novel influenza should be reported to state, local, or federal health departments according to local reporting requirements.      All results should be assessed in conjunction with clinical presentation and other laboratory markers for clinical  management.     FOR PEDIATRIC PATIENTS - copy/paste COVID Guidelines URL to browser: [email or web address]       HS Troponin I 2hr [[id number]]     Lab Status: No result Specimen: Blood     HS Troponin 0hr (reflex protocol) [[id number]]  (Normal) Collected: 09/03/24 2021    Lab Status: Final result Specimen: Blood from Arm, Right Updated: 09/03/24 2058     hs TnI 0hr 4 ng/L     Protime-INR [[id number]]  (Normal) Collected: 09/03/24 2021    Lab Status: Final result Specimen: Blood from Arm, Right Updated: 09/03/24 2054     Protime 13.0 seconds      INR 0.92    Narrative:      INR Therapeutic Range    Indication                                             INR Range      Atrial Fibrillation                                               2.0-3.0  Hypercoagulable State                                    2.0.2.3  Left Ventricular Asist Device                            2.0-3.0  Mechanical Heart Valve                                  -    Aortic(with afib, MI, embolism, HF, LA enlargement,    and/or coagulopathy)                                     2.0-3.0 (2.5-3.5)     Mitral                                                             2.5-3.5  Prosthetic/Bioprosthetic Heart Valve               2.0-3.0  Venous thromboembolism (VTE: VT, PE        2.0-3.0    APTT [[id number]]  (Normal) Collected: 09/03/24 2021    Lab Status: Final result Specimen: Blood from Arm, Right Updated: 09/03/24 2054     PTT 24 seconds     Basic metabolic panel [[id number]] Collected: 09/03/24 2021    Lab Status: Final result Specimen: Blood from Arm, Right Updated: 09/03/24 2051     Sodium 140 mmol/L      Potassium 4.1 mmol/L      Chloride 104 mmol/L      CO2 30 mmol/L      ANION GAP 6 mmol/L      BUN 23 mg/dL      Creatinine 1.06 mg/dL      Glucose 114 mg/dL      Calcium 9.3 mg/dL      eGFR 52 ml/min/1.73sq m     Narrative:      National Kidney Disease Foundation guidelines for Chronic Kidney Disease  (CKD):     Stage 1 with normal or high GFR (GFR > 90 mL/min/1.73 square meters)    Stage 2 Mild CKD (GFR = 60-89 mL/min/1.73 square meters)    Stage 3A Moderate CKD (GFR = 45-59 mL/min/1.73 square meters)    Stage 3B Moderate CKD (GFR = 30-44 mL/min/1.73 square meters)    Stage 4 Severe CKD (GFR = 15-29 mL/min/1.73 square meters)    Stage 5 End Stage CKD (GFR <15 mL/min/1.73 square meters)  Note: GFR calculation is accurate only with a steady state creatinine    CBC and Platelet [[id number]]  (Abnormal) Collected: 09/03/24 2021    Lab Status: Final result Specimen: Blood from Arm, Right Updated: 09/03/24 2032     WBC 10.33 Thousand/uL      RBC 4.22 Million/uL      Hemoglobin 13.4 g/dL      Hematocrit 41.2 %      MCV 98 fL      MCH 31.8 pg      MCHC 32.5 g/dL      RDW 13.3 %      Platelets 264 Thousands/uL      MPV 10.0 fL     Fingerstick Glucose (POCT) [[id number]]  (Normal) Collected: 09/03/24 2014    Lab Status: Final result Specimen: Blood Updated: 09/03/24 2015     POC Glucose 127 mg/dl                    CTA stroke alert (head/neck)   Final Result by Guido Turner MD (09/03 2042)      Negative CTA head and neck for large vessel occlusion, dissection, or high-grade stenosis.      0.3 cm aneurysm in left ICA supraclinoid segment projecting inferomedially 0.2 cm aneurysm in right ICA supraclinoid segment projecting inferomedially. Recommend consultation with the [facility], [facility] of [facility]    at [phone].      Partially imaged mildly enhancing left vestibular schwannoma.      Additional chronic/incidental findings as detailed above.               Findings were directly discussed with Jose Maria Burciaga at approximately 8:33 p.m. on 9/3/2024.      Workstation performed: [id number]         CT stroke alert brain   Final Result by Guido Turner MD (09/03 2033)      No acute intracranial abnormality.      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      Known  left vestibular schwannoma is better evaluated on MRI brain with and without contrast 4/2/2023.      Findings were directly discussed with Jose Maria Burciaga at approximately 8:32 p.m. on 9/3/2024.      Workstation performed: [id number]               Procedures  ECG 12 Lead Documentation Only    Date/Time: 9/3/2024 8:16 PM    Performed by: Ollie Jones DO  Authorized by: Ollie Jones DO    ECG reviewed by me, the ED Provider: yes    Patient location:  ED  Previous ECG:     Previous ECG:  Compared to current    Comparison ECG info:  12/24/18    Similarity:  No change    Comparison to cardiac monitor: Yes    Interpretation:     Interpretation: normal    Rate:     ECG rate:  84    ECG rate assessment: normal    Rhythm:     Rhythm: sinus rhythm    Ectopy:     Ectopy: none    QRS:     QRS axis:  Normal    QRS intervals:  Normal  Conduction:     Conduction: normal    ST segments:     ST segments:  Normal  T waves:     T waves: normal    Comments:      No STEMI.        ED Course  ED Course as of 09/03/24 2159 Tue Sep 03, 2024   2019 Stroke alert called, suspect TIA, sxs onset at 2000 witnessed by . NIH of 1. No blood thinners, no trauma. Was initially confused but alert and oriented now. Hemodynamically stable.   2157 D/W SLIM for admission. Hemodynamically stable, no acute distress, currently feels at her baseline. Stroke like symptoms resolving without tnk. She and her  are agreeable to plan.                                       Medical Decision Making  DDx including but not limited to:  CVA, TIA, ICH, hypertensive emergency/urgency, metabolic abnormality, cardiac etiology, atypical migraine, seizure, smitha's paralysis, tumor.      Pt is a 73 y/o female presenting to the ED w/ stroke like symptoms consisting of right sided facial droop, ams, and difficulty ambulating. Symptom onset at 20:00 witnessed by  while sitting on couch watching TV, she had word-finding difficulty with  noticeable right-sided facial droop. When  tried to get her to the car she was stumbling and confused. He reports she is close to baseline currently, has been improving over the past 15 minutes. Hx of acoustic neuroma (left), trigeminal neuralgia (left), hyperlipidemia, chronic ruq pain secondary to gallbladder dysfunction. Aspirin allergy, no anticoagulation.    Stroke alert called, suspect TIA, sxs onset at 2000 witnessed by . NIH of 1. No blood thinners, no trauma. Was initially confused but alert and oriented now. Hemodynamically stable.  D/W SLIM for admission. Hemodynamically stable, no acute distress, currently feels at her baseline. Stroke like symptoms resolving without tnk. She and her  are agreeable to plan.      Amount and/or Complexity of Data Reviewed  Labs: ordered.  Radiology: ordered.    Risk  Prescription drug management.          Disposition  Final diagnoses:   Stroke-like symptoms - resolving   Facial droop - right     Time reflects when diagnosis was documented in both MDM as applicable and the Disposition within this note       Time User Action Codes Description Comment    9/3/2024  8:18 PM Ollie Jones Add [R29.90] Stroke-like symptoms     9/3/2024  9:54 PM Ollie Jones Add [R29.810] Facial droop     9/3/2024  9:54 PM Ollie Jones Modify [R29.810] Facial droop right    9/3/2024  9:55 PM Ollie Jones Modify [R29.90] Stroke-like symptoms resolving          ED Disposition       ED Disposition   Admit    Condition   Stable    Date/Time   Tue Sep 3, 2024  9:54 PM    Comment   Case was discussed with Oksana HOGAN and the patient's admission status was agreed to be Admission Status: observation status to the service of Dr. Zamora.               Follow-up Information    None         Patient's Medications   Discharge Prescriptions    No medications on file     No discharge procedures on file.    PDMP Review       None             ED  Provider  Attending physically available and evaluated Krupa Baxter. I managed the patient along with the ED Attending.    Electronically Signed by           Ollie Jones DO  09/03/24 9829

## 2024-09-04 NOTE — ASSESSMENT & PLAN NOTE
Worsening pain over the last few weeks  Outpatient work up   RUQ US: no stones  HIDA: EF 22%  Referred to surgery as outpt

## 2024-09-04 NOTE — SPEECH THERAPY NOTE
Speech Language/Pathology    Speech/Language Pathology Stroke pathway Screen     Patient Name: Krupa Baxter  Today's Date: 9/4/2024     Problem List  Principal Problem:    Stroke-like symptoms  Active Problems:    Essential hypertension    Hypercholesterolemia    Trigeminal neuralgia of left side of face    Dysfunctional gallbladder       Past Medical History  Past Medical History:   Diagnosis Date    Acoustic neuroma (HCC)     left ear, decreased hearing    Arthritis     right knee    Disease of thyroid gland     History of radiation therapy 2007    Left acoustic neuroma    Hyperlipidemia     Trigeminal neuralgia of left side of face     Urethral hypermobility     Uterine prolapse           Krupa Baxter is a 74 y.o. female with a PMH of HLD, hypothyroidism, trigeminal neuralgia, vertigo, left acoustic neuroma, gallbladder dysfunction who presents with right facial weaknesss, aphasia, confusion, difficulty walking.  Symptoms resolved currently.     Consult received for speech/swallow eval on stroke pathway. Pt passed nsg swallow screen; tolerating regular diet w/o s/s dysphagia or aspiration. No speech/language deficits reported.   NIH score is 2    MRI: pending   CT-head: 9/3/24  No acute intracranial abnormality. Known left vestibular schwannoma is better evaluated on MRI brain with and without contrast 4/2/2023     No need for formal speech/swallow eval at this time. Reconsult if needed.      Trisha Hook MA CCC-SLP  Speech Pathologist  [place] license # [id number]  [place] license # [id number]  Available via Secure Chat

## 2024-09-04 NOTE — CASE MANAGEMENT
Case Management Assessment & Discharge Planning Note    Patient name Krupa Baxter  Location S /S -01 MRN [id number]  : 1950 Date 2024       Current Admission Date: 9/3/2024  Current Admission Diagnosis:Stroke-like symptoms   Patient Active Problem List    Diagnosis Date Noted Date Diagnosed    Stroke-like symptoms 2024     Dysfunctional gallbladder 2024     Forgetfulness 2022     Chronic RUQ pain 2021     Diarrhea 2021     Trigeminal neuralgia of left side of face 2021     Benign paroxysmal positional vertigo 2020     Overweight (BMI 25.0-29.9) 2019     Essential hypertension 2019     Primary osteoarthritis involving multiple joints 2019     Vitamin D deficiency 2019     Prediabetes 2019     Left acoustic neuroma (HCC) 2019     Other specified disorders of bladder 2014     Incomplete uterovaginal prolapse 2013     Hypercholesterolemia 2013     Hypothyroidism 2013       LOS (days): 0  Geometric Mean LOS (GMLOS) (days):   Days to GMLOS:     OBJECTIVE:              Current admission status: Observation       Preferred Pharmacy:   [facility]/pharmacy #[id number] - [place], [place] - [address]  [address] [address]  Phone: [phone] Fax: [phone]    [facility] - [address] [address]  Phone: [phone] Fax: [phone]    [facility] - [place], [place] - [address]  [address]  [facility] [address]  [facility] [address]  Phone: [phone] Fax: [phone]    Primary Care Provider: ELAYNE Dominguez    Primary Insurance: MEDICARE  Secondary Insurance: [employer]    ASSESSMENT:  Active Health Care Proxies    There are no active Health Care Proxies on file.                 Readmission Root Cause  30 Day Readmission:  No    Patient Information  Admitted from:: Home  Mental Status: Alert  During Assessment patient was accompanied by: Not accompanied during assessment  Assessment information provided by:: Patient  Primary Caregiver: Self  Support Systems: Self, Spouse/significant other  County of Residence: [place]  What city do you live in?: [place]  Home entry access options. Select all that apply.: Stairs  Number of steps to enter home.: 3  Type of Current Residence: 2 story home  Upon entering residence, is there a bedroom on the main floor (no further steps)?: No  A bedroom is located on the following floor levels of residence (select all that apply):: 2nd Floor  Upon entering residence, is there a bathroom on the main floor (no further steps)?: Yes (Half bath)  Number of steps to 2nd floor from main floor: One Flight  Living Arrangements: Lives w/ Spouse/significant other    Activities of Daily Living Prior to Admission  Functional Status: Independent  Completes ADLs independently?: Yes  Ambulates independently?: Yes  Does patient use assisted devices?: No  Does patient currently own DME?: Yes  What DME does the patient currently own?: Straight Cane, Rollator  Does patient have a history of Outpatient Therapy (PT/OT)?: Yes  Does the patient have a history of Short-Term Rehab?: No  Does patient have a history of HHC?: No  Does patient currently have HHC?: No         Patient Information Continued  Income Source: Pension/senior care  Does patient have prescription coverage?: Yes  Does patient receive dialysis treatments?: No  Does patient have a history of substance abuse?: No  Does patient have a history of Mental Health Diagnosis?: No    PHQ 2/9 Screening   Reviewed PHQ 2/9 Depression Screening Score?: No    Means of Transportation  Means of Transport to Appts:: Drives Self      Social Determinants of Health (SDOH)      Flowsheet Row Most Recent Value   Housing Stability    In the last 12 months, was there a time when you  were not able to pay the mortgage or rent on time? N   In the past 12 months, how many times have you moved where you were living? 0   At any time in the past 12 months, were you homeless or living in a shelter (including now)? N   Transportation Needs    In the past 12 months, has lack of transportation kept you from medical appointments or from getting medications? no   In the past 12 months, has lack of transportation kept you from meetings, work, or from getting things needed for daily living? No   Food Insecurity    Within the past 12 months, you worried that your food would run out before you got the money to buy more. Never true   Within the past 12 months, the food you bought just didn't last and you didn't have money to get more. Never true   Utilities    In the past 12 months has the electric, gas, oil, or water company threatened to shut off services in your home? No            DISCHARGE DETAILS:    Discharge planning discussed with:: Patient  Freedom of Choice: Yes  Comments - Freedom of Choice: Return home  CM contacted family/caregiver?: No- see comments (Pt A&O)  Were Treatment Team discharge recommendations reviewed with patient/caregiver?: Yes  Did patient/caregiver verbalize understanding of patient care needs?: Yes  Were patient/caregiver advised of the risks associated with not following Treatment Team discharge recommendations?: Yes    Contacts  Patient Contacts: Krupa  Relationship to Patient:: Other (Comment) (Self)  Contact Method: In Person    Requested Home Health Care         Is the patient interested in HHC at discharge?: No    DME Referral Provided  Referral made for DME?: No    Other Referral/Resources/Interventions Provided:  Interventions: Other (Specify)  Referral Comments: CM spoke with pt at bedside, introduced self and role with discharge planning. Pt reported she lives with her spouse in a 2 story home with 3 CAIO. Pt reported there is a half bath on the main level, bull  bath/bedrooms upstairs. Pt reported being fully IPTA, driving self to appointments. Pt reported her preferred pharmacy is [facility] and PCP is ELAYNE lEias. Pt reported her spouse is her POA. No further CM needs noted. CM will remain available.    Would you like to participate in our [facility] service program?  : No - Declined    Treatment Team Recommendation: Home  Discharge Destination Plan:: Home  Transport at Discharge : Family

## 2024-09-04 NOTE — PROGRESS NOTES
[facility]  Progress Note  Name: Krupa Baxter I  MRN: [id number]  Unit/Bed#: S [facility] I Date of Admission: 9/3/2024   Date of Service: 9/4/2024 I Hospital Day: 0    Assessment & Plan   Dysfunctional gallbladder  Assessment & Plan  Worsening pain over the last few weeks  Outpatient work up   RUQ US: no stones  HIDA: EF 22%  Referred to surgery as outpt       Trigeminal neuralgia of left side of face  Assessment & Plan  Maintained on trileptal     Hypercholesterolemia  Assessment & Plan  PTA: atorvastatin 20 mg  Increase to 40 mg,      Essential hypertension  Assessment & Plan  200/80 on arrival.  Not on anti hypertensive at home  Permissive hypertension first 24-48 hours    PLAN:  Consider amlodipine on discharge    * Stroke-like symptoms  Assessment & Plan  Right facial weakness, aphasia, confusion, difficulty walking.  Symptoms resolved currently  CTH: negative.  Known vestibular schwannoma.  CTA no LVO or significant stenosis.  Small left and right ICA aneurysms  Loaded with plavix, and aspirin   Echo: The left ventricular ejection fraction is 60%. Systolic function is normal. Wall motion is normal. Diastolic function is mildly abnormal, consistent with grade I (abnormal) relaxation.     PLAN:  Continue aspirin 81 Mg  Continue Plavix  Continue atorvastatin  Pending MRI                 VTE Pharmacologic Prophylaxis: VTE Score: 7 High Risk (Score >/= 5) - Pharmacological DVT Prophylaxis Ordered: heparin. Sequential Compression Devices Ordered.    Mobility:   JH-HLM Achieved: 7: Walk 25 feet or more  JH-HLM Goal achieved. Continue to encourage appropriate mobility.    Patient Centered Rounds: I performed bedside rounds with nursing staff today.   Discussions with Specialists or Other Care Team Provider: Neurology      Total Time Spent on Date of Encounter in care of patient: 30 mins. This time was spent on one or more of the following: performing physical exam; counseling and  coordination of care; obtaining or reviewing history; documenting in the medical record; reviewing/ordering tests, medications or procedures; communicating with other healthcare professionals and discussing with patient's family/caregivers.    Current Length of Stay: 0 day(s)  Current Patient Status: Observation   Certification Statement: The patient will continue to require additional inpatient hospital stay due to pending imaging results  Discharge Plan: Anticipate discharge tomorrow to home.    Code Status: Level 1 - Full Code    Subjective:   Patient is alert, oriented to time place and person.  Patient denies any focal neurological deficits.  Patient denies any chest pain, shortness of breath or any other concerning symptoms.    Objective:     Vitals:   Temp (24hrs), Av.9 °F (36.6 °C), Min:97.3 °F (36.3 °C), Max:98.3 °F (36.8 °C)    Temp:  [97.3 °F (36.3 °C)-98.3 °F (36.8 °C)] 98.2 °F (36.8 °C)  HR:  [72-83] 77  Resp:  [16-20] 18  BP: (128-203)/() 128/77  SpO2:  [92 %-99 %] 92 %  Body mass index is 25.2 kg/m².     Input and Output Summary (last 24 hours):     Intake/Output Summary (Last 24 hours) at 2024 1539  Last data filed at 2024 1300  Gross per 24 hour   Intake 480 ml   Output 0 ml   Net 480 ml       Physical Exam:   Physical Exam  Constitutional:       Appearance: Normal appearance.   HENT:      Head: Normocephalic and atraumatic.      Right Ear: External ear normal.      Left Ear: External ear normal.      Nose: Nose normal.      Mouth/Throat:      Pharynx: Oropharynx is clear.   Eyes:      General: No visual field deficit.     Extraocular Movements: Extraocular movements intact.      Conjunctiva/sclera: Conjunctivae normal.      Pupils: Pupils are equal, round, and reactive to light.   Cardiovascular:      Rate and Rhythm: Normal rate and regular rhythm.      Pulses: Normal pulses.      Heart sounds: Normal heart sounds.   Pulmonary:      Effort: Pulmonary effort is normal.      Breath  sounds: Normal breath sounds.   Abdominal:      General: Bowel sounds are normal.      Palpations: Abdomen is soft.   Musculoskeletal:         General: Normal range of motion.   Skin:     General: Skin is warm.      Capillary Refill: Capillary refill takes less than 2 seconds.   Neurological:      General: No focal deficit present.      Mental Status: She is alert and oriented to person, place, and time.      Cranial Nerves: No cranial nerve deficit, dysarthria or facial asymmetry.      Sensory: Sensation is intact. No sensory deficit.      Motor: Motor function is intact. No weakness.      Coordination: Coordination is intact.          Additional Data:     Labs:  Results from last 7 days   Lab Units 09/04/24  0546   WBC Thousand/uL 9.37   HEMOGLOBIN g/dL 12.9   HEMATOCRIT % 40.0   PLATELETS Thousands/uL 250     Results from last 7 days   Lab Units 09/04/24  0546   SODIUM mmol/L 141   POTASSIUM mmol/L 3.9   CHLORIDE mmol/L 106   CO2 mmol/L 29   BUN mg/dL 17   CREATININE mg/dL 0.78   ANION GAP mmol/L 6   CALCIUM mg/dL 9.0   ALBUMIN g/dL 3.8   TOTAL BILIRUBIN mg/dL 0.52   ALK PHOS U/L 58   ALT U/L 11   AST U/L 17   GLUCOSE RANDOM mg/dL 93     Results from last 7 days   Lab Units 09/03/24 2021   INR  0.92     Results from last 7 days   Lab Units 09/03/24 2014   POC GLUCOSE mg/dl 127     Results from last 7 days   Lab Units 09/03/24 2021   HEMOGLOBIN A1C % 6.1*           Lines/Drains:  Invasive Devices       Peripheral Intravenous Line  Duration             Peripheral IV 09/03/24 Proximal;Right;Ventral (anterior) Forearm <1 day                      Telemetry:  Telemetry Orders (From admission, onward)               24 Hour Telemetry Monitoring  Continuous x 24 Hours (Telem)        Question:  Reason for 24 Hour Telemetry  Answer:  TIA/Suspected CVA/ Confirmed CVA                     Telemetry Reviewed: Normal Sinus Rhythm  Indication for Continued Telemetry Use: No indication for continued use. Will discontinue.               Imaging: Reviewed radiology reports from this admission including: CT head    Recent Cultures (last 7 days):         Last 24 Hours Medication List:   Current Facility-Administered Medications   Medication Dose Route Frequency Provider Last Rate    acetaminophen  650 mg Oral Q6H PRN Oksana Mcdaniel, ELAYNE      aspirin  81 mg Oral Daily Oksana Jonas, CRNP      atorvastatin  40 mg Oral Daily Oksana Lorrik, CRNP      clopidogrel  75 mg Oral Daily Oksana Lorrik, STEVENNP      heparin (porcine)  5,000 Units Subcutaneous Q8H PETRA Oksana Mcdaniel, ELAYNE      levothyroxine  88 mcg Oral Early Morning Oksana Mcdaniel, STEVENNP      melatonin  3 mg Oral HS PRN Oksana Mcdaniel, ELAYNE      multivitamin-minerals  1 tablet Oral Daily Oksana Mcdaniel, ELAYNE      OXcarbazepine  150 mg Oral BID Oksana Mcdaniel, ELAYNE      senna-docusate sodium  2 tablet Oral HS PRN Oksana Mcdaniel, ELAYNE          Today, Patient Was Seen By: Hailey Otto MD    **Please Note: This note may have been constructed using a voice recognition system.**

## 2024-09-04 NOTE — H&P
Count includes the [facility]  H&P  Name: Krupa Baxter 74 y.o. female I MRN: [id number]  Unit/Bed#: S -[facility] I Date of Admission: 9/3/2024   Date of Service: 9/3/2024 I Hospital Day: 0      Assessment & Plan   * Stroke-like symptoms  Assessment & Plan  Right facial weakness, aphasia, confusion, difficulty walking.  Symptoms resolved currently  CTH: negative.  Known vestibular schwannoma.  CTA no LVO or significant stenosis.  Small left and right ICA aneurysms  Loaded with plavix, continue 75 mg qd  Reported ASA allergy is not accurate.  Patient denies any allergy to aspirin and has tolerated in the past.   Load ASA now and continue 81 mg qd  Appreciate neurology    Dysfunctional gallbladder  Assessment & Plan  Worsening pain over the last few weeks  Outpatient work up   RUQ US: no stones  HIDA: EF 22%  Referred to surgery as outpt   CMP in AM    Trigeminal neuralgia of left side of face  Assessment & Plan  Maintained on trileptal     Hypercholesterolemia  Assessment & Plan  PTA: atorvastatin 20 mg  Increase to 40 mg, pending MRI      Essential hypertension  Assessment & Plan  200/80 on arrival.  Not on anti hypertensive at home  Permissive hypertension first 24-48 hours         VTE Pharmacologic Prophylaxis: VTE Score: 7 High Risk (Score >/= 5) - Pharmacological DVT Prophylaxis Ordered: heparin. Sequential Compression Devices Ordered.  Code Status: Level 1 - Full Code   Discussion with family: Updated  () at bedside.    Anticipated Length of Stay: Patient will be admitted on an observation basis with an anticipated length of stay of less than 2 midnights secondary to stroke work up.    Total Time Spent on Date of Encounter in care of patient:  mins. This time was spent on one or more of the following: performing physical exam; counseling and coordination of care; obtaining or reviewing history; documenting in the medical record; reviewing/ordering tests, medications or procedures;  communicating with other healthcare professionals and discussing with patient's family/caregivers.    Chief Complaint: right facial weakness, aphasia, confusion, difficulty walking    History of Present Illness:  Krupa Baxter is a 74 y.o. female with a PMH of HLD, hypothyroidism, trigeminal neuralgia, vertigo, left acoustic neuroma, gallbladder dysfunction who presents with right facial weaknesss, aphasia, confusion, difficulty walking.  Symptoms resolved currently.  The ER reported patient has allergy to aspirin so she was given plavix load only.  Reviewed allergies with patient and she denies aaspirin allergy.  She has taken aspirin in the past without difficulty.  Will give 324 aspirin now and start low dose tomorrow in additional to plavix.      Notes ongoing gallbladder pain worsening over the past few weeks.  Has outpatiente appointment with gen surg coming up.    Review of Systems:  Review of Systems   Musculoskeletal:  Positive for gait problem.   Neurological:  Positive for facial asymmetry, speech difficulty and weakness.   All other systems reviewed and are negative.      Past Medical and Surgical History:   Past Medical History:   Diagnosis Date    Acoustic neuroma (HCC)     left ear, decreased hearing    Arthritis     right knee    Disease of thyroid gland     History of radiation therapy 2007    Left acoustic neuroma    Hyperlipidemia     Trigeminal neuralgia of left side of face     Urethral hypermobility     Uterine prolapse        Past Surgical History:   Procedure Laterality Date    COLONOSCOPY      MO CMBND ANTERPOST COLPORRAPHY W/CYSTO N/A 1/8/2019    Procedure: A&P COLPORRHAPHY;  Surgeon: Baron Coyle MD;  [facility];  Service: UroGynecology           MO COLPOPEXY VAGINAL EXTRAPERITONEAL APPROACH N/A 1/8/2019    Procedure: ANTERIOR VE COLPOPEXY;  Surgeon: Baron Coyle MD;  [facility];  Service: UroGynecology           MO CYSTOURETHROSCOPY N/A 1/8/2019     Procedure: CYSTOSCOPY;  Surgeon: Baron Coyle MD;  [facility];  Service: UroGynecology           [place] SLING OPERATION STRESS INCONTINENCE N/A 1/8/2019    Procedure: PUBOVAGINAL SLING;  Surgeon: Baron Coyle MD;  [facility];  Service: UroGynecology           TUBAL LIGATION      WISDOM TOOTH EXTRACTION         Meds/Allergies:  Prior to Admission medications    Medication Sig Start Date End Date Taking? Authorizing Provider   acetaminophen (TYLENOL) 500 mg tablet Take 1,000 mg by mouth every 6 (six) hours as needed for mild pain   Yes Historical Provider, MD   atorvastatin (LIPITOR) 20 mg tablet TAKE 1 TABLET BY MOUTH ONCE  DAILY 8/24/24  Yes ELAYNE Dominguez   ibuprofen (MOTRIN) 200 mg tablet Take 200 mg by mouth every 6 (six) hours as needed for mild pain   Yes Historical Provider, MD   levothyroxine 88 mcg tablet TAKE 1 TABLET BY MOUTH DAILY 8/24/24  Yes ELAYNE Dominguez   Multiple Vitamin (MULTIVITAMIN) tablet Take 1 tablet by mouth daily   Yes Historical Provider, MD   OXcarbazepine (TRILEPTAL) 150 mg tablet TAKE 1 TABLET BY MOUTH TWICE A DAY 8/6/24  Yes ELAYNE Dominguez     I have reviewed home medications with patient personally.    Allergies:   Allergies   Allergen Reactions    Erythromycin Rash    Carbamazepine Rash       Social History:  Marital Status: /Civil Union   Occupation:   Patient Pre-hospital Living Situation: Home  Patient Pre-hospital Level of Mobility: walks  Patient Pre-hospital Diet Restrictions: \Substance Use History:   Social History     Substance and Sexual Activity   Alcohol Use Yes    Comment: rare     Social History     Tobacco Use   Smoking Status Never   Smokeless Tobacco Never     Social History     Substance and Sexual Activity   Drug Use No       Family History:  Family History   Problem Relation Age of Onset    Arthritis Family     Diabetes Family     Hypertension Family     Osteoporosis Family     Kidney disease Family      Thyroid disease Family     Hypertension Mother     Diabetes Father     Asthma Father     Lung disease Father     Thyroid disease Sister     Thyroid disease Daughter     Mental illness Neg Hx     Substance Abuse Neg Hx     Alcohol abuse Neg Hx     Depression Neg Hx        Physical Exam:     Vitals:   Blood Pressure: (!) 172/86 (09/03/24 2230)  Pulse: 79 (09/03/24 2230)  Temperature: 98.3 °F (36.8 °C) (09/03/24 2132)  Temp Source: Oral (09/03/24 2132)  Respirations: 20 (09/03/24 2145)  Weight - Scale: 63.8 kg (140 lb 10.5 oz) (09/03/24 2027)  SpO2: 95 % (09/03/24 2230)    Physical Exam  Constitutional:       Appearance: Normal appearance.   HENT:      Head: Normocephalic and atraumatic.      Right Ear: External ear normal.      Left Ear: External ear normal.      Nose: Nose normal.      Mouth/Throat:      Pharynx: Oropharynx is clear.   Eyes:      Extraocular Movements: Extraocular movements intact.      Conjunctiva/sclera: Conjunctivae normal.   Cardiovascular:      Rate and Rhythm: Normal rate and regular rhythm.      Pulses: Normal pulses.      Heart sounds: Murmur heard.   Pulmonary:      Effort: Pulmonary effort is normal.      Breath sounds: Normal breath sounds.   Abdominal:      General: Bowel sounds are normal.      Palpations: Abdomen is soft.   Musculoskeletal:         General: Normal range of motion.      Cervical back: Normal range of motion.   Skin:     General: Skin is warm.      Capillary Refill: Capillary refill takes less than 2 seconds.   Neurological:      General: No focal deficit present.      Mental Status: She is alert and oriented to person, place, and time.      Sensory: No sensory deficit.      Motor: No weakness.      Coordination: Coordination normal.   Psychiatric:         Mood and Affect: Mood normal.         Behavior: Behavior normal.        Additional Data:     Lab Results:  Results from last 7 days   Lab Units 09/03/24 2021   WBC Thousand/uL 10.33*   HEMOGLOBIN g/dL 13.4   HEMATOCRIT  % 41.2   PLATELETS Thousands/uL 264     Results from last 7 days   Lab Units 09/03/24 2021   SODIUM mmol/L 140   POTASSIUM mmol/L 4.1   CHLORIDE mmol/L 104   CO2 mmol/L 30   BUN mg/dL 23   CREATININE mg/dL 1.06   ANION GAP mmol/L 6   CALCIUM mg/dL 9.3   GLUCOSE RANDOM mg/dL 114     Results from last 7 days   Lab Units 09/03/24 2021   INR  0.92     Results from last 7 days   Lab Units 09/03/24 2014   POC GLUCOSE mg/dl 127     Lab Results   Component Value Date    HGBA1C 5.8 (H) 03/15/2024    HGBA1C 5.7 (H) 09/08/2023    HGBA1C 5.8 (H) 03/11/2023           Lines/Drains:  Invasive Devices       Peripheral Intravenous Line  Duration             Peripheral IV 09/03/24 Proximal;Right;Ventral (anterior) Forearm <1 day                        Imaging: Reviewed radiology reports from this admission including: CT head  CTA stroke alert (head/neck)   Final Result by Guido Turner MD (09/03 2042)      Negative CTA head and neck for large vessel occlusion, dissection, or high-grade stenosis.      0.3 cm aneurysm in left ICA supraclinoid segment projecting inferomedially 0.2 cm aneurysm in right ICA supraclinoid segment projecting inferomedially. Recommend consultation with the [facility], a [facility] of [facility]    at [phone].      Partially imaged mildly enhancing left vestibular schwannoma.      Additional chronic/incidental findings as detailed above.               Findings were directly discussed with Jose Maria Burciaga at approximately 8:33 p.m. on 9/3/2024.      Workstation performed: [id number]         CT stroke alert brain   Final Result by Guido Turner MD (09/03 2033)      No acute intracranial abnormality.      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      Known left vestibular schwannoma is better evaluated on MRI brain with and without contrast 4/2/2023.      Findings were directly discussed with Jose Maria Burciaga at approximately 8:32 p.m. on 9/3/2024.       Workstation performed: [id number]             EKG and Other Studies Reviewed on Admission:   EKG: Personally Reviewed. NSR. HR 84.    ** Please Note: This note has been constructed using a voice recognition system. **

## 2024-09-04 NOTE — DISCHARGE INSTR - AVS FIRST PAGE
Dear Krupa Baxter,     It was our pleasure to care for you here at [facility].  It is our hope that we were always able to exceed the expected standards for your care during your stay.  You were hospitalized due to stroke-like symtoms.  You were cared for on the third floor by Hailey Otto MD under the service of Luz Guzman MD with the [facility] who covers for your primary care physician (PCP), ELAYNE Dominguez, while you were hospitalized.  If you have any questions or concerns related to this hospitalization, you may contact us at .  For follow up as well as any medication refills, we recommend that you follow up with your primary care physician.  A registered nurse will reach out to you by phone within a few days after your discharge to answer any additional questions that you may have after going home.  However, at this time we provide for you here, the most important instructions / recommendations at discharge:     Notable Medication Adjustments -   Please start taking aspirin 81 mg daily indefinitely starting tomorrow.   Please start taking atorvastatin 40 mg daily.  Please continue taking all other medications as prescribed.   Testing Required after Discharge -   An order for electroencephalogram (EEG) has been placed. Please get it done outpatient.   Important follow up information -   Please visit your primary care physician within one week.  Ambulatory referral to neurology provided. Please call [phone] if you do not hear from them.   Other Instructions -   Please monitor your blood pressure at home.  We hope you feel better soon. If your symptoms worsen, please visit the emergency department.   Please review this entire after visit summary as additional general instructions including medication list, appointments, activity, diet, any pertinent wound care, and other additional recommendations from your  care team that may be provided for you.      Sincerely,     Hailey Otto MD

## 2024-09-04 NOTE — ASSESSMENT & PLAN NOTE
Right facial weakness, aphasia, confusion, difficulty walking.  Symptoms resolved currently  CTH: negative.  Known vestibular schwannoma.  CTA no LVO or significant stenosis.  Small left and right ICA aneurysms  Loaded with plavix, continue 75 mg qd  Reported ASA allergy is not accurate.  Patient denies any allergy to aspirin and has tolerated in the past.   Load ASA now and continue 81 mg qd  Appreciate neurology

## 2024-09-04 NOTE — PHYSICAL THERAPY NOTE
Physical Therapy Screen    Patient Name: Krupa Baxter    Today's Date: 9/4/2024     Problem List  Principal Problem:    Stroke-like symptoms  Active Problems:    Essential hypertension    Hypercholesterolemia    Trigeminal neuralgia of left side of face    Dysfunctional gallbladder       Past Medical History  Past Medical History:   Diagnosis Date    Acoustic neuroma (HCC)     left ear, decreased hearing    Arthritis     right knee    Disease of thyroid gland     History of radiation therapy 2007    Left acoustic neuroma    Hyperlipidemia     Trigeminal neuralgia of left side of face     Urethral hypermobility     Uterine prolapse         Past Surgical History  Past Surgical History:   Procedure Laterality Date    COLONOSCOPY      MS CMBND ANTERPOST COLPORRAPHY W/CYSTO N/A 1/8/2019    Procedure: A&P COLPORRHAPHY;  Surgeon: Baron Coyle MD;  [facility];  Service: UroGynecology           MS COLPOPEXY VAGINAL EXTRAPERITONEAL APPROACH N/A 1/8/2019    Procedure: ANTERIOR VE COLPOPEXY;  Surgeon: Baron Coyle MD;  [facility];  Service: UroGynecology           MS CYSTOURETHROSCOPY N/A 1/8/2019    Procedure: CYSTOSCOPY;  Surgeon: Baron Coyle MD;  [facility];  Service: UroGynecology           MS SLING OPERATION STRESS INCONTINENCE N/A 1/8/2019    Procedure: PUBOVAGINAL SLING;  Surgeon: Baron Coyle MD;  [facility];  Service: UroGynecology           TUBAL LIGATION      WISDOM TOOTH EXTRACTION           09/04/24 1031   Note Type   Note type Screen   Additional Comments Pt orders recieved. Chart reviewed. PT introduced self and role to pt. At this time, pt states she has been ambulating back and forth to the bathroom within the room without AD. Pt denies cocnerns over her mobility status, stating symptoms that caused her to seek care have now resolved. She feels she is at her mobility baseline. Given this, pt will  be a screen from PT caseload. Please place new orders if there is a change in status.     Horacio Gomez, PT

## 2024-09-04 NOTE — TELEPHONE ENCOUNTER
Patient went to [place] with possible TIA.  She is waiting to have MRI done and wanted Deya Hodge to know.  Thank you.

## 2024-09-05 ENCOUNTER — TELEPHONE (OUTPATIENT)
Age: 74
End: 2024-09-05

## 2024-09-05 ENCOUNTER — TELEPHONE (OUTPATIENT)
Dept: INTERNAL MEDICINE CLINIC | Facility: CLINIC | Age: 74
End: 2024-09-05

## 2024-09-05 ENCOUNTER — TELEPHONE (OUTPATIENT)
Dept: NEUROLOGY | Facility: CLINIC | Age: 74
End: 2024-09-05

## 2024-09-05 VITALS
RESPIRATION RATE: 18 BRPM | BODY MASS INDEX: 24.8 KG/M2 | WEIGHT: 140 LBS | HEART RATE: 78 BPM | HEIGHT: 63 IN | SYSTOLIC BLOOD PRESSURE: 151 MMHG | TEMPERATURE: 98.1 F | OXYGEN SATURATION: 94 % | DIASTOLIC BLOOD PRESSURE: 80 MMHG

## 2024-09-05 LAB
ANION GAP SERPL CALCULATED.3IONS-SCNC: 7 MMOL/L (ref 4–13)
BUN SERPL-MCNC: 21 MG/DL (ref 5–25)
CALCIUM SERPL-MCNC: 8.9 MG/DL (ref 8.4–10.2)
CHLORIDE SERPL-SCNC: 105 MMOL/L (ref 96–108)
CO2 SERPL-SCNC: 29 MMOL/L (ref 21–32)
CREAT SERPL-MCNC: 0.82 MG/DL (ref 0.6–1.3)
ERYTHROCYTE [DISTWIDTH] IN BLOOD BY AUTOMATED COUNT: 13.6 % (ref 11.6–15.1)
GFR SERPL CREATININE-BSD FRML MDRD: 71 ML/MIN/1.73SQ M
GLUCOSE SERPL-MCNC: 101 MG/DL (ref 65–140)
HCT VFR BLD AUTO: 38.1 % (ref 34.8–46.1)
HGB BLD-MCNC: 12.4 G/DL (ref 11.5–15.4)
MCH RBC QN AUTO: 31.6 PG (ref 26.8–34.3)
MCHC RBC AUTO-ENTMCNC: 32.5 G/DL (ref 31.4–37.4)
MCV RBC AUTO: 97 FL (ref 82–98)
PLATELET # BLD AUTO: 252 THOUSANDS/UL (ref 149–390)
PMV BLD AUTO: 10.8 FL (ref 8.9–12.7)
POTASSIUM SERPL-SCNC: 3.8 MMOL/L (ref 3.5–5.3)
RBC # BLD AUTO: 3.92 MILLION/UL (ref 3.81–5.12)
SODIUM SERPL-SCNC: 141 MMOL/L (ref 135–147)
WBC # BLD AUTO: 9.44 THOUSAND/UL (ref 4.31–10.16)

## 2024-09-05 PROCEDURE — 80048 BASIC METABOLIC PNL TOTAL CA: CPT

## 2024-09-05 PROCEDURE — 85027 COMPLETE CBC AUTOMATED: CPT

## 2024-09-05 PROCEDURE — 99239 HOSP IP/OBS DSCHRG MGMT >30: CPT | Performed by: INTERNAL MEDICINE

## 2024-09-05 PROCEDURE — 99232 SBSQ HOSP IP/OBS MODERATE 35: CPT | Performed by: PSYCHIATRY & NEUROLOGY

## 2024-09-05 RX ORDER — ATORVASTATIN CALCIUM 40 MG/1
40 TABLET, FILM COATED ORAL DAILY
Qty: 30 TABLET | Refills: 0 | Status: SHIPPED | OUTPATIENT
Start: 2024-09-06 | End: 2024-10-31 | Stop reason: SDUPTHER

## 2024-09-05 RX ORDER — ASPIRIN 81 MG/1
81 TABLET, CHEWABLE ORAL DAILY
Qty: 30 TABLET | Refills: 0 | Status: SHIPPED | OUTPATIENT
Start: 2024-09-05 | End: 2024-10-31

## 2024-09-05 RX ADMIN — ACETAMINOPHEN 650 MG: 325 TABLET, FILM COATED ORAL at 04:49

## 2024-09-05 RX ADMIN — CLOPIDOGREL BISULFATE 75 MG: 75 TABLET ORAL at 08:07

## 2024-09-05 RX ADMIN — OXCARBAZEPINE 150 MG: 150 TABLET, FILM COATED ORAL at 08:07

## 2024-09-05 RX ADMIN — ASPIRIN 81 MG 81 MG: 81 TABLET ORAL at 08:07

## 2024-09-05 RX ADMIN — LEVOTHYROXINE SODIUM 88 MCG: 88 TABLET ORAL at 04:52

## 2024-09-05 RX ADMIN — ATORVASTATIN CALCIUM 40 MG: 40 TABLET, FILM COATED ORAL at 08:07

## 2024-09-05 NOTE — DISCHARGE SUMMARY
[facility]  Discharge- Krupa Baxter 1950, 74 y.o. female MRN: [id number]  Unit/Bed#: S -[facility] Encounter: [id number]  Primary Care Provider: ELAYNE Dominguez   Date and time admitted to hospital: 9/3/2024  8:07 PM    * Stroke-like symptoms  Assessment & Plan  Right facial weakness, aphasia, confusion, difficulty walking.  Symptoms resolved currently  CTH: negative.  Known vestibular schwannoma.  CTA no LVO or significant stenosis.  Small left and right ICA aneurysms  Loaded with plavix, and aspirin   Echo: The left ventricular ejection fraction is 60%. Systolic function is normal. Wall motion is normal. Diastolic function is mildly abnormal, consistent with grade I (abnormal) relaxation.   MRI:  1. No acute infarct identified.  2. Hemosiderin staining along the sulci of the left superior frontal lobe. There are also scattered areas of peripherally located remote blood products. These appear new/increased in comparison to prior exams, however technical differences limit the   comparison. No acute blood products seen    PLAN:  Continue aspirin 81 Mg  Continue atorvastatin  Follow up with neurology outpatient       Essential hypertension  Assessment & Plan  200/80 on arrival.  Not on anti hypertensive at home  Permissive hypertension first 24-48 hours    PLAN:  Monitor blood pressure at home    Dysfunctional gallbladder  Assessment & Plan  Worsening pain over the last few weeks  Outpatient work up   RUQ US: no stones  HIDA: EF 22%  Referred to surgery as outpt       Trigeminal neuralgia of left side of face  Assessment & Plan  Maintained on trileptal     Hypercholesterolemia  Assessment & Plan  PTA: atorvastatin 20 mg    Plan  Increase to 40 mg        Medical Problems       Resolved Problems  Date Reviewed: 9/4/2024   None       Discharging Resident: Hailey Otto MD  Discharging Attending: No att. providers found  PCP: ELAYNE Dominguez  Admission Date:   Admission  Orders (From admission, onward)       Ordered        09/04/24 1620  INPATIENT ADMISSION  Once            09/03/24 2155  Place in Observation  Once                          Discharge Date: 09/05/24    Consultations During Hospital Stay:  Neurology     Procedures Performed:   None     Significant Findings / Test Results:   MRI brain wo contrast    Result Date: 9/4/2024  Impression: 1. No acute infarct identified. 2. Hemosiderin staining along the sulci of the left superior frontal lobe. There are also scattered areas of peripherally located remote blood products. These appear new/increased in comparison to prior exams, however technical differences limit the comparison. No acute blood products seen. Correlate for history of trauma, and recommend consultation with the neurovascular Center given the patient's history of aneurysm. Workstation performed: [id number]     CTA stroke alert (head/neck)    Result Date: 9/3/2024  Impression: Negative CTA head and neck for large vessel occlusion, dissection, or high-grade stenosis. 0.3 cm aneurysm in left ICA supraclinoid segment projecting inferomedially 0.2 cm aneurysm in right ICA supraclinoid segment projecting inferomedially. Recommend consultation with the [facility], a division of [facility]  at [phone]. Partially imaged mildly enhancing left vestibular schwannoma. Additional chronic/incidental findings as detailed above. Findings were directly discussed with Jose Maria Burciaga at approximately 8:33 p.m. on 9/3/2024. Workstation performed: [id number]     CT stroke alert brain    Result Date: 9/3/2024  Impression: No acute intracranial abnormality. 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 Known left vestibular schwannoma is better evaluated on MRI brain with and without contrast 4/2/2023. Findings were directly discussed with Jose Maria Burciaga at approximately 8:32 p.m. on 9/3/2024. Workstation performed: [id number]       No Chest XR results  available for this patient.      Incidental Findings:   CTA stroke alert (head/neck):  Negative CTA head and neck for large vessel occlusion, dissection, or high-grade stenosis. 0.3 cm aneurysm in left ICA supraclinoid segment projecting inferomedially 0.2 cm aneurysm in right ICA supraclinoid segment projecting inferomedially. Recommend consultation with the [facility], a division of [facility]      MRI brain wo contrast:  1. No acute infarct identified. 2. Hemosiderin staining along the sulci of the left superior frontal lobe. There are also scattered areas of peripherally located remote blood products. These appear new/increased in comparison to prior exams, however technical differences limit the comparison. No acute blood products seen. Correlate for history of trauma, and recommend consultation with the neurovascular Center given the patient's history of aneurysm.        Test Results Pending at Discharge (will require follow up):  None     Outpatient Tests Requested:  EEG    Complications:  None    Reason for Admission: Stoke-like symptoms    Hospital Course:   Krupa Baxter is a 74 y.o. female patient who originally presented to the hospital on 9/3/2024 due to strokelike symptoms.  CTh done in the ED was negative for acute changes and redemonstrated known acoustic neuroma.  CTA head and neck showed an incidental finding of small left and right internal carotid artery aneurysms.  She was hypertensive on presentation to the ED.  She was started on aspirin and Plavix and was followed by neurology throughout hospital stay.  She was placed on stroke protocol and monitored.  She also reported gallbladder pain for which she has an outpatient surgery appointment scheduled.  Marais was ordered which showed no acute infarct but showed an incidental finding of hemosiderin staining along the sulcus of the left superior frontal lobe and scattered areas of peripherally located remote  "blood products.  These appear to new/increased in comparison to prior exams and neurology recommended consultation outpatient.  Echo reveals a ejection fraction of 60% with grade 1 diastolic dysfunction.  Since being admitted to the floor the patient remained asymptomatic.  On the day of discharge patient was medically stable and was discharged with instructions to continue aspirin indefinitely.  Her atorvastatin dose was also increased to 40 and she was asked to get an EEG done as well as follow-up with neurology outpatient.  Education about blood pressure was also provided and she was told to monitor her pressure at home.        Please see above list of diagnoses and related plan for additional information.     Condition at Discharge: stable    Discharge Day Visit / Exam:   Subjective: No acute events overnight.  Patient was examined at bedside.  She reports feeling good and denied any headache, vision changes, weakness, facial droop and numbness.  She endorses 5-10/10 lateral abdominal and back pain from her gallbladder dysfunction.  She also reported a bruise at the site of her heparin injection.  Vitals: Blood Pressure: 151/80 (09/05/24 0731)  Pulse: 78 (09/05/24 0731)  Temperature: 98.1 °F (36.7 °C) (09/05/24 0731)  Temp Source: Oral (09/03/24 2132)  Respirations: 18 (09/04/24 1518)  Height: 5' 2.5\" (158.8 cm) (09/04/24 0756)  Weight - Scale: 63.5 kg (140 lb) (09/04/24 0756)  SpO2: 94 % (09/05/24 0731)  Exam:   Physical Exam  Constitutional:       General: She is not in acute distress.     Appearance: She is not ill-appearing.   HENT:      Head: Normocephalic and atraumatic.      Nose: No congestion or rhinorrhea.      Mouth/Throat:      Mouth: Mucous membranes are moist.   Eyes:      General: No scleral icterus.     Extraocular Movements: Extraocular movements intact.      Conjunctiva/sclera: Conjunctivae normal.   Cardiovascular:      Rate and Rhythm: Normal rate and regular rhythm.      Pulses: Normal " pulses.      Heart sounds: Normal heart sounds.   Pulmonary:      Effort: Pulmonary effort is normal. No respiratory distress.      Breath sounds: Normal breath sounds.   Abdominal:      General: Bowel sounds are normal.      Tenderness: There is abdominal tenderness in the right upper quadrant.   Musculoskeletal:         General: No swelling.      Cervical back: Normal range of motion.      Thoracic back: Tenderness present.      Right lower leg: No edema.      Left lower leg: No edema.      Comments: Right lateral abdominal wall/RUQ and back pain   Skin:     General: Skin is warm and dry.      Coloration: Skin is not jaundiced.   Neurological:      General: No focal deficit present.      Mental Status: She is alert and oriented to person, place, and time. Mental status is at baseline.      Cranial Nerves: No cranial nerve deficit.      Sensory: No sensory deficit.      Motor: No weakness.   Psychiatric:         Mood and Affect: Mood normal.         Behavior: Behavior normal.         Thought Content: Thought content normal.          Discussion with Family: Updated  () at bedside.    Discharge instructions/Information to patient and family:   See after visit summary for information provided to patient and family.      Provisions for Follow-Up Care:  See after visit summary for information related to follow-up care and any pertinent home health orders.      Mobility at time of Discharge:   Basic Mobility Inpatient Raw Score: 24  JH-HLM Goal: 8: Walk 250 feet or more  JH-HLM Achieved: 7: Walk 25 feet or more  HLM Goal NOT achieved. Continue to encourage mobility in post discharge setting.     Disposition:   Home    Planned Readmission: No    Discharge Medications:  See after visit summary for reconciled discharge medications provided to patient and/or family.      **Please Note: This note may have been constructed using a voice recognition system**

## 2024-09-05 NOTE — TELEPHONE ENCOUNTER
Please call and see what question she has about her EEG.   Did she have it done in the hospital or after discharge? I do not see any results.

## 2024-09-05 NOTE — PLAN OF CARE
Problem: PAIN - ADULT  Goal: Verbalizes/displays adequate comfort level or baseline comfort level  Description: Interventions:  - Encourage patient to monitor pain and request assistance  - Assess pain using appropriate pain scale  - Administer analgesics based on type and severity of pain and evaluate response  - Implement non-pharmacological measures as appropriate and evaluate response  - Consider cultural and social influences on pain and pain management  - Notify physician/advanced practitioner if interventions unsuccessful or patient reports new pain  Outcome: Progressing     Problem: INFECTION - ADULT  Goal: Absence or prevention of progression during hospitalization  Description: INTERVENTIONS:  - Assess and monitor for signs and symptoms of infection  - Monitor lab/diagnostic results  - Monitor all insertion sites, i.e. indwelling lines, tubes, and drains  - Monitor endotracheal if appropriate and nasal secretions for changes in amount and color  - [place] appropriate cooling/warming therapies per order  - Administer medications as ordered  - Instruct and encourage patient and family to use good hand hygiene technique  - Identify and instruct in appropriate isolation precautions for identified infection/condition  Outcome: Progressing  Goal: Absence of fever/infection during neutropenic period  Description: INTERVENTIONS:  - Monitor WBC    Outcome: Progressing     Problem: SAFETY ADULT  Goal: Patient will remain free of falls  Description: INTERVENTIONS:  - Educate patient/family on patient safety including physical limitations  - Instruct patient to call for assistance with activity   - Consult OT/PT to assist with strengthening/mobility   - Keep Call bell within reach  - Keep bed low and locked with side rails adjusted as appropriate  - Keep care items and personal belongings within reach  - Initiate and maintain comfort rounds  - Make Fall Risk Sign visible to staff  - Offer Toileting every 2 Hours,  in advance of need  - Apply yellow socks and bracelet for high fall risk patients  - Consider moving patient to room near nurses station  Outcome: Progressing  Goal: Maintain or return to baseline ADL function  Description: INTERVENTIONS:  -  Assess patient's ability to carry out ADLs; assess patient's baseline for ADL function and identify physical deficits which impact ability to perform ADLs (bathing, care of mouth/teeth, toileting, grooming, dressing, etc.)  - Assess/evaluate cause of self-care deficits   - Assess range of motion  - Assess patient's mobility; develop plan if impaired  - Assess patient's need for assistive devices and provide as appropriate  - Encourage maximum independence but intervene and supervise when necessary  - Involve family in performance of ADLs  - Assess for home care needs following discharge   - Consider OT consult to assist with ADL evaluation and planning for discharge  - Provide patient education as appropriate  Outcome: Progressing  Goal: Maintains/Returns to pre admission functional level  Description: INTERVENTIONS:  - Perform AM-PAC 6 Click Basic Mobility/ Daily Activity assessment daily.  - Set and communicate daily mobility goal to care team and patient/family/caregiver.   - Collaborate with rehabilitation services on mobility goals if consulted  - Perform Range of Motion 3 times a day.  - Reposition patient every 2 hours.  - Dangle patient 3 times a day  - Stand patient 3 times a day  - Ambulate patient 3 times a day  - Out of bed to chair 3 times a day   - Out of bed for meals 3 times a day  - Out of bed for toileting  - Record patient progress and toleration of activity level   Outcome: Progressing     Problem: DISCHARGE PLANNING  Goal: Discharge to home or other facility with appropriate resources  Description: INTERVENTIONS:  - Identify barriers to discharge w/patient and caregiver  - Arrange for needed discharge resources and transportation as appropriate  - Identify  discharge learning needs (meds, wound care, etc.)  - Arrange for interpretive services to assist at discharge as needed  - Refer to Case Management Department for coordinating discharge planning if the patient needs post-hospital services based on physician/advanced practitioner order or complex needs related to functional status, cognitive ability, or social support system  Outcome: Progressing     Problem: Knowledge Deficit  Goal: Patient/family/caregiver demonstrates understanding of disease process, treatment plan, medications, and discharge instructions  Description: Complete learning assessment and assess knowledge base.  Interventions:  - Provide teaching at level of understanding  - Provide teaching via preferred learning methods  Outcome: Progressing

## 2024-09-05 NOTE — ASSESSMENT & PLAN NOTE
Right facial weakness, aphasia, confusion, difficulty walking.  Symptoms resolved currently  CTH: negative.  Known vestibular schwannoma.  CTA no LVO or significant stenosis.  Small left and right ICA aneurysms  Loaded with plavix, and aspirin   Echo: The left ventricular ejection fraction is 60%. Systolic function is normal. Wall motion is normal. Diastolic function is mildly abnormal, consistent with grade I (abnormal) relaxation.   MRI:  1. No acute infarct identified.  2. Hemosiderin staining along the sulci of the left superior frontal lobe. There are also scattered areas of peripherally located remote blood products. These appear new/increased in comparison to prior exams, however technical differences limit the   comparison. No acute blood products seen    PLAN:  Continue aspirin 81 Mg  Continue atorvastatin  Follow up with neurology outpatient

## 2024-09-05 NOTE — ASSESSMENT & PLAN NOTE
200/80 on arrival.  Not on anti hypertensive at home  Permissive hypertension first 24-48 hours    PLAN:  Monitor blood pressure at home

## 2024-09-05 NOTE — PROGRESS NOTES
NEUROLOGY RESIDENCY PROGRESS NOTE     Name: Krupa Baxter   Age & Sex: 74 y.o. female   MRN: [id number]  Unit/Bed#: S -01   Encounter: [id number]    Krupa Baxter will need follow-up in  6-8 weeks  with neurovascular team for TIA in 60 minute appointment. They will require a routine EEG when able before appointment.     Pending for discharge: Clinical Improvement    ASSESSMENT & PLAN     * Stroke-like symptoms  Assessment & Plan  As per patient, she was sitting and watching TV when her  noticed she stopped speaking and she appeared to have facial droop on the right side.  When she was getting to the car, she seemed to be confused and stumbling.    Initial NIHSS 1  Presenting deficits were: R facial droop, speech difficulties, confusion, gait imbalance  Interventions: Patient not a candidate. Symptoms resolved/clearly non disabling.     Workup:  Lab Results   Component Value Date    HGBA1C 6.1 (H) 09/03/2024    CHOLESTEROL 162 09/04/2024    LDLCALC 87 09/04/2024    TRIG 174 (H) 09/04/2024    INR 0.92 09/03/2024      CTH: No acute intracranial abnormality. 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 Known left vestibular schwannoma is better evaluated on MRI brain with and without contrast 4/2/2023.  CTA: Negative CTA head and neck for large vessel occlusion, dissection, or high-grade stenosis. 0.3 cm aneurysm in left ICA supraclinoid segment projecting inferomedially 0.2 cm aneurysm in right ICA supraclinoid segment projecting inferomedially. Recommend consultation with the Neurovascular Center, a division of [facility] at [phone]. Partially imaged mildly enhancing left vestibular schwannoma.   MRI: No acute infarct identified. Hemosiderin staining along the sulci of the left superior frontal lobe. There are also scattered areas of peripherally located remote blood products. These appear new/increased in comparison to prior exams, however technical differences limit  the comparison. No acute blood products seen. Correlate for history of trauma, and recommend consultation with the neurovascular Center given the patient's history of aneurysm.  TTE/ANGIE: Left ventricular cavity size is normal. Wall thickness is normal. The left ventricular ejection fraction is 60%. Systolic function is normal. Wall motion is normal. Diastolic function is mildly abnormal, consistent with grade I (abnormal) relaxation.     Pertinent scores as of 09/05/24:  - NIHSS: 1    Impression: Patient is a 74 year old female who presented to the ED for stroke-like symptoms. Given the quick resolution of symptoms along with patient's risk factors, etiology most likely a high risk TIA given patient's ABCD2 score of 5 versus amyloid spells in the setting of probable CAA (age, clinical history, multiple hemorrhages with no explanation).  Patient should be on an AP and high-intensity statin for stroke prevention. Cannot rule out seizures so obtain routine EEG in the outpatient setting.    Plan:  Case discussed with Dr. Benjamin  Continue AP/AC: Loaded with  mg x1 and Plavix 300 mg x1, maintenance with ASA 81 mg daily only given the possibility of CAA with the MRI findings.  Continue high-intensity statin: Lipitor 40 mg  Routine EEG outpatient to rule out seizures  Telemetry  Recommend stroke risk factor optimization   Healthy diet encouraged  PT/OT versus active exercise discussed  BP management and HTN med compliance discussed, continue normotension, goal SBP <140  Rest of care as per primary care team   No further inpatient recommendations at this time Please contact Neurology with any further questions or concerns   Patient will need outpatient Neurovascular follow-up in 6-8 weeks.         SUBJECTIVE     Patient is a 74-year-old female presented to the ED as result of stroke-like symptoms.  Patient states that she has not had a reoccurrence of her symptoms overnight.  She denies any other neurological  "complaints.      Patient did states she has a bruise due to the heparin shot she was getting.  Patient has no other questions or concerns at this time.  Patient was seen and examined. No acute events overnight.       Review of Systems   Neurological:  Negative for dizziness, tremors, seizures, syncope, facial asymmetry, speech difficulty, weakness, light-headedness, numbness and headaches.   Hematological:  Bruises/bleeds easily (Left lower quadrant due to Heparin shot).     OBJECTIVE     Patient ID: Krupa Baxter is a 74 y.o. female.    Vitals:    24 0756 24 1518 24 2156 24 0731   BP: 147/83 128/77 138/81 151/80   Pulse: 72 77 76 78   Resp: 16 18     Temp: (!) 97.3 °F (36.3 °C) 98.2 °F (36.8 °C) 98.5 °F (36.9 °C) 98.1 °F (36.7 °C)   TempSrc:       SpO2: 94% 92% 95% 94%   Weight: 63.5 kg (140 lb)      Height: 5' 2.5\" (1.588 m)         Temperature:   Temp (24hrs), Av.3 °F (36.8 °C), Min:98.1 °F (36.7 °C), Max:98.5 °F (36.9 °C)    Temperature: 98.1 °F (36.7 °C)      Physical Exam  Vitals reviewed.   HENT:      Head: Normocephalic and atraumatic.   Eyes:      Extraocular Movements: Extraocular movements intact.      Conjunctiva/sclera: Conjunctivae normal.      Pupils: Pupils are equal, round, and reactive to light.   Cardiovascular:      Rate and Rhythm: Normal rate.   Pulmonary:      Effort: Pulmonary effort is normal.   Musculoskeletal:         General: Normal range of motion.   Skin:     General: Skin is warm.   Neurological:      Mental Status: She is oriented to person, place, and time.      Motor: Motor strength is normal.     Coordination: Finger-Nose-Finger Test normal.      Deep Tendon Reflexes:      Reflex Scores:       Tricep reflexes are 2+ on the right side and 2+ on the left side.       Bicep reflexes are 2+ on the right side and 2+ on the left side.       Brachioradialis reflexes are 2+ on the right side and 2+ on the left side.       Patellar reflexes are 2+ on the right " side and 2+ on the left side.       Achilles reflexes are 2+ on the right side and 2+ on the left side.  Psychiatric:         Mood and Affect: Mood normal.         Speech: Speech normal.         Behavior: Behavior normal.          Neurologic Exam     Mental Status   Oriented to person, place, and time.   Attention: normal. Concentration: normal.   Speech: speech is normal   Level of consciousness: alert    Cranial Nerves     CN II   Right visual field deficit: none  Left visual field deficit: none     CN III, IV, VI   Pupils are equal, round, and reactive to light.    CN V   Facial sensation intact.     CN VII   Facial expression full, symmetric.     CN VIII   CN VIII normal.     CN IX, X   CN IX normal.   CN X normal.     CN XI   CN XI normal.     CN XII   CN XII normal.     Motor Exam   Right arm pronator drift: absent  Left arm pronator drift: absent    Strength   Strength 5/5 throughout.     Sensory Exam   Light touch normal.     Gait, Coordination, and Reflexes     Coordination   Finger to nose coordination: normal    Reflexes   Right brachioradialis: 2+  Left brachioradialis: 2+  Right biceps: 2+  Left biceps: 2+  Right triceps: 2+  Left triceps: 2+  Right patellar: 2+  Left patellar: 2+  Right achilles: 2+  Left achilles: 2+      LABORATORY DATA     Labs: I have personally reviewed pertinent reports.    Results from last 7 days   Lab Units 09/05/24  0541 09/04/24 0546 09/03/24 2021   WBC Thousand/uL 9.44 9.37 10.33*   HEMOGLOBIN g/dL 12.4 12.9 13.4   HEMATOCRIT % 38.1 40.0 41.2   PLATELETS Thousands/uL 252 250 264      Results from last 7 days   Lab Units 09/05/24  0541 09/04/24  0546 09/03/24 2021   SODIUM mmol/L 141 141 140   POTASSIUM mmol/L 3.8 3.9 4.1   CHLORIDE mmol/L 105 106 104   CO2 mmol/L 29 29 30   BUN mg/dL 21 17 23   CREATININE mg/dL 0.82 0.78 1.06   CALCIUM mg/dL 8.9 9.0 9.3   ALK PHOS U/L  --  58  --    ALT U/L  --  11  --    AST U/L  --  17  --               Results from last 7 days   Lab  Units 09/03/24 2021   INR  0.92   PTT seconds 24               IMAGING & DIAGNOSTIC TESTING     Radiology Results: I have personally reviewed pertinent reports.   and I have personally reviewed pertinent films in PACS    MRI brain wo contrast   Final Result by Richard Sutherland MD (09/04 2051)      1. No acute infarct identified.   2. Hemosiderin staining along the sulci of the left superior frontal lobe. There are also scattered areas of peripherally located remote blood products. These appear new/increased in comparison to prior exams, however technical differences limit the    comparison. No acute blood products seen. Correlate for history of trauma, and recommend consultation with the neurovascular Center given the patient's history of aneurysm.      Workstation performed: [id number]         CTA stroke alert (head/neck)   Final Result by Guido Turner MD (09/03 2042)      Negative CTA head and neck for large vessel occlusion, dissection, or high-grade stenosis.      0.3 cm aneurysm in left ICA supraclinoid segment projecting inferomedially 0.2 cm aneurysm in right ICA supraclinoid segment projecting inferomedially. Recommend consultation with the Neurovascular Center, a division of [facility]    at [phone].      Partially imaged mildly enhancing left vestibular schwannoma.      Additional chronic/incidental findings as detailed above.               Findings were directly discussed with Jose Maria Burciaga at approximately 8:33 p.m. on 9/3/2024.      Workstation performed: [id number]         CT stroke alert brain   Final Result by Guido Turner MD (09/03 2033)      No acute intracranial abnormality.      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      Known left vestibular schwannoma is better evaluated on MRI brain with and without contrast 4/2/2023.      Findings were directly discussed with Jose Maria Burciaga at approximately 8:32 p.m. on 9/3/2024.      Workstation  performed: [id number]             Other Diagnostic Testing: I have personally reviewed pertinent reports.      ACTIVE MEDICATIONS     Current Facility-Administered Medications   Medication Dose Route Frequency    acetaminophen (TYLENOL) tablet 650 mg  650 mg Oral Q6H PRN    aspirin chewable tablet 81 mg  81 mg Oral Daily    atorvastatin (LIPITOR) tablet 40 mg  40 mg Oral Daily    clopidogrel (PLAVIX) tablet 75 mg  75 mg Oral Daily    heparin (porcine) subcutaneous injection 5,000 Units  5,000 Units Subcutaneous Q8H [facility]    levothyroxine tablet 88 mcg  88 mcg Oral Early Morning    melatonin tablet 3 mg  3 mg Oral HS PRN    multivitamin-minerals (CENTRUM) tablet 1 tablet  1 tablet Oral Daily    OXcarbazepine (TRILEPTAL) tablet 150 mg  150 mg Oral BID    senna-docusate sodium (SENOKOT S) 8.6-50 mg per tablet 2 tablet  2 tablet Oral HS PRN       Prior to Admission medications    Medication Sig Start Date End Date Taking? Authorizing Provider   acetaminophen (TYLENOL) 500 mg tablet Take 1,000 mg by mouth every 6 (six) hours as needed for mild pain   Yes Historical Provider, MD   atorvastatin (LIPITOR) 20 mg tablet TAKE 1 TABLET BY MOUTH ONCE  DAILY 8/24/24  Yes ELAYNE Dominguez   ibuprofen (MOTRIN) 200 mg tablet Take 200 mg by mouth every 6 (six) hours as needed for mild pain   Yes Historical Provider, MD   levothyroxine 88 mcg tablet TAKE 1 TABLET BY MOUTH DAILY 8/24/24  Yes ELAYNE Dominguez   Multiple Vitamin (MULTIVITAMIN) tablet Take 1 tablet by mouth daily   Yes Historical Provider, MD   OXcarbazepine (TRILEPTAL) 150 mg tablet TAKE 1 TABLET BY MOUTH TWICE A DAY 8/6/24  Yes ELAYNE Dominguez         VTE Pharmacologic Prophylaxis: VTE covered by:  heparin (porcine), Subcutaneous, 5,000 Units at 09/04/24 2105      VTE Mechanical Prophylaxis: sequential compression device and foot pump applied    ======    I have discussed the patient's history, physical exam findings, assessment, and plan in detail  with attending, Dr. Benjamin.    Thank you for allowing me to participate in the care of your patient, Krupa Baxter.    Denise Ray DO  [facility]'s Neurology Residency, PGY-3

## 2024-09-05 NOTE — TELEPHONE ENCOUNTER
Patient did not have the EEG done . Was told by central scheduling she could  not have Ambulatory  EEG 24hr before having a routine EEG. Patient is needs clarification on what to do next. Please advise Also having problems with cell phone please call home number

## 2024-09-05 NOTE — TELEPHONE ENCOUNTER
Patient called asking if the office can give her a call regarding an EEG she had done    Please advise

## 2024-09-05 NOTE — TELEPHONE ENCOUNTER
HFU/SLAN/ Stroke-like symptoms      DC-Home- 9/5/24    Notes:  Krupa Baxter will need follow-up in  6-8 weeks  with neurovascular team for TIA in 60 minute appointment. They will require a routine EEG when able before appointment.     Scheduled HFU:    Nallely Garcia 10/17/24 9:30 [facility]  Provided pt Central Scheduling no. To schedule EEG

## 2024-09-06 ENCOUNTER — TELEPHONE (OUTPATIENT)
Age: 74
End: 2024-09-06

## 2024-09-06 ENCOUNTER — TRANSITIONAL CARE MANAGEMENT (OUTPATIENT)
Dept: INTERNAL MEDICINE CLINIC | Facility: CLINIC | Age: 74
End: 2024-09-06

## 2024-09-06 NOTE — TELEPHONE ENCOUNTER
Patient has an appt on 09/18/24 with Dr. Wright. She wanted to see if he had anything sooner because the pain is causing her blood pressure to go up.  She is on the wait list. If anything does open up with Dr. Wright, please call her.

## 2024-09-06 NOTE — TELEPHONE ENCOUNTER
Patient called and stated she tried to schedule ambulatory 24 hour EEG and was told her insurance will not allow; needs new script for a routine EEG.  Advised someone will call her back.    Please assist, thank you

## 2024-09-09 ENCOUNTER — TELEPHONE (OUTPATIENT)
Dept: INTERNAL MEDICINE CLINIC | Facility: CLINIC | Age: 74
End: 2024-09-09

## 2024-09-09 NOTE — TELEPHONE ENCOUNTER
Blood pressure readings are slightly high, I can start her on medication, or continue to monitor and bring log to her appointment next week.

## 2024-09-09 NOTE — TELEPHONE ENCOUNTER
I spoke w/ pt. Re: TCM appt. Pt. Originally had f/u appt. Sched'd for 9/16-will now be TCM appt. Pt. States that her bp's with her home bp cuff have been running high. Some readings are: 140/88,124/70,152/78.131/73,136/84.    I asked pt. if she wanted to move her TCM appt. Sooner due to her bp concerns and she states she doesn't think so. Her admission was for stroke like symptoms.

## 2024-09-11 ENCOUNTER — TELEPHONE (OUTPATIENT)
Age: 74
End: 2024-09-11

## 2024-09-11 NOTE — TELEPHONE ENCOUNTER
Patient called in stating she had a bruise still from where she had gotten Heparin shot. Patient asked if that was normal and if she can still take her aspirin. Patient stated she wanted to just stop by today to see if someone could take a look.    Please advise, thank you

## 2024-09-11 NOTE — TELEPHONE ENCOUNTER
Yes that is normal. She can apply warm compresses 20 minutes 3 times a day. She can still take her aspirin. The area should not be getting bigger or more painful.

## 2024-09-11 NOTE — TELEPHONE ENCOUNTER
Called patient- made her aware that we received the pictures via [employer]. Sent to Deya for further assessment. Notified patient that if the area starts hardening, getting bigger, and painful to let us know.

## 2024-09-12 ENCOUNTER — HOSPITAL ENCOUNTER (OUTPATIENT)
Dept: NEUROLOGY | Facility: CLINIC | Age: 74
End: 2024-09-12
Payer: MEDICARE

## 2024-09-12 DIAGNOSIS — R29.90 STROKE-LIKE SYMPTOMS: ICD-10-CM

## 2024-09-12 PROCEDURE — 95816 EEG AWAKE AND DROWSY: CPT | Performed by: PSYCHIATRY & NEUROLOGY

## 2024-09-12 PROCEDURE — 95816 EEG AWAKE AND DROWSY: CPT

## 2024-09-13 ENCOUNTER — OFFICE VISIT (OUTPATIENT)
Dept: INTERNAL MEDICINE CLINIC | Facility: CLINIC | Age: 74
End: 2024-09-13
Payer: MEDICARE

## 2024-09-13 ENCOUNTER — APPOINTMENT (OUTPATIENT)
Dept: LAB | Facility: CLINIC | Age: 74
End: 2024-09-13
Payer: MEDICARE

## 2024-09-13 VITALS
HEART RATE: 98 BPM | OXYGEN SATURATION: 96 % | WEIGHT: 147.2 LBS | HEIGHT: 63 IN | DIASTOLIC BLOOD PRESSURE: 84 MMHG | TEMPERATURE: 97 F | BODY MASS INDEX: 26.08 KG/M2 | SYSTOLIC BLOOD PRESSURE: 138 MMHG

## 2024-09-13 DIAGNOSIS — I10 ESSENTIAL HYPERTENSION: ICD-10-CM

## 2024-09-13 DIAGNOSIS — R73.03 PREDIABETES: ICD-10-CM

## 2024-09-13 DIAGNOSIS — E78.00 HYPERCHOLESTEROLEMIA: ICD-10-CM

## 2024-09-13 DIAGNOSIS — E03.9 HYPOTHYROIDISM, UNSPECIFIED TYPE: ICD-10-CM

## 2024-09-13 DIAGNOSIS — T14.8XXA SUBCUTANEOUS HEMATOMA: Primary | ICD-10-CM

## 2024-09-13 DIAGNOSIS — R29.90 STROKE-LIKE SYMPTOMS: ICD-10-CM

## 2024-09-13 LAB
BASOPHILS # BLD AUTO: 0.08 THOUSANDS/ΜL (ref 0–0.1)
BASOPHILS NFR BLD AUTO: 1 % (ref 0–1)
EOSINOPHIL # BLD AUTO: 0.07 THOUSAND/ΜL (ref 0–0.61)
EOSINOPHIL NFR BLD AUTO: 1 % (ref 0–6)
ERYTHROCYTE [DISTWIDTH] IN BLOOD BY AUTOMATED COUNT: 13.8 % (ref 11.6–15.1)
HCT VFR BLD AUTO: 40.9 % (ref 34.8–46.1)
HGB BLD-MCNC: 13.2 G/DL (ref 11.5–15.4)
IMM GRANULOCYTES # BLD AUTO: 0.02 THOUSAND/UL (ref 0–0.2)
IMM GRANULOCYTES NFR BLD AUTO: 0 % (ref 0–2)
LYMPHOCYTES # BLD AUTO: 3.06 THOUSANDS/ΜL (ref 0.6–4.47)
LYMPHOCYTES NFR BLD AUTO: 35 % (ref 14–44)
MCH RBC QN AUTO: 31.6 PG (ref 26.8–34.3)
MCHC RBC AUTO-ENTMCNC: 32.3 G/DL (ref 31.4–37.4)
MCV RBC AUTO: 98 FL (ref 82–98)
MONOCYTES # BLD AUTO: 0.88 THOUSAND/ΜL (ref 0.17–1.22)
MONOCYTES NFR BLD AUTO: 10 % (ref 4–12)
NEUTROPHILS # BLD AUTO: 4.57 THOUSANDS/ΜL (ref 1.85–7.62)
NEUTS SEG NFR BLD AUTO: 53 % (ref 43–75)
NRBC BLD AUTO-RTO: 0 /100 WBCS
PLATELET # BLD AUTO: 280 THOUSANDS/UL (ref 149–390)
PMV BLD AUTO: 10.5 FL (ref 8.9–12.7)
RBC # BLD AUTO: 4.18 MILLION/UL (ref 3.81–5.12)
WBC # BLD AUTO: 8.68 THOUSAND/UL (ref 4.31–10.16)

## 2024-09-13 PROCEDURE — 99214 OFFICE O/P EST MOD 30 MIN: CPT | Performed by: NURSE PRACTITIONER

## 2024-09-13 PROCEDURE — 85025 COMPLETE CBC W/AUTO DIFF WBC: CPT

## 2024-09-13 PROCEDURE — 36415 COLL VENOUS BLD VENIPUNCTURE: CPT

## 2024-09-13 PROCEDURE — G2211 COMPLEX E/M VISIT ADD ON: HCPCS | Performed by: NURSE PRACTITIONER

## 2024-09-13 NOTE — PROGRESS NOTES
"Ambulatory Visit  Name: Krupa Baxter      : 1950      MRN: [id number]  Encounter Provider: ELAYNE Dominguez  Encounter Date: 2024   Encounter department: [facility] INTERNAL MEDICINE    Assessment & Plan  Subcutaneous hematoma  Provided reassurance, area is healing and will likely resolve on its own. Will check CBC today.   Orders:    CBC and differential; Future    Essential hypertension  Monitor BP over the weekend. Bring in BP log and machine on Monday at TCM appointment. Discussed starting medication if still elevated.        Stroke-like symptoms  On asa and statin.  Scheduled with neurology.           History of Present Illness     Krupa is here today with a hematoma on her left abdomen  Bruising from a heparin injection while she was in the hospital 2 weeks ago   It has been getting bigger, not painful or firm to touch.   She denies any abdominal pain.     Blood pressures at home running 140/80-90's           Review of Systems   Constitutional:  Negative for activity change, appetite change and fatigue.   Respiratory:  Negative for shortness of breath.    Cardiovascular:  Negative for chest pain.   Neurological:  Negative for dizziness, light-headedness and headaches.   Hematological:  Bruises/bleeds easily.           Objective     /84   Pulse 98   Temp (!) 97 °F (36.1 °C)   Ht 5' 2.5\" (1.588 m)   Wt 66.8 kg (147 lb 3.2 oz)   SpO2 96%   BMI 26.49 kg/m²     Physical Exam  Vitals reviewed.   Constitutional:       Appearance: Normal appearance.   HENT:      Head: Normocephalic and atraumatic.   Eyes:      Conjunctiva/sclera: Conjunctivae normal.   Cardiovascular:      Rate and Rhythm: Normal rate and regular rhythm.      Heart sounds: Normal heart sounds.   Pulmonary:      Effort: Pulmonary effort is normal.      Breath sounds: Normal breath sounds.   Abdominal:      General: Bowel sounds are normal.      Palpations: Abdomen is soft.      Tenderness: There is no " abdominal tenderness.       Neurological:      Mental Status: She is alert and oriented to person, place, and time.   Psychiatric:         Mood and Affect: Mood is anxious.         Behavior: Behavior normal.

## 2024-09-13 NOTE — ASSESSMENT & PLAN NOTE
Monitor BP over the weekend. Bring in BP log and machine on Monday at TCM appointment. Discussed starting medication if still elevated.

## 2024-09-16 ENCOUNTER — OFFICE VISIT (OUTPATIENT)
Dept: INTERNAL MEDICINE CLINIC | Facility: CLINIC | Age: 74
End: 2024-09-16
Payer: MEDICARE

## 2024-09-16 VITALS
SYSTOLIC BLOOD PRESSURE: 124 MMHG | TEMPERATURE: 97.5 F | BODY MASS INDEX: 25.34 KG/M2 | WEIGHT: 143 LBS | OXYGEN SATURATION: 98 % | HEIGHT: 63 IN | DIASTOLIC BLOOD PRESSURE: 84 MMHG | HEART RATE: 102 BPM

## 2024-09-16 DIAGNOSIS — E03.9 HYPOTHYROIDISM, UNSPECIFIED TYPE: ICD-10-CM

## 2024-09-16 DIAGNOSIS — D33.3 LEFT ACOUSTIC NEUROMA (HCC): ICD-10-CM

## 2024-09-16 DIAGNOSIS — R29.90 STROKE-LIKE SYMPTOMS: Primary | ICD-10-CM

## 2024-09-16 DIAGNOSIS — I10 ESSENTIAL HYPERTENSION: ICD-10-CM

## 2024-09-16 DIAGNOSIS — R73.03 PREDIABETES: ICD-10-CM

## 2024-09-16 DIAGNOSIS — K82.8 DYSFUNCTIONAL GALLBLADDER: ICD-10-CM

## 2024-09-16 DIAGNOSIS — E78.00 HYPERCHOLESTEROLEMIA: ICD-10-CM

## 2024-09-16 PROCEDURE — 99495 TRANSJ CARE MGMT MOD F2F 14D: CPT | Performed by: NURSE PRACTITIONER

## 2024-09-16 RX ORDER — AMLODIPINE BESYLATE 5 MG/1
5 TABLET ORAL DAILY
Qty: 30 TABLET | Refills: 0 | Status: SHIPPED | OUTPATIENT
Start: 2024-09-16

## 2024-09-16 NOTE — ASSESSMENT & PLAN NOTE
Start amlodipine 5 mg daily. Reviewed side effects of the medication. Continue home blood pressure monitoring and report back in 2 weeks.   Orders:    amLODIPine (NORVASC) 5 mg tablet; Take 1 tablet (5 mg total) by mouth daily

## 2024-09-16 NOTE — ASSESSMENT & PLAN NOTE
Check TSH. On levothyroxine.   Orders:    TSH, 3rd generation with Free T4 reflex; Future    TSH, 3rd generation with Free T4 reflex

## 2024-09-16 NOTE — PROGRESS NOTES
Transition of Care Visit  Name: Krupa Baxter      : 1950      MRN: [id number]  Encounter Provider: ELAYNE Dominguez  Encounter Date: 2024   Encounter department: [facility] INTERNAL MEDICINE    Assessment & Plan  Stroke-like symptoms  On asa and statin.        Essential hypertension  Start amlodipine 5 mg daily. Reviewed side effects of the medication. Continue home blood pressure monitoring and report back in 2 weeks.   Orders:    amLODIPine (NORVASC) 5 mg tablet; Take 1 tablet (5 mg total) by mouth daily    Dysfunctional gallbladder  Scheduled with surgery this week.        Hypothyroidism, unspecified type  Check TSH. On levothyroxine.   Orders:    TSH, 3rd generation with Free T4 reflex; Future    TSH, 3rd generation with Free T4 reflex    Left acoustic neuroma (HCC)  Stable.   On trileptal.        Prediabetes  A1C 6.1%.   Orders:    Hemoglobin A1C; Future    Comprehensive metabolic panel; Future    CBC and differential; Future    Comprehensive metabolic panel    CBC and differential    Hypercholesterolemia  Continue statin.             History of Present Illness     Transitional Care Management Review:   Krupa Baxter is a 74 y.o. female here for TCM follow up.     During the TCM phone call patient stated:  TCM Call       Date and time call was made  2024 11:41 AM    Hospital care reviewed  Records reviewed    Patient was hospitialized at  [facility]    Date of Admission  24    Date of discharge  24    Diagnosis  Stroke like symptoms    Disposition  Home    Were the patients medications reviewed and updated  No    Current Symptoms  Upper abdominal pain    Upper abdominal pain onset  Ongoing          TCM Call       Scheduled for follow up?  Yes    Did you obtain your prescribed medications  Yes    Do you need help managing your prescriptions or medications  No    Is transportation to your appointment needed  No    I have advised the patient to call PCP  "with any new or worsening symptoms  Brittney Jorge    Living Arrangements  Spouse or Significiant other    Are you recieving any outpatient services  No    Are you recieving home care services  No    Are you using any community resources  No    Current waiver services  No    Have you fallen in the last 12 months  No    Interperter language line needed  No    Comments  LM on VM          Krupa was hospitalized from 9/3-9/5 with stroke like symptoms.   CT negative for stroke.   Echo EF 60%  EEG negative  She was started on asa and her statin dose was increased.     She has been monitoring her blood pressure at home. 120-150/70-80's     She was seen in the office last week for a hematoma on her abdomen from a heparin injection. Area is improving. Bruising is less.       Review of Systems   Constitutional:  Negative for activity change and appetite change.   Respiratory:  Negative for cough and shortness of breath.    Cardiovascular:  Negative for chest pain, palpitations and leg swelling.   Gastrointestinal:  Negative for abdominal pain, blood in stool, constipation and diarrhea.   Genitourinary:  Negative for difficulty urinating.   Musculoskeletal:  Negative for arthralgias.   Neurological:  Negative for dizziness, light-headedness and headaches.   Hematological:  Bruises/bleeds easily.   Psychiatric/Behavioral:  Negative for sleep disturbance.      Objective     /84 (BP Location: Left arm, Patient Position: Sitting, Cuff Size: Standard)   Pulse 102   Temp 97.5 °F (36.4 °C)   Ht 5' 2.5\" (1.588 m)   Wt 64.9 kg (143 lb)   SpO2 98%   Breastfeeding No   BMI 25.74 kg/m²     Physical Exam  Vitals reviewed.   Constitutional:       Appearance: Normal appearance. She is well-developed.   HENT:      Head: Normocephalic and atraumatic.   Eyes:      Conjunctiva/sclera: Conjunctivae normal.   Neck:      Thyroid: No thyromegaly.   Cardiovascular:      Rate and Rhythm: Normal rate and regular rhythm.      Heart " sounds: Normal heart sounds.   Pulmonary:      Effort: Pulmonary effort is normal.      Breath sounds: Normal breath sounds.   Abdominal:      General: Bowel sounds are normal.      Palpations: Abdomen is soft.   Musculoskeletal:         General: Normal range of motion.      Right lower leg: No edema.      Left lower leg: No edema.   Skin:     General: Skin is warm and dry.   Neurological:      Mental Status: She is alert and oriented to person, place, and time.   Psychiatric:         Mood and Affect: Mood normal.         Behavior: Behavior normal.     Medications have been reviewed by provider in current encounter    Administrative Statements

## 2024-09-16 NOTE — ASSESSMENT & PLAN NOTE
A1C 6.1%.   Orders:    Hemoglobin A1C; Future    Comprehensive metabolic panel; Future    CBC and differential; Future    Comprehensive metabolic panel    CBC and differential

## 2024-09-18 ENCOUNTER — PREP FOR PROCEDURE (OUTPATIENT)
Dept: SURGERY | Facility: CLINIC | Age: 74
End: 2024-09-18

## 2024-09-18 ENCOUNTER — OFFICE VISIT (OUTPATIENT)
Dept: SURGERY | Facility: CLINIC | Age: 74
End: 2024-09-18
Payer: MEDICARE

## 2024-09-18 ENCOUNTER — TELEPHONE (OUTPATIENT)
Age: 74
End: 2024-09-18

## 2024-09-18 VITALS
SYSTOLIC BLOOD PRESSURE: 142 MMHG | HEART RATE: 85 BPM | OXYGEN SATURATION: 98 % | WEIGHT: 145 LBS | HEIGHT: 63 IN | BODY MASS INDEX: 25.69 KG/M2 | DIASTOLIC BLOOD PRESSURE: 84 MMHG

## 2024-09-18 DIAGNOSIS — K82.8 BILIARY DYSKINESIA: Primary | ICD-10-CM

## 2024-09-18 PROCEDURE — 99204 OFFICE O/P NEW MOD 45 MIN: CPT | Performed by: SURGERY

## 2024-09-18 NOTE — ASSESSMENT & PLAN NOTE
Lengthy discussion with Krupa regarding biliary dyskinesia and potential costochondritis.  Given low ejection fraction, I do recommend a laparoscopic cholecystectomy.  This may possibly be robotic assisted depending upon equipment availability.    Discussed risks and benefits including potential bile duct injury bowel injury, need for open surgery.  Explained that there is a chance that this may not resolve her symptoms.  I do think she has a component of musculoskeletal/costochondritis contributing towards her pain.    At this point she would like to proceed with cholecystectomy.

## 2024-09-18 NOTE — LETTER
2024     ELAYNE Dominguez  [address].  [address]  [facility] [address]    Patient: Krupa Baxter   YOB: 1950   Date of Visit: 2024       Dear Dr. Hodge:    Thank you for referring Krupa Baxter to me for evaluation. Below are my notes for this consultation.    If you have questions, please do not hesitate to call me. I look forward to following your patient along with you.         Sincerely,        Hernando Wright DO        CC: No Recipients    Hernando Wright DO  2024 12:31 PM  Sign when Signing Visit  Ambulatory Visit  Name: Krupa Baxter      : 1950      MRN: [id number]  Encounter Provider: Hernando Wright DO  Encounter Date: 2024   Encounter department: [facility]    Assessment & Plan  Biliary dyskinesia  Lengthy discussion with Krupa regarding biliary dyskinesia and potential costochondritis.  Given low ejection fraction, I do recommend a laparoscopic cholecystectomy.  This may possibly be robotic assisted depending upon equipment availability.    Discussed risks and benefits including potential bile duct injury bowel injury, need for open surgery.  Explained that there is a chance that this may not resolve her symptoms.  I do think she has a component of musculoskeletal/costochondritis contributing towards her pain.    At this point she would like to proceed with cholecystectomy.         History of Present Illness    Krupa Baxter is a 74 y.o. female who presents for gallbladder consult.  States she has had RUQ pain that radiates to her back for 2 months.   Ultrasound RUQ 8/15/2024  IMPRESSION:   1.  Left hepatic lobe and right renal simple appearing cysts.  2.  No calcified gallstones.     HIDA scan 2024IMPRESSION:   1. Normal hepatobiliary study.  2. Diminished contractile response of the gallbladder to cholecystokinin infusion. (22%) this may be associated with chronic gallbladder  "dysfunction in the appropriate setting      74-year-old female who states she is actually had right lateral rib pain for many years.  This will seem to radiate into her back and into the front.  Does seem quite positional.  Cannot really related to any particular foods.  Something she has been able to deal with until the last 2 to 3 months.  Dates she has had increased pain.  Relates an episode where she would eat tomato sandwiches with mayonnaise.  She has stopped this yet persists with pain.  She does not lay on her right side as this exacerbates the pain.  Denies nausea or bloating.  No gallstones were seen on above ultrasound.  HIDA scan did reveal low ejection fraction although did not really replicate her symptoms.  She has had other family members who have had the gallbladder removed for biliary dyskinesia.    She was recently in the hospital with \"strokelike symptoms\".  Suspect she had a TIA and has had no residual issues.  Workup in the hospitalization revealed no specific stroke.  Blood work and EKG and echocardiogram revealed no abnormalities.          History obtained from : patient  Review of Systems   Gastrointestinal:  Positive for abdominal pain.   All other systems reviewed and are negative.    Medical History Reviewed by provider this encounter:           Objective    /84   Pulse 85   Ht 5' 2.5\" (1.588 m)   Wt 65.8 kg (145 lb)   SpO2 98%   BMI 26.10 kg/m²     Physical Exam  Constitutional:       General: She is not in acute distress.     Comments: She tended to lean a little bit towards her right side to favor her right lateral ribs   HENT:      Mouth/Throat:      Mouth: Mucous membranes are moist.   Eyes:      Pupils: Pupils are equal, round, and reactive to light.   Cardiovascular:      Rate and Rhythm: Normal rate.   Pulmonary:      Effort: Pulmonary effort is normal.   Abdominal:      General: Abdomen is flat.      Palpations: Abdomen is soft. There is no mass.      Comments: Most of " her tenderness was at the right costovertebral junction below her scapula   Musculoskeletal:      Cervical back: Normal range of motion.   Neurological:      Mental Status: She is alert.

## 2024-09-18 NOTE — PROGRESS NOTES
Ambulatory Visit  Name: Krupa Baxter      : 1950      MRN: [id number]  Encounter Provider: Hernando Wright DO  Encounter Date: 2024   Encounter department: [facility]    Assessment & Plan  Biliary dyskinesia  Lengthy discussion with Krupa regarding biliary dyskinesia and potential costochondritis.  Given low ejection fraction, I do recommend a laparoscopic cholecystectomy.  This may possibly be robotic assisted depending upon equipment availability.    Discussed risks and benefits including potential bile duct injury bowel injury, need for open surgery.  Explained that there is a chance that this may not resolve her symptoms.  I do think she has a component of musculoskeletal/costochondritis contributing towards her pain.    At this point she would like to proceed with cholecystectomy.         History of Present Illness     Krupa Baxter is a 74 y.o. female who presents for gallbladder consult.  States she has had RUQ pain that radiates to her back for 2 months.   Ultrasound RUQ 8/15/2024  IMPRESSION:   1.  Left hepatic lobe and right renal simple appearing cysts.  2.  No calcified gallstones.     HIDA scan 2024IMPRESSION:   1. Normal hepatobiliary study.  2. Diminished contractile response of the gallbladder to cholecystokinin infusion. (22%) this may be associated with chronic gallbladder dysfunction in the appropriate setting      74-year-old female who states she is actually had right lateral rib pain for many years.  This will seem to radiate into her back and into the front.  Does seem quite positional.  Cannot really related to any particular foods.  Something she has been able to deal with until the last 2 to 3 months.  Dates she has had increased pain.  Relates an episode where she would eat tomato sandwiches with mayonnaise.  She has stopped this yet persists with pain.  She does not lay on her right side as this exacerbates the pain.  Denies  "nausea or bloating.  No gallstones were seen on above ultrasound.  HIDA scan did reveal low ejection fraction although did not really replicate her symptoms.  She has had other family members who have had the gallbladder removed for biliary dyskinesia.    She was recently in the hospital with \"strokelike symptoms\".  Suspect she had a TIA and has had no residual issues.  Workup in the hospitalization revealed no specific stroke.  Blood work and EKG and echocardiogram revealed no abnormalities.          History obtained from : patient  Review of Systems   Gastrointestinal:  Positive for abdominal pain.   All other systems reviewed and are negative.    Medical History Reviewed by provider this encounter:           Objective     /84   Pulse 85   Ht 5' 2.5\" (1.588 m)   Wt 65.8 kg (145 lb)   SpO2 98%   BMI 26.10 kg/m²     Physical Exam  Constitutional:       General: She is not in acute distress.     Comments: She tended to lean a little bit towards her right side to favor her right lateral ribs   HENT:      Mouth/Throat:      Mouth: Mucous membranes are moist.   Eyes:      Pupils: Pupils are equal, round, and reactive to light.   Cardiovascular:      Rate and Rhythm: Normal rate.   Pulmonary:      Effort: Pulmonary effort is normal.   Abdominal:      General: Abdomen is flat.      Palpations: Abdomen is soft. There is no mass.      Comments: Most of her tenderness was at the right costovertebral junction below her scapula   Musculoskeletal:      Cervical back: Normal range of motion.   Neurological:      Mental Status: She is alert.         "

## 2024-09-18 NOTE — H&P (VIEW-ONLY)
Ambulatory Visit  Name: Krupa Baxter      : 1950      MRN: [id number]  Encounter Provider: Hernando Wright DO  Encounter Date: 2024   Encounter department: [facility]    Assessment & Plan  Biliary dyskinesia  Lengthy discussion with Krupa regarding biliary dyskinesia and potential costochondritis.  Given low ejection fraction, I do recommend a laparoscopic cholecystectomy.  This may possibly be robotic assisted depending upon equipment availability.    Discussed risks and benefits including potential bile duct injury bowel injury, need for open surgery.  Explained that there is a chance that this may not resolve her symptoms.  I do think she has a component of musculoskeletal/costochondritis contributing towards her pain.    At this point she would like to proceed with cholecystectomy.         History of Present Illness     Krupa Baxter is a 74 y.o. female who presents for gallbladder consult.  States she has had RUQ pain that radiates to her back for 2 months.   Ultrasound RUQ 8/15/2024  IMPRESSION:   1.  Left hepatic lobe and right renal simple appearing cysts.  2.  No calcified gallstones.     HIDA scan 2024IMPRESSION:   1. Normal hepatobiliary study.  2. Diminished contractile response of the gallbladder to cholecystokinin infusion. (22%) this may be associated with chronic gallbladder dysfunction in the appropriate setting      74-year-old female who states she is actually had right lateral rib pain for many years.  This will seem to radiate into her back and into the front.  Does seem quite positional.  Cannot really related to any particular foods.  Something she has been able to deal with until the last 2 to 3 months.  Dates she has had increased pain.  Relates an episode where she would eat tomato sandwiches with mayonnaise.  She has stopped this yet persists with pain.  She does not lay on her right side as this exacerbates the pain.  Denies  "nausea or bloating.  No gallstones were seen on above ultrasound.  HIDA scan did reveal low ejection fraction although did not really replicate her symptoms.  She has had other family members who have had the gallbladder removed for biliary dyskinesia.    She was recently in the hospital with \"strokelike symptoms\".  Suspect she had a TIA and has had no residual issues.  Workup in the hospitalization revealed no specific stroke.  Blood work and EKG and echocardiogram revealed no abnormalities.          History obtained from : patient  Review of Systems   Gastrointestinal:  Positive for abdominal pain.   All other systems reviewed and are negative.    Medical History Reviewed by provider this encounter:           Objective     /84   Pulse 85   Ht 5' 2.5\" (1.588 m)   Wt 65.8 kg (145 lb)   SpO2 98%   BMI 26.10 kg/m²     Physical Exam  Constitutional:       General: She is not in acute distress.     Comments: She tended to lean a little bit towards her right side to favor her right lateral ribs   HENT:      Mouth/Throat:      Mouth: Mucous membranes are moist.   Eyes:      Pupils: Pupils are equal, round, and reactive to light.   Cardiovascular:      Rate and Rhythm: Normal rate.   Pulmonary:      Effort: Pulmonary effort is normal.   Abdominal:      General: Abdomen is flat.      Palpations: Abdomen is soft. There is no mass.      Comments: Most of her tenderness was at the right costovertebral junction below her scapula   Musculoskeletal:      Cervical back: Normal range of motion.   Neurological:      Mental Status: She is alert.         "

## 2024-09-18 NOTE — TELEPHONE ENCOUNTER
The patient would like a call from Deya Hodge. We ask the patient the reason for the call back and she responds that she would like to speak with her about Gallbladder surgery.     Please advice

## 2024-09-19 ENCOUNTER — ANESTHESIA EVENT (OUTPATIENT)
Dept: PERIOP | Facility: AMBULARY SURGERY CENTER | Age: 74
End: 2024-09-19
Payer: MEDICARE

## 2024-09-19 NOTE — TELEPHONE ENCOUNTER
Spoke with patient and she is agreeable to the surgery.  She will follow up after surgery if the pain continues.

## 2024-09-19 NOTE — TELEPHONE ENCOUNTER
Yes I would recommend getting the surgery since gallbladder is not functioning properly.   But I do agree it may not help with the pain that occurs in your back- if it doesn't help, we can try physical therapy or chiropractor for your back.

## 2024-09-19 NOTE — TELEPHONE ENCOUNTER
I spoke with the patient and a date is set for next Thursday for her gall bladder surgery.  The surgeon feels that the pain she is having is musculoskeletal rather that gall pain, but since the gall bladder is only working at 22% he will still continue with the surgery.  Patient wants your opinion on this as well.  She wants to know what the next step would be if she were to go ahead with the surgery and the pain is actually musculoskeletal.  She also asked if it's better for her to just go ahead with the surgery now rather than wait until she's older and it's even more difficult.

## 2024-09-19 NOTE — ED ATTENDING ATTESTATION
9/3/2024  IStanton MD, saw and evaluated the patient. I have discussed the patient with the resident/non-physician practitioner and agree with the resident's/non-physician practitioner's findings, Plan of Care, and MDM as documented in the resident's/non-physician practitioner's note, except where noted. All available labs and Radiology studies were reviewed.  I was present for key portions of any procedure(s) performed by the resident/non-physician practitioner and I was immediately available to provide assistance.       At this point I agree with the current assessment done in the Emergency Department.  I have conducted an independent evaluation of this patient a history and physical is as follows:    74-year-old female brought for evaluation of strokelike symptoms occurring this evening around 8 PM witnessed by .  Patient developed word finding difficulty, right-sided facial droop and had difficulty with gait.  Her symptoms improved significantly by the time of arrival here with residual facial droop.  Stroke alert initiated.    ED Course  ED Course as of 09/19/24 1716   Tue Sep 03, 2024   2108 CT shows:    No acute intracranial abnormality.     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     Known left vestibular schwannoma is better evaluated on MRI brain with and without contrast 4/2/2023.     2108 CTA shows:       Negative CTA head and neck for large vessel occlusion, dissection, or high-grade stenosis.     0.3 cm aneurysm in left ICA supraclinoid segment projecting inferomedially 0.2 cm aneurysm in right ICA supraclinoid segment projecting inferomedially. Recommend [facility] with [facility] of [facility]   at [phone].     Partially imaged mildly enhancing left vestibular schwannoma.       Admitted to medical service on stroke pathway.        Critical Care Time  CriticalCare Time    Date/Time: 9/3/2024 9:44 PM    Performed by:  Stanton Moss MD  Authorized by: Stanton Moss MD    Critical care provider statement:     Critical care time (minutes):  30    Critical care time was exclusive of:  Separately billable procedures and treating other patients and teaching time    Critical care was necessary to treat or prevent imminent or life-threatening deterioration of the following conditions:  CNS failure or compromise    Critical care was time spent personally by me on the following activities:  Development of treatment plan with patient or surrogate, discussions with consultants, evaluation of patient's response to treatment, examination of patient, ordering and performing treatments and interventions, ordering and review of laboratory studies, ordering and review of radiographic studies, re-evaluation of patient's condition, review of old charts and obtaining history from patient or surrogate

## 2024-09-24 RX ORDER — TROLAMINE SALICYLATE 10 G/100G
CREAM TOPICAL AS NEEDED
COMMUNITY

## 2024-09-24 NOTE — PRE-PROCEDURE INSTRUCTIONS
Pre-Surgery Instructions:   Medication Instructions    acetaminophen (TYLENOL) 500 mg tablet Take day of surgery.    amLODIPine (NORVASC) 5 mg tablet Take day of surgery.    aspirin 81 mg chewable tablet Instructions provided by MDInstructed by Dr Wright to hold ld 9/18    atorvastatin (LIPITOR) 40 mg tablet Take day of surgery.    levothyroxine 88 mcg tablet Take day of surgery.    OXcarbazepine (TRILEPTAL) 150 mg tablet Take day of surgery.    trolamine salicylate (ASPERCREME) 10 % cream Uses PRN- DO NOT take day of surgery    Medication instructions for day surgery reviewed. Please use only a sip of water to take your instructed medications. Avoid all over the counter vitamins, supplements and NSAIDS for one week prior to surgery per anesthesia guidelines. Tylenol is ok to take as needed.     You will receive a call one business day prior to surgery with an arrival time and hospital directions. If your surgery is scheduled on a Monday, the hospital will be calling you on the Friday prior to your surgery. If you have not heard from anyone by 8pm, please call the hospital supervisor through the hospital  at [phone]. ([place] [phone] or [place] [phone]).    Do not eat or drink anything after midnight the night before your surgery, including candy, mints, lifesavers, or chewing gum. Do not drink alcohol 24hrs before your surgery. Try not to smoke at least 24hrs before your surgery.       Follow the pre surgery showering instructions as listed in the “My Surgical Experience Booklet” or otherwise provided by your surgeon's office. Do not use a blade to shave the surgical area 1 week before surgery. It is okay to use a clean electric clippers up to 24 hours before surgery. Do not apply any lotions, creams, including makeup, cologne, deodorant, or perfumes after showering on the day of your surgery. Do not use dry shampoo, hair spray, hair gel, or any type of hair products.     No contact lenses,  eye make-up, or artificial eyelashes. Remove nail polish, including gel polish, and any artificial, gel, or acrylic nails if possible. Remove all jewelry including rings and body piercing jewelry.     Wear causal clothing that is easy to take on and off. Consider your type of surgery.    Keep any valuables, jewelry, piercings at home. Please bring any specially ordered equipment (sling, braces) if indicated.    Arrange for a responsible person to drive you to and from the hospital on the day of your surgery. Please confirm the visitor policy for the day of your procedure when you receive your phone call with an arrival time.     Call the surgeon's office with any new illnesses, exposures, or additional questions prior to surgery.    Please reference your “My Surgical Experience Booklet” for additional information to prepare for your upcoming surgery.

## 2024-09-26 ENCOUNTER — ANESTHESIA (OUTPATIENT)
Dept: PERIOP | Facility: AMBULARY SURGERY CENTER | Age: 74
End: 2024-09-26
Payer: MEDICARE

## 2024-09-26 ENCOUNTER — HOSPITAL ENCOUNTER (OUTPATIENT)
Facility: AMBULARY SURGERY CENTER | Age: 74
Setting detail: OUTPATIENT SURGERY
Discharge: HOME | End: 2024-09-26
Attending: SURGERY | Admitting: SURGERY
Payer: MEDICARE

## 2024-09-26 VITALS
SYSTOLIC BLOOD PRESSURE: 135 MMHG | DIASTOLIC BLOOD PRESSURE: 67 MMHG | WEIGHT: 145 LBS | RESPIRATION RATE: 16 BRPM | OXYGEN SATURATION: 92 % | HEIGHT: 63 IN | TEMPERATURE: 98.7 F | BODY MASS INDEX: 25.69 KG/M2 | HEART RATE: 64 BPM

## 2024-09-26 DIAGNOSIS — K82.8 BILIARY DYSKINESIA: Primary | ICD-10-CM

## 2024-09-26 PROCEDURE — 47562 LAPAROSCOPIC CHOLECYSTECTOMY: CPT | Performed by: SURGERY

## 2024-09-26 PROCEDURE — 88304 TISSUE EXAM BY PATHOLOGIST: CPT | Performed by: STUDENT IN AN ORGANIZED HEALTH CARE EDUCATION/TRAINING PROGRAM

## 2024-09-26 RX ORDER — BUPIVACAINE HYDROCHLORIDE 2.5 MG/ML
INJECTION, SOLUTION EPIDURAL; INFILTRATION; INTRACAUDAL AS NEEDED
Status: DISCONTINUED | OUTPATIENT
Start: 2024-09-26 | End: 2024-09-26 | Stop reason: HOSPADM

## 2024-09-26 RX ORDER — LIDOCAINE HYDROCHLORIDE 20 MG/ML
INJECTION, SOLUTION EPIDURAL; INFILTRATION; INTRACAUDAL; PERINEURAL AS NEEDED
Status: DISCONTINUED | OUTPATIENT
Start: 2024-09-26 | End: 2024-09-26

## 2024-09-26 RX ORDER — SODIUM CHLORIDE, SODIUM LACTATE, POTASSIUM CHLORIDE, CALCIUM CHLORIDE 600; 310; 30; 20 MG/100ML; MG/100ML; MG/100ML; MG/100ML
125 INJECTION, SOLUTION INTRAVENOUS CONTINUOUS
Status: DISCONTINUED | OUTPATIENT
Start: 2024-09-26 | End: 2024-09-26 | Stop reason: HOSPADM

## 2024-09-26 RX ORDER — OXYCODONE HYDROCHLORIDE 5 MG/1
5 TABLET ORAL EVERY 4 HOURS PRN
Status: DISCONTINUED | OUTPATIENT
Start: 2024-09-26 | End: 2024-09-26 | Stop reason: HOSPADM

## 2024-09-26 RX ORDER — PROPOFOL 10 MG/ML
INJECTION, EMULSION INTRAVENOUS AS NEEDED
Status: DISCONTINUED | OUTPATIENT
Start: 2024-09-26 | End: 2024-09-26

## 2024-09-26 RX ORDER — HYDROMORPHONE HCL/PF 1 MG/ML
0.5 SYRINGE (ML) INJECTION
Status: DISCONTINUED | OUTPATIENT
Start: 2024-09-26 | End: 2024-09-26 | Stop reason: HOSPADM

## 2024-09-26 RX ORDER — ROCURONIUM BROMIDE 10 MG/ML
INJECTION, SOLUTION INTRAVENOUS AS NEEDED
Status: DISCONTINUED | OUTPATIENT
Start: 2024-09-26 | End: 2024-09-26

## 2024-09-26 RX ORDER — FENTANYL CITRATE 50 UG/ML
INJECTION, SOLUTION INTRAMUSCULAR; INTRAVENOUS AS NEEDED
Status: DISCONTINUED | OUTPATIENT
Start: 2024-09-26 | End: 2024-09-26

## 2024-09-26 RX ORDER — DEXAMETHASONE SODIUM PHOSPHATE 10 MG/ML
INJECTION, SOLUTION INTRAMUSCULAR; INTRAVENOUS AS NEEDED
Status: DISCONTINUED | OUTPATIENT
Start: 2024-09-26 | End: 2024-09-26

## 2024-09-26 RX ORDER — PHENYLEPHRINE HCL IN 0.9% NACL 1 MG/10 ML
SYRINGE (ML) INTRAVENOUS AS NEEDED
Status: DISCONTINUED | OUTPATIENT
Start: 2024-09-26 | End: 2024-09-26

## 2024-09-26 RX ORDER — KETOROLAC TROMETHAMINE 30 MG/ML
INJECTION, SOLUTION INTRAMUSCULAR; INTRAVENOUS AS NEEDED
Status: DISCONTINUED | OUTPATIENT
Start: 2024-09-26 | End: 2024-09-26

## 2024-09-26 RX ORDER — ONDANSETRON 2 MG/ML
4 INJECTION INTRAMUSCULAR; INTRAVENOUS EVERY 4 HOURS PRN
Status: DISCONTINUED | OUTPATIENT
Start: 2024-09-26 | End: 2024-09-26 | Stop reason: HOSPADM

## 2024-09-26 RX ORDER — FENTANYL CITRATE/PF 50 MCG/ML
25 SYRINGE (ML) INJECTION
Status: DISCONTINUED | OUTPATIENT
Start: 2024-09-26 | End: 2024-09-26 | Stop reason: HOSPADM

## 2024-09-26 RX ORDER — HYDROMORPHONE HCL/PF 1 MG/ML
0.2 SYRINGE (ML) INJECTION
Status: DISCONTINUED | OUTPATIENT
Start: 2024-09-26 | End: 2024-09-26 | Stop reason: HOSPADM

## 2024-09-26 RX ORDER — HYDROMORPHONE HCL/PF 1 MG/ML
SYRINGE (ML) INJECTION AS NEEDED
Status: DISCONTINUED | OUTPATIENT
Start: 2024-09-26 | End: 2024-09-26

## 2024-09-26 RX ORDER — ONDANSETRON 2 MG/ML
4 INJECTION INTRAMUSCULAR; INTRAVENOUS ONCE AS NEEDED
Status: DISCONTINUED | OUTPATIENT
Start: 2024-09-26 | End: 2024-09-26 | Stop reason: HOSPADM

## 2024-09-26 RX ORDER — OXYCODONE HYDROCHLORIDE 5 MG/1
5 TABLET ORAL EVERY 4 HOURS PRN
Qty: 10 TABLET | Refills: 0 | Status: SHIPPED | OUTPATIENT
Start: 2024-09-26 | End: 2024-10-06

## 2024-09-26 RX ORDER — ONDANSETRON 2 MG/ML
INJECTION INTRAMUSCULAR; INTRAVENOUS AS NEEDED
Status: DISCONTINUED | OUTPATIENT
Start: 2024-09-26 | End: 2024-09-26

## 2024-09-26 RX ORDER — CEFAZOLIN SODIUM 1 G/50ML
1000 SOLUTION INTRAVENOUS ONCE
Status: COMPLETED | OUTPATIENT
Start: 2024-09-26 | End: 2024-09-26

## 2024-09-26 RX ADMIN — Medication 100 MCG: at 10:14

## 2024-09-26 RX ADMIN — ROCURONIUM BROMIDE 30 MG: 10 INJECTION INTRAVENOUS at 10:00

## 2024-09-26 RX ADMIN — PROPOFOL 120 MG: 10 INJECTION, EMULSION INTRAVENOUS at 09:59

## 2024-09-26 RX ADMIN — DEXAMETHASONE SODIUM PHOSPHATE 10 MG: 10 INJECTION, SOLUTION INTRAMUSCULAR; INTRAVENOUS at 10:00

## 2024-09-26 RX ADMIN — SUGAMMADEX 150 MG: 100 INJECTION, SOLUTION INTRAVENOUS at 10:46

## 2024-09-26 RX ADMIN — LIDOCAINE HYDROCHLORIDE 100 MG: 20 INJECTION, SOLUTION EPIDURAL; INFILTRATION; INTRACAUDAL at 09:59

## 2024-09-26 RX ADMIN — SODIUM CHLORIDE, SODIUM LACTATE, POTASSIUM CHLORIDE, AND CALCIUM CHLORIDE: .6; .31; .03; .02 INJECTION, SOLUTION INTRAVENOUS at 09:56

## 2024-09-26 RX ADMIN — KETOROLAC TROMETHAMINE 15 MG: 30 INJECTION, SOLUTION INTRAMUSCULAR; INTRAVENOUS at 10:32

## 2024-09-26 RX ADMIN — HYDROMORPHONE HYDROCHLORIDE 0.25 MG: 1 INJECTION, SOLUTION INTRAMUSCULAR; INTRAVENOUS; SUBCUTANEOUS at 10:27

## 2024-09-26 RX ADMIN — FENTANYL CITRATE 50 MCG: 50 INJECTION INTRAMUSCULAR; INTRAVENOUS at 09:59

## 2024-09-26 RX ADMIN — FENTANYL CITRATE 50 MCG: 50 INJECTION INTRAMUSCULAR; INTRAVENOUS at 10:18

## 2024-09-26 RX ADMIN — HYDROMORPHONE HYDROCHLORIDE 0.25 MG: 1 INJECTION, SOLUTION INTRAMUSCULAR; INTRAVENOUS; SUBCUTANEOUS at 10:48

## 2024-09-26 RX ADMIN — CEFAZOLIN SODIUM 2000 MG: 1 SOLUTION INTRAVENOUS at 10:03

## 2024-09-26 RX ADMIN — ONDANSETRON 4 MG: 2 INJECTION INTRAMUSCULAR; INTRAVENOUS at 10:32

## 2024-09-26 NOTE — ANESTHESIA POSTPROCEDURE EVALUATION
Post-Op Assessment Note    CV Status:  Stable  Pain Score: 0    Pain management: adequate       Mental Status:  Alert and awake   Hydration Status:  Euvolemic   PONV Controlled:  Controlled   Airway Patency:  Patent  Airway: intubated     Post Op Vitals Reviewed: Yes    No anethesia notable event occurred.    Staff: CRNA               BP   186/79   Temp 98.8   Pulse 100   Resp 16   SpO2 99

## 2024-09-26 NOTE — ANESTHESIA PREPROCEDURE EVALUATION
Procedure:  CHOLECYSTECTOMY LAPAROSCOPIC (Abdomen)    Relevant Problems   CARDIO   (+) Essential hypertension   (+) Hypercholesterolemia      ENDO   (+) Hypothyroidism      GI/HEPATIC   (+) Biliary dyskinesia      MUSCULOSKELETAL   (+) Biliary dyskinesia   (+) Primary osteoarthritis involving multiple joints      Digestive   (+) Dysfunctional gallbladder      Nervous and Auditory   (+) Left acoustic neuroma (HCC)      Ear/Nose/Throat   (+) Benign paroxysmal positional vertigo      Obstetrics/Gynecology   (+) Incomplete uterovaginal prolapse      Neurology/Sleep   (+) Forgetfulness   (+) Stroke-like symptoms   (+) Trigeminal neuralgia of left side of face      Urinary   (+) Other specified disorders of bladder      Surgery/Wound/Pain   (+) Chronic RUQ pain      Other   (+) Diarrhea   (+) Overweight (BMI 25.0-29.9)   (+) Prediabetes   (+) Vitamin D deficiency        Physical Exam    Airway    Mallampati score: II  TM Distance: <3 FB  Neck ROM: full     Dental   No notable dental hx     Cardiovascular  Cardiovascular exam normal    Pulmonary  Pulmonary exam normal     Other Findings  post-pubertal.      Anesthesia Plan  ASA Score- 2     Anesthesia Type- general with ASA Monitors.         Additional Monitors:     Airway Plan: ETT.           Plan Factors-Exercise tolerance (METS): >4 METS.    Chart reviewed. EKG reviewed.  Existing labs reviewed.     Patient is not a current smoker. Patient not instructed to abstain from smoking on day of procedure. Patient did not smoke on day of surgery.            Induction- intravenous.    Postoperative Plan-         Informed Consent- Anesthetic plan and risks discussed with patient.  I personally reviewed this patient with the CRNA. Discussed and agreed on the Anesthesia Plan with the CRNA..

## 2024-09-26 NOTE — INTERVAL H&P NOTE
H&P reviewed. After examining the patient I find no changes in the patients condition since the H&P had been written.    Vitals:    09/26/24 0847   BP: 154/72   Pulse: 94   Resp: 16   Temp: (!) 96.7 °F (35.9 °C)   SpO2: 96%

## 2024-09-26 NOTE — OP NOTE
OPERATIVE REPORT  PATIENT NAME: Krupa Baxter    :  1950  MRN: [id number]  Pt Location: [facility] OR ROOM 04    SURGERY DATE: 2024    Surgeons and Role:     * Hernando Wright DO - Primary     * Blade Farnces MD - Assisting    Preop Diagnosis:  Biliary dyskinesia [K82.8]    Post-Op Diagnosis Codes:     * Biliary dyskinesia [K82.8]    Procedure(s):  CHOLECYSTECTOMY LAPAROSCOPIC    Specimen(s):  ID Type Source Tests Collected by Time Destination   1 : CC family/referring MD Tissue Gallbladder TISSUE EXAM Hernando Wright DO 2024 1021        Estimated Blood Loss:   Minimal    Drains:  * No LDAs found *    Anesthesia Type:   General/local    Operative Indications:  Biliary dyskinesia [K82.8]      Operative Findings:  Somewhat distended gallbladder.  Height 62 inches weight 66 kg / 145 pounds.  BMI 26.  ASA 2.  Wound class II      Complications:   None    Procedure and Technique:  Patient was brought the operative suite and identified by visualization, conversation, by armband.  Sequential compression pumps were placed.  She was given Ancef perioperatively.  Once under anesthesia abdomen was prepped and draped in a sterile fashion.  Timeout was performed and is assured that the prep was dry.  Local was instilled at the infraumbilical fold.  Small vertical skin incision was made and the subcutaneous tissues were bluntly dissected with [place] clamps down to the fascia.  Fascia was lifted up and divided the midline.  Poke to the underlying peritoneum gaining access into the abdominal cavity.  11 mm trocar was placed under direct visualization.  CO2 was attached crating a pneumoperitoneum 3 other 5 mm bladeless trocars were then placed in the right upper quadrant.  Gallbladder is lifted cephalad.  It was mildly distended.  Stripped away the visceral peritoneum from the neck of the gallbladder.  This enabled us to identify cystic duct and cystic artery structures and visualize the critical view.   Both structures were divided to ligaclips.  Gallbladder is then taken off the liver using variable speed the harmonic scalpel.  There was excellent hemostasis.  Gallbladder was placed into an Endo Catch bag.  Irrigation was carried out.  No other pathology was noted.  Pneumoperitoneum was evacuated.  Gallbladder is brought to the umbilical port site.  Other trocars removed.  Fascia at umbilical port site was closed using 0 Vicryl in a figure-of-eight fashion.  More local was instilled.  4-0 Monocryl was used to close all skin incisions in a subcuticular fashion.  Wounds were washed and dried.  Sterile skin glue was applied.  She was awakened in the operating room and returned to the recovery area in stable condition having tolerated the procedure well.   I was present for the entire procedure.    Patient Disposition:  PACU              SIGNATURE: Hernando Wright DO  DATE: September 26, 2024  TIME: 10:37 AM

## 2024-09-30 DIAGNOSIS — M54.6 CHRONIC RIGHT-SIDED THORACIC BACK PAIN: Primary | ICD-10-CM

## 2024-09-30 DIAGNOSIS — G89.29 CHRONIC RIGHT-SIDED THORACIC BACK PAIN: Primary | ICD-10-CM

## 2024-09-30 PROCEDURE — 88304 TISSUE EXAM BY PATHOLOGIST: CPT | Performed by: STUDENT IN AN ORGANIZED HEALTH CARE EDUCATION/TRAINING PROGRAM

## 2024-10-10 ENCOUNTER — OFFICE VISIT (OUTPATIENT)
Dept: SURGERY | Facility: CLINIC | Age: 74
End: 2024-10-10

## 2024-10-10 DIAGNOSIS — K82.8 BILIARY DYSKINESIA: Primary | ICD-10-CM

## 2024-10-10 DIAGNOSIS — K80.20 CHOLELITHIASIS: ICD-10-CM

## 2024-10-10 PROCEDURE — 99024 POSTOP FOLLOW-UP VISIT: CPT | Performed by: SURGERY

## 2024-10-10 NOTE — LETTER
October 10, 2024     ELAYNE Dominguez  [address]  [address]  [facility] [address]    Patient: Krupa Baxter   YOB: 1950   Date of Visit: 10/10/2024       Dear Dr. Hodge:    Thank you for referring Krupa Baxter to me for evaluation. Below are my notes for this consultation.    If you have questions, please do not hesitate to call me. I look forward to following your patient along with you.         Sincerely,        Hernando Wright DO        CC: No Recipients    Hernando Wright DO  10/10/2024  9:46 AM  Sign when Signing Visit  Assessment/Plan:    Diagnoses and all orders for this visit:    Biliary dyskinesia    Cholelithiasis    Status post laparoscopic cholecystectomy for biliary dyskinesia.  A 1 mm stone was noted on pathology.  Most importantly she is feeling much better.  Most of her pain has resolved.  Still may have occasional stare or rib pain.  Consider costochondritis.  At this point recommended occasional NSAIDs and warm soaks.  May resume diet and activity as tolerated    Pathology: Reviewed with patient, all questions answered.       Postoperative restrictions reviewed. All questions answered.       ______________________________________________________  HPI: Patient presents post operatively.  Laparoscopic cholecystectomy 9/26/2024   Final Diagnosis  A. Gallbladder, cholecystectomy:   - Cholelithiasis.     - One benign lymph node.    Majority of her pain has subsided.  Point to the lower costovertebral junction for occasional pain but this has dramatically improved since her gallbladder has been removed                       ROS:  General ROS: negative for - chills, fatigue, fever or night sweats, weight loss  Respiratory ROS: no cough, shortness of breath, or wheezing  Cardiovascular ROS: no chest pain or dyspnea on exertion  Genito-Urinary ROS: no dysuria, trouble voiding, or hematuria  Musculoskeletal ROS: negative for - gait disturbance, joint pain or  muscle pain  Neurological ROS: no TIA or stroke symptoms  GI ROS: see HPI  Skin ROS: no new rashes or lesions   Lymphatic ROS: no new adenopathy noted by pt.   GYN ROS: see HPI, no new GYN history or bleeding noted  Psy ROS: no new mental or behavioral disturbances         Patient Active Problem List   Diagnosis   • Essential hypertension   • Hypercholesterolemia   • Hypothyroidism   • Incomplete uterovaginal prolapse   • Other specified disorders of bladder   • Primary osteoarthritis involving multiple joints   • Vitamin D deficiency   • Prediabetes   • Left acoustic neuroma (HCC)   • Overweight (BMI 25.0-29.9)   • Benign paroxysmal positional vertigo   • Trigeminal neuralgia of left side of face   • Diarrhea   • Chronic RUQ pain   • Forgetfulness   • Stroke-like symptoms   • Dysfunctional gallbladder   • Biliary dyskinesia       Allergies:  Erythromycin and Carbamazepine      Current Outpatient Medications:   •  acetaminophen (TYLENOL) 500 mg tablet, Take 1,000 mg by mouth every 6 (six) hours as needed for mild pain, Disp: , Rfl:   •  amLODIPine (NORVASC) 5 mg tablet, Take 1 tablet (5 mg total) by mouth daily, Disp: 30 tablet, Rfl: 0  •  aspirin 81 mg chewable tablet, Chew 1 tablet (81 mg total) daily, Disp: 30 tablet, Rfl: 0  •  atorvastatin (LIPITOR) 40 mg tablet, Take 1 tablet (40 mg total) by mouth daily, Disp: 30 tablet, Rfl: 0  •  levothyroxine 88 mcg tablet, TAKE 1 TABLET BY MOUTH DAILY, Disp: 90 tablet, Rfl: 1  •  OXcarbazepine (TRILEPTAL) 150 mg tablet, TAKE 1 TABLET BY MOUTH TWICE A DAY, Disp: 60 tablet, Rfl: 5  •  trolamine salicylate (ASPERCREME) 10 % cream, Apply topically as needed for muscle/joint pain, Disp: , Rfl:     Past Medical History:   Diagnosis Date   • Acoustic neuroma (HCC)     left ear, decreased hearing   • Arthritis     right knee   • Disease of thyroid gland    • History of radiation therapy 2007    Left acoustic neuroma   • Hyperlipidemia    • TIA (transient ischemic attack) 9/3/2024     Lasted about 2 hours   • Trigeminal neuralgia of left side of face    • Urethral hypermobility    • Uterine prolapse        Past Surgical History:   Procedure Laterality Date   • COLONOSCOPY     • RI CMBND ANTERPOST COLPORRAPHY W/CYSTO N/A 1/8/2019    Procedure: A&P COLPORRHAPHY;  Surgeon: Baron Coyle MD;  [facility];  Service: UroGynecology          • RI COLPOPEXY VAGINAL EXTRAPERITONEAL APPROACH N/A 1/8/2019    Procedure: ANTERIOR VE COLPOPEXY;  Surgeon: Baron Coyle MD;  [facility];  Service: UroGynecology          • RI CYSTOURETHROSCOPY N/A 1/8/2019    Procedure: CYSTOSCOPY;  Surgeon: Baron Coyle MD;  [facility];  Service: UroGynecology          • RI LAPAROSCOPY SURG CHOLECYSTECTOMY N/A 9/26/2024    Procedure: CHOLECYSTECTOMY LAPAROSCOPIC;  Surgeon: Hernando Wright DO;  [facility];  Service: General   • RI SLING OPERATION STRESS INCONTINENCE N/A 1/8/2019    Procedure: PUBOVAGINAL SLING;  Surgeon: Baron Coyle MD;  [facility];  Service: UroGynecology          • TUBAL LIGATION     • WISDOM TOOTH EXTRACTION         Family History   Problem Relation Age of Onset   • Arthritis Family    • Diabetes Family    • Hypertension Family    • Osteoporosis Family    • Kidney disease Family    • Thyroid disease Family    • Hypertension Mother    • Diabetes Father    • Asthma Father    • Lung disease Father    • Thyroid disease Sister    • Thyroid disease Daughter    • Mental illness Neg Hx    • Substance Abuse Neg Hx    • Alcohol abuse Neg Hx    • Depression Neg Hx         reports that she has never smoked. She has never used smokeless tobacco. She reports current alcohol use. She reports that she does not use drugs.    PHYSICAL EXAM    There were no vitals taken for this visit.    General: normal, cooperative, no distress  Abdominal: soft, nondistended, or nontender  Incision: clean, dry, and intact and healing well.  Trocar sites healing  inna Wright DO    Date: 10/10/2024 Time: 9:45 AM

## 2024-10-10 NOTE — PROGRESS NOTES
Assessment/Plan:    Diagnoses and all orders for this visit:    Biliary dyskinesia    Cholelithiasis    Status post laparoscopic cholecystectomy for biliary dyskinesia.  A 1 mm stone was noted on pathology.  Most importantly she is feeling much better.  Most of her pain has resolved.  Still may have occasional stare or rib pain.  Consider costochondritis.  At this point recommended occasional NSAIDs and warm soaks.  May resume diet and activity as tolerated    Pathology: Reviewed with patient, all questions answered.       Postoperative restrictions reviewed. All questions answered.       ______________________________________________________  HPI: Patient presents post operatively.  Laparoscopic cholecystectomy 9/26/2024   Final Diagnosis  A. Gallbladder, cholecystectomy:   - Cholelithiasis.     - One benign lymph node.    Majority of her pain has subsided.  Point to the lower costovertebral junction for occasional pain but this has dramatically improved since her gallbladder has been removed                       ROS:  General ROS: negative for - chills, fatigue, fever or night sweats, weight loss  Respiratory ROS: no cough, shortness of breath, or wheezing  Cardiovascular ROS: no chest pain or dyspnea on exertion  Genito-Urinary ROS: no dysuria, trouble voiding, or hematuria  Musculoskeletal ROS: negative for - gait disturbance, joint pain or muscle pain  Neurological ROS: no TIA or stroke symptoms  GI ROS: see HPI  Skin ROS: no new rashes or lesions   Lymphatic ROS: no new adenopathy noted by pt.   GYN ROS: see HPI, no new GYN history or bleeding noted  Psy ROS: no new mental or behavioral disturbances         Patient Active Problem List   Diagnosis    Essential hypertension    Hypercholesterolemia    Hypothyroidism    Incomplete uterovaginal prolapse    Other specified disorders of bladder    Primary osteoarthritis involving multiple joints    Vitamin D deficiency    Prediabetes    Left acoustic neuroma (HCC)     Overweight (BMI 25.0-29.9)    Benign paroxysmal positional vertigo    Trigeminal neuralgia of left side of face    Diarrhea    Chronic RUQ pain    Forgetfulness    Stroke-like symptoms    Dysfunctional gallbladder    Biliary dyskinesia       Allergies:  Erythromycin and Carbamazepine      Current Outpatient Medications:     acetaminophen (TYLENOL) 500 mg tablet, Take 1,000 mg by mouth every 6 (six) hours as needed for mild pain, Disp: , Rfl:     amLODIPine (NORVASC) 5 mg tablet, Take 1 tablet (5 mg total) by mouth daily, Disp: 30 tablet, Rfl: 0    aspirin 81 mg chewable tablet, Chew 1 tablet (81 mg total) daily, Disp: 30 tablet, Rfl: 0    atorvastatin (LIPITOR) 40 mg tablet, Take 1 tablet (40 mg total) by mouth daily, Disp: 30 tablet, Rfl: 0    levothyroxine 88 mcg tablet, TAKE 1 TABLET BY MOUTH DAILY, Disp: 90 tablet, Rfl: 1    OXcarbazepine (TRILEPTAL) 150 mg tablet, TAKE 1 TABLET BY MOUTH TWICE A DAY, Disp: 60 tablet, Rfl: 5    trolamine salicylate (ASPERCREME) 10 % cream, Apply topically as needed for muscle/joint pain, Disp: , Rfl:     Past Medical History:   Diagnosis Date    Acoustic neuroma (HCC)     left ear, decreased hearing    Arthritis     right knee    Disease of thyroid gland     History of radiation therapy 2007    Left acoustic neuroma    Hyperlipidemia     TIA (transient ischemic attack) 9/3/2024    Lasted about 2 hours    Trigeminal neuralgia of left side of face     Urethral hypermobility     Uterine prolapse        Past Surgical History:   Procedure Laterality Date    COLONOSCOPY      IL CMBND ANTERPOST COLPORRAPHY W/CYSTO N/A 1/8/2019    Procedure: A&P COLPORRHAPHY;  Surgeon: Baron Coyle MD;  [facility];  Service: UroGynecology           IL COLPOPEXY VAGINAL EXTRAPERITONEAL APPROACH N/A 1/8/2019    Procedure: ANTERIOR VE COLPOPEXY;  Surgeon: Baron Coyle MD;  [facility];  Service: UroGynecology           IL CYSTOURETHROSCOPY N/A 1/8/2019    Procedure: CYSTOSCOPY;   Surgeon: Baron Coyle MD;  [facility];  Service: UroGynecology           WI LAPAROSCOPY SURG CHOLECYSTECTOMY N/A 9/26/2024    Procedure: CHOLECYSTECTOMY LAPAROSCOPIC;  Surgeon: Hernando Wright DO;  [facility];  Service: General    WI SLING OPERATION STRESS INCONTINENCE N/A 1/8/2019    Procedure: PUBOVAGINAL SLING;  Surgeon: Baron Coyle MD;  [facility];  Service: UroGynecology           TUBAL LIGATION      WISDOM TOOTH EXTRACTION         Family History   Problem Relation Age of Onset    Arthritis Family     Diabetes Family     Hypertension Family     Osteoporosis Family     Kidney disease Family     Thyroid disease Family     Hypertension Mother     Diabetes Father     Asthma Father     Lung disease Father     Thyroid disease Sister     Thyroid disease Daughter     Mental illness Neg Hx     Substance Abuse Neg Hx     Alcohol abuse Neg Hx     Depression Neg Hx         reports that she has never smoked. She has never used smokeless tobacco. She reports current alcohol use. She reports that she does not use drugs.    PHYSICAL EXAM    There were no vitals taken for this visit.    General: normal, cooperative, no distress  Abdominal: soft, nondistended, or nontender  Incision: clean, dry, and intact and healing well.  Trocar sites healing well      Hernando Wright DO    Date: 10/10/2024 Time: 9:45 AM

## 2024-10-11 DIAGNOSIS — I10 ESSENTIAL HYPERTENSION: ICD-10-CM

## 2024-10-12 RX ORDER — AMLODIPINE BESYLATE 5 MG/1
5 TABLET ORAL DAILY
Qty: 30 TABLET | Refills: 0 | Status: SHIPPED | OUTPATIENT
Start: 2024-10-12

## 2024-10-17 ENCOUNTER — EVALUATION (OUTPATIENT)
Dept: PHYSICAL THERAPY | Age: 74
End: 2024-10-17
Payer: MEDICARE

## 2024-10-17 ENCOUNTER — OFFICE VISIT (OUTPATIENT)
Dept: NEUROLOGY | Facility: CLINIC | Age: 74
End: 2024-10-17
Payer: MEDICARE

## 2024-10-17 VITALS
WEIGHT: 145.7 LBS | HEART RATE: 103 BPM | SYSTOLIC BLOOD PRESSURE: 138 MMHG | HEIGHT: 63 IN | DIASTOLIC BLOOD PRESSURE: 74 MMHG | OXYGEN SATURATION: 99 % | TEMPERATURE: 97.4 F | BODY MASS INDEX: 25.82 KG/M2

## 2024-10-17 DIAGNOSIS — I67.1 INTERNAL CAROTID ANEURYSM: ICD-10-CM

## 2024-10-17 DIAGNOSIS — I68.0 CEREBRAL AMYLOID ANGIOPATHY  (HCC): Primary | ICD-10-CM

## 2024-10-17 DIAGNOSIS — G89.29 CHRONIC RIGHT-SIDED THORACIC BACK PAIN: Primary | ICD-10-CM

## 2024-10-17 DIAGNOSIS — E85.4 CEREBRAL AMYLOID ANGIOPATHY  (HCC): Primary | ICD-10-CM

## 2024-10-17 DIAGNOSIS — M54.6 CHRONIC RIGHT-SIDED THORACIC BACK PAIN: Primary | ICD-10-CM

## 2024-10-17 DIAGNOSIS — Z86.73 HISTORY OF TIA (TRANSIENT ISCHEMIC ATTACK): ICD-10-CM

## 2024-10-17 PROCEDURE — 97110 THERAPEUTIC EXERCISES: CPT

## 2024-10-17 PROCEDURE — 99215 OFFICE O/P EST HI 40 MIN: CPT

## 2024-10-17 PROCEDURE — 97161 PT EVAL LOW COMPLEX 20 MIN: CPT

## 2024-10-17 NOTE — PROGRESS NOTES
Ambulatory Visit  Name: Krupa Baxter      : 1950      MRN: [id number]  Encounter Provider: ELAYNE Lopez  Encounter Date: 10/17/2024   Encounter department: [facility]    Assessment & Plan  Cerebral amyloid angiopathy  (HCC)  Krupa Baxter is a 74 year old female who had an episode 9/3/24 of right facial weakness, RUE incoordination, RUE/RLE weakness, and expressive aphasia concerning for TIA. Her stroke work up was benign however her MRI did reveal hemosiderin staining along the sulci of the left superior frontal lobe and scattered areas of peripherally located remote blood products concerning for CAA, making possibility of amyloid spell as etiology of her episode unable to be ruled out. She denies any prior hx of uncontrolled hypertension or head trauma. Given possibility of TIA, she was advised to start Aspirin 81 mg daily. Today we discussed the importance of tight BP control going forward due to possibility of CAA, as well as discussed fall precautions to avoid head trauma. Given she has just started Aspirin, we will plan to repeat MRI Brain in 6 months (3/2025) to assess for stability of microhemorrhages while on Aspirin therapy. She will follow up in 5 months; sooner if needed.    Orders:    MRI brain without contrast; Future    Internal carotid aneurysm  During stroke work up CTA H/N revealed  0.3 cm aneurysm in left ICA supraclinoid segment projecting inferomedially and 0.2 cm aneurysm in right ICA supraclinoid segment projecting inferomedially.   She was referred to Neurosurgery for further evaluation     Orders:    Ambulatory referral to Neurosurgery; Future    History of TIA (transient ischemic attack)  Krupa Baxter is a 74 year old female who had an episode 9/3/24 of right facial weakness, RUE incoordination, RUE/RLE weakness, and expressive aphasia concerning for TIA. Her stroke work up was benign however her MRI did reveal hemosiderin  staining along the sulci of the left superior frontal lobe and scattered areas of peripherally located remote blood products concerning for CAA, making possibility of amyloid spell as etiology of her episode unable to be ruled out. Given possibility of TIA, she was advised to start Aspirin 81 mg daily and her atorvastatin was increased from 20 mg to 40 mg. She denies any reoccurrence of episode or symptoms since discharge. Her neurologic exam is intact and non focal. She did complete a routine EEG outpatient which was without epileptiform discharges concerning for seizure. Today we discussed the importance of tight BP control going forward due to possibility of CAA, as well as discussed fall precautions to avoid head trauma. Given she has just started Aspirin, we will plan to repeat MRI Brain in 6 months (3/2025) to assess for stability of microhemorrhages while on Aspirin therapy. She was also referred to neurosurgery for further evaluation of 0.3 cm aneurysm in left ICA supraclinoid segment projecting inferomedially and 0.2 cm aneurysm in right ICA supraclinoid segment projecting inferomedially. We reviewed her stroke risk factors and management of the same. We also discussed possibility of JESSICA given her report of loud snoring and mouth breathing through the night. I recommended she consider a sleep study to rule out JESSICA and discussed risks of untreated JESSICA including CAD and CVA. She declines to pursue testing at this time despite education provided. She was provided stroke education and we reviewed stroke warning signs/symptoms and reasons to return by ambulance to the ER. She will follow up in 5 months; sooner if needed.      Orders:    Ambulatory referral to Neurology      Patient Instructions   - Continue with good blood pressure control; I would recommend monitoring at home at least 3 times per week; Goal of <130/80  - Continue with good cholesterol control; Goal LDL <70  - Continue with good blood sugar  control; Goal HgbA1c <7.0  - Will defer monitoring of cholesterol and blood sugar and management of hypertensive medications to the primary care provider  - Stay well hydrated by drinking enough water   - Complete repeat MRI Brain to evaluate for micro hemorrhages in March 2025  - Eat a healthy diet, high in lean meats fish, turkey, chicken. Low in fats, cholesterol, sugars and sodium. Avoid canned foods,  get lets of fresh/frozen vegetables/fruits.  - Get routine exercise/physical activity as much as able to tolerate  - Keep follow ups with your other health care providers  - Referral to Neurosurgery for evaluation of ICA aneurysms   - Continue Aspirin 81 mg daily and Lipitor 40 mg daily.  - Fall precautions    I will plan for her to return to the office in 5 months time (after MRI is complete) but would be happy to see her sooner if the need should arise.  If she has any symptoms concerning for TIA or stroke including sudden painless loss of vision or double vision, difficulty speaking or swallowing, vertigo/room spinning that does not quickly resolve, or weakness/numbness affecting 1 side of the face or body she should proceed by ambulance to the nearest emergency room immediately.     History of Present Illness     HPI As per patient, she was sitting and watching TV when her  noticed she stopped speaking and she appeared to have facial droop on the right side.  When she was getting to the car, she seemed to be confused and stumbling.     Initial NIHSS 1  Presenting deficits were: R facial droop, speech difficulties, confusion, gait imbalance  Interventions: Patient not a candidate. Symptoms resolved/clearly non disabling.      Workup:        Lab Results   Component Value Date     HGBA1C 6.1 (H) 09/03/2024     CHOLESTEROL 162 09/04/2024     LDLCALC 87 09/04/2024     TRIG 174 (H) 09/04/2024     INR 0.92 09/03/2024      CTH: No acute intracranial abnormality. 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 Known  left vestibular schwannoma is better evaluated on MRI brain with and without contrast 4/2/2023.  CTA: Negative CTA head and neck for large vessel occlusion, dissection, or high-grade stenosis. 0.3 cm aneurysm in left ICA supraclinoid segment projecting inferomedially 0.2 cm aneurysm in right ICA supraclinoid segment projecting inferomedially. Recommend consultation with the Neuro[facility], a division of [facility] at [phone]. Partially imaged mildly enhancing left vestibular schwannoma.   MRI: No acute infarct identified. Hemosiderin staining along the sulci of the left superior frontal lobe. There are also scattered areas of peripherally located remote blood products. These appear new/increased in comparison to prior exams, however technical differences limit the comparison. No acute blood products seen. Correlate for history of trauma, and recommend consultation with the neurovascular Center given the patient's history of aneurysm.  TTE/ANGIE: Left ventricular cavity size is normal. Wall thickness is normal. The left ventricular ejection fraction is 60%. Systolic function is normal. Wall motion is normal. Diastolic function is mildly abnormal, consistent with grade I (abnormal) relaxation.      Pertinent scores as of 09/05/24:  - NIHSS: 1     Impression: Patient is a 74 year old female who presented to the ED for stroke-like symptoms. Given the quick resolution of symptoms along with patient's risk factors, etiology most likely a high risk TIA given patient's ABCD2 score of 5 versus amyloid spells in the setting of probable CAA (age, clinical history, multiple hemorrhages with no explanation).  Patient should be on an AP and high-intensity statin for stroke prevention. Cannot rule out seizures so obtain routine EEG in the outpatient setting.     Plan:  Case discussed with Dr. Benjamin  Continue AP/AC: Loaded with  mg x1 and Plavix 300 mg x1, maintenance with ASA 81 mg daily  only given the possibility of CAA with the MRI findings.  Continue high-intensity statin: Lipitor 40 mg  Routine EEG outpatient to rule out seizures  Telemetry  Recommend stroke risk factor optimization   Healthy diet encouraged  PT/OT versus active exercise discussed  BP management and HTN med compliance discussed, continue normotension, goal SBP <140  Rest of care as per primary care team   No further inpatient recommendations at this time Please contact Neurology with any further questions or concerns   Patient will need outpatient Neurovascular follow-up in 6-8 weeks.     Attending Attestation    No complaints today. Doing well. Clinically nonfocal. MRI was personally reviewed, which reveals superficial siderosis overlying the L superior frontal area indicative of prior SAH as well as a few scattered microbleeds that are located peripherally and are bilateral.     Differential remains broad, including TIA and hypertensive encephalopathy, however given the MRI findings there is concern that she may have had a transient focal neurologic event (TFNE) related to underlying CAA, or potentially even focal seizure related to this prior area of SAH on the L, which would correspond with her reported deficits. No clear indication for AED and cannot entirely rule out TIA. As such, will reduce DAPT to single antiplatelet to reduce risk of hemorrhage though maintain some level of protection. BP must be managed intensively and discussed with patient/ at length with importance of BP management going forward. Pt should avoid AC in the future. Will plan for routine EEG as outpatient. Follow as outpatient otherwise; no further recs. Call with questions.    EEG awake or drowsy routine   This is a normal 26 minutes awake and drowsy EEG.  No epileptiform discharges are present; however, stage 2 sleep is not recorded.  If clinically indicated repeat EEG with sleep deprivation or prolonged study to capture sleep may increase  diagnostic yield.     ---------------------------------------------------------------------------------------------    She returns to the office today to review her hospitalization 9/3/24-9/5/24.  She reports during this event her right eye wouldn't open, she had right hand incoordination and weakness (unable to stand and walk), and expressive aphasia. Her  also reported facial droop, although she states she did not notice or feel this.    Secondary Stroke Prevention  She continues on ASA 81 mg (was not on aspirin prior to event)   Increased Atorvastatin from 20 mg to 40 mg in the hospital   Compliant with her medications, no issues affording or obtaining medications.   Denies excessive bruising or bleeding     Deficits  No reoccurrence of symptoms  Denies numbness, tingling, weakness, dizziness, headaches, vision changes or any new or worsening stroke like symptoms  Denies difficulty speaking, swallowing, walking     Monitoring  BP- she is not monitoring BP at home; denies prior uncontrolled hypertension in the past   Today BP is 138/74  HgbA1c 9/3/2024 6.1  LDL 9/4/24 87  CTA H/N 9/3/24: Negative CTA head and neck for large vessel occlusion, dissection, or high-grade stenosis.  0.3 cm aneurysm in left ICA supraclinoid segment projecting inferomedially 0.2 cm aneurysm in right ICA supraclinoid segment projecting inferomedially. Recommend consultation with the [facility], a division of [facility] at [phone]. Partially imaged mildly enhancing left vestibular schwannoma.     Safety  She lives with her spouse   Independent of all ADLs  No falls at home  She is managing her own medications  assistive devices- none  Driving- no difficulty  Memory- none    Substance use    Smoking- never  Etoh- none; very rare social use  Drugs- never  Caffeine - coffe/tea 1-2 cups daily     Sleep  She reports loud snoring  She has never had a sleep study  If she lays on her back she  breathes through her mouth during sleep  She declines testing at this time     Diet  She reports having a balanced healthy diet  She eats out once a week  She does enjoy candy      Exercise  She is physically active  Walking      PT/OT/ST  PT for back/flank pain currently      Mood  Denies anxiety or depression      Follow ups  Is s/p gallbladder removal 9/26/24    Review of Systems   Constitutional:  Negative for appetite change, fatigue and fever.   HENT: Negative.  Negative for hearing loss, tinnitus, trouble swallowing and voice change.    Eyes: Negative.  Negative for photophobia, pain and visual disturbance.   Respiratory: Negative.  Negative for shortness of breath.    Cardiovascular: Negative.  Negative for palpitations.   Gastrointestinal: Negative.  Negative for nausea and vomiting.   Endocrine: Negative.  Negative for cold intolerance.   Genitourinary: Negative.  Negative for dysuria, frequency and urgency.   Musculoskeletal:  Negative for back pain, gait problem, myalgias, neck pain and neck stiffness.   Skin: Negative.  Negative for rash.   Allergic/Immunologic: Negative.    Neurological: Negative.  Negative for dizziness, tremors, seizures, syncope, facial asymmetry, speech difficulty, weakness, light-headedness, numbness and headaches.   Hematological: Negative.  Does not bruise/bleed easily.   Psychiatric/Behavioral: Negative.  Negative for confusion, hallucinations and sleep disturbance.    All other systems reviewed and are negative.    I have personally reviewed the MA's review of systems and made changes as necessary.    Current Outpatient Medications on File Prior to Visit   Medication Sig Dispense Refill    amLODIPine (NORVASC) 5 mg tablet Take 1 tablet (5 mg total) by mouth daily 30 tablet 0    aspirin 81 mg chewable tablet Chew 1 tablet (81 mg total) daily 30 tablet 0    atorvastatin (LIPITOR) 40 mg tablet Take 1 tablet (40 mg total) by mouth daily 30 tablet 0    levothyroxine 88 mcg tablet  "TAKE 1 TABLET BY MOUTH DAILY 90 tablet 1    OXcarbazepine (TRILEPTAL) 150 mg tablet TAKE 1 TABLET BY MOUTH TWICE A DAY 60 tablet 5    acetaminophen (TYLENOL) 500 mg tablet Take 1,000 mg by mouth every 6 (six) hours as needed for mild pain (Patient not taking: Reported on 10/17/2024)      trolamine salicylate (ASPERCREME) 10 % cream Apply topically as needed for muscle/joint pain (Patient not taking: Reported on 10/17/2024)       No current facility-administered medications on file prior to visit.      Objective     /74 (BP Location: Left arm, Patient Position: Sitting, Cuff Size: Standard)   Pulse 103   Temp (!) 97.4 °F (36.3 °C) (Temporal)   Ht 5' 2.5\" (1.588 m)   Wt 66.1 kg (145 lb 11.2 oz)   SpO2 99%   BMI 26.22 kg/m²     Neurologic Exam    On neurological examination patient is alert, awake, oriented and in no distress. Speech is fluent without dysarthria or aphasia. Cranial nerves 2-12 were symmetrically intact bilaterally, with the exception of diminished hearing in the left ear. No evidence of any focal weakness or sensory loss in the upper or lower extremities. Motor testing reveals 5/5 strength of the bilateral upper and lower extremities.There was no pronator drift.  No fasciculations present. No abnormal involuntary movements. Finger- to-nose reveals no tremor or ataxia and intact proprioceptive function, no dysmetria was noted. Rapid alternating movement normal. Sensation was intact to vibration, light touch, and temperature in bilateral upper and lower extremities. Deep tendon reflexes were 2+ and symmetric in the bilateral upper and lower extremities. She is able to rise easily without assistance from a seated position. Casual gait is normal including stance, stride, and arm swing. Romberg is absent.    Administrative Statements     I have spent a total time of 50 minutes in caring for this patient on the day of the visit/encounter including Diagnostic results, Prognosis, Risks and benefits " of tx options, Instructions for management, Patient and family education, Importance of tx compliance, Risk factor reductions, Impressions, Counseling / Coordination of care, Documenting in the medical record, Reviewing / ordering tests, medicine, procedures  , and Obtaining or reviewing history  .

## 2024-10-17 NOTE — PROGRESS NOTES
PT Evaluation     Today's date: 10/17/2024  Patient name: Krupa Baxter  : 1950  MRN: [id number]  Referring provider: Deya Hodge CRNP  Dx:   Encounter Diagnosis     ICD-10-CM    1. Chronic right-sided thoracic back pain  M54.6     G89.29                      Assessment  Impairments: activity intolerance, impaired physical strength, lacks appropriate home exercise program and pain with function    Assessment details: Patient presents to PT for evaluation with c/c of R sided thoracic/flank region pain. Pain began in July, and initially was also present anterior rib area, which resolved with removal of Gallbladder, but R sided thoracic/intercostal region persists. Evaluation reveals good overall ROM and strength, some deficits in scapular region strength. Patient TTP just below inferior angle of scapula extending into R intercostal region. Patient interested in HEP to improve condition. Established HEP with patient focusing on gentle thoracic mobility and scapular strength. Instructed patient to trial for 4-6 weeks, and reach out with any questions. If symptoms persist after this period, recommended patient reach out to referring provider. Patient in agreement with plan.   Understanding of Dx/Px/POC: good     Prognosis: good    Goals  ST) Education: Patient to demonstrate compliance with attendance of therapy sessions and performance of introductory HEP.   2) Pain: Patient to report day to day pain levels <4/10, allowing for improvements with various functional tasks including ambulation tolerance.     LT) Education: Patient to demonstrate compliance with attendance of therapy sessions and performance of progressive HEP, allowing for transition to self-management of symptoms.  2) Pain: Patient to report <1/10 pain during her day to day activities, allowing for improvements with various functional tasks including ambulation/standing tolerance.   3) FOTO: Patient to score >/= expected FOTO  score, signifying improvements in functional abilities.       Plan  Patient would benefit from: skilled physical therapy    Planned therapy interventions: activity modification, flexibility, functional ROM exercises, home exercise program, therapeutic exercise, therapeutic activities, stretching and strengthening    Plan details: Trial HEP, F/U if needed      Subjective Evaluation    History of Present Illness  Mechanism of injury: Patient presents to PT with c/c of R sided thoracic/flank region pain, onset ~July. Patient initially had symptoms wrapping anteriorly to anterior rib area, but had Gallbladder removed two-three weeks ago and this resolved anterior pain. Patient notes increase in symptoms with ambulation. Patient notes that walking can increase pain in this region, no problems sleeping since having Gallbladder removed. Will use Aspercreme and Mg Spray at times. No bladder dysfunction. X-ray order, not performed yet. Patient would like to trial PT prior to further interventions.   Patient Goals  Patient goals for therapy: decreased pain        Objective     Tenderness     Additional Tenderness Details  TTP inferior to inferior angle of scapula, wrapping to intercostal region R side    Active Range of Motion   Cervical/Thoracic Spine       Thoracic    Flexion:  WFL  Extension:  WFL  Left lateral flexion:  WFL  Right lateral flexion:  WFL  Left rotation:  WFL  Right rotation:  WFL    Strength/Myotome Testing     Right Shoulder     Planes of Motion   Extension: 4+     Isolated Muscles   Lower trapezius: 4   Middle trapezius: 4              Precautions: R sided thoracic/flank pain      Manuals 10/17                                                                Neuro Re-Ed                                                                                                        Ther Ex             Thoracic S/L open book 10x B/L            Resisted TB row, extension, horizontal abd 2 x 10 ea  RB             Education 10'                                                                             Ther Activity                                       Gait Training                                       Modalities

## 2024-10-17 NOTE — PATIENT INSTRUCTIONS
- Continue with good blood pressure control; I would recommend monitoring at home at least 3 times per week; Goal of <130/80  - Continue with good cholesterol control; Goal LDL <70  - Continue with good blood sugar control; Goal HgbA1c <7.0  - Will defer monitoring of cholesterol and blood sugar and management of hypertensive medications to the primary care provider  - Stay well hydrated by drinking enough water   - Complete repeat MRI Brain to evaluate for micro hemorrhages in March 2025  - Eat a healthy diet, high in lean meats fish, turkey, chicken. Low in fats, cholesterol, sugars and sodium. Avoid canned foods,  get lets of fresh/frozen vegetables/fruits.  - Get routine exercise/physical activity as much as able to tolerate  - Keep follow ups with your other health care providers  - Referral to Neurosurgery for evaluation of ICA aneurysms   - Continue Aspirin 81 mg daily and Lipitor 40 mg daily.  - Fall precautions    I will plan for her to return to the office in 5 months time (after MRI is complete) but would be happy to see her sooner if the need should arise.  If she has any symptoms concerning for TIA or stroke including sudden painless loss of vision or double vision, difficulty speaking or swallowing, vertigo/room spinning that does not quickly resolve, or weakness/numbness affecting 1 side of the face or body she should proceed by ambulance to the nearest emergency room immediately.

## 2024-10-21 ENCOUNTER — APPOINTMENT (EMERGENCY)
Dept: CT IMAGING | Facility: HOSPITAL | Age: 74
End: 2024-10-21
Payer: MEDICARE

## 2024-10-21 ENCOUNTER — APPOINTMENT (EMERGENCY)
Dept: RADIOLOGY | Facility: HOSPITAL | Age: 74
End: 2024-10-21
Payer: MEDICARE

## 2024-10-21 ENCOUNTER — HOSPITAL ENCOUNTER (OUTPATIENT)
Facility: HOSPITAL | Age: 74
Setting detail: OBSERVATION
Discharge: HOME | End: 2024-10-22
Attending: EMERGENCY MEDICINE | Admitting: INTERNAL MEDICINE
Payer: MEDICARE

## 2024-10-21 DIAGNOSIS — R07.89 CHEST WALL PAIN: Primary | ICD-10-CM

## 2024-10-21 DIAGNOSIS — R06.02 SHORTNESS OF BREATH: ICD-10-CM

## 2024-10-21 DIAGNOSIS — D72.829 LEUKOCYTOSIS: ICD-10-CM

## 2024-10-21 DIAGNOSIS — J18.9 PNEUMONIA: ICD-10-CM

## 2024-10-21 PROBLEM — I67.1 INTERNAL CAROTID ANEURYSM: Status: ACTIVE | Noted: 2024-10-21

## 2024-10-21 PROBLEM — E85.4 CEREBRAL AMYLOID ANGIOPATHY  (HCC): Status: ACTIVE | Noted: 2024-10-21

## 2024-10-21 PROBLEM — I68.0 CEREBRAL AMYLOID ANGIOPATHY  (HCC): Status: ACTIVE | Noted: 2024-10-21

## 2024-10-21 PROBLEM — Z86.73 HISTORY OF TIA (TRANSIENT ISCHEMIC ATTACK): Status: ACTIVE | Noted: 2024-09-03

## 2024-10-21 LAB
4HR DELTA HS TROPONIN: 0 NG/L
ALBUMIN SERPL BCG-MCNC: 4.2 G/DL (ref 3.5–5)
ALP SERPL-CCNC: 70 U/L (ref 34–104)
ALT SERPL W P-5'-P-CCNC: 16 U/L (ref 7–52)
ANION GAP SERPL CALCULATED.3IONS-SCNC: 8 MMOL/L (ref 4–13)
APTT PPP: 25 SECONDS (ref 23–34)
AST SERPL W P-5'-P-CCNC: 18 U/L (ref 13–39)
BASOPHILS # BLD AUTO: 0.09 THOUSANDS/ΜL (ref 0–0.1)
BASOPHILS NFR BLD AUTO: 1 % (ref 0–1)
BILIRUB SERPL-MCNC: 0.41 MG/DL (ref 0.2–1)
BNP SERPL-MCNC: 31 PG/ML (ref 0–100)
BUN SERPL-MCNC: 16 MG/DL (ref 5–25)
CALCIUM SERPL-MCNC: 9.3 MG/DL (ref 8.4–10.2)
CARDIAC TROPONIN I PNL SERPL HS: 5 NG/L
CARDIAC TROPONIN I PNL SERPL HS: 5 NG/L
CHLORIDE SERPL-SCNC: 103 MMOL/L (ref 96–108)
CO2 SERPL-SCNC: 30 MMOL/L (ref 21–32)
CREAT SERPL-MCNC: 0.91 MG/DL (ref 0.6–1.3)
EOSINOPHIL # BLD AUTO: 0.1 THOUSAND/ΜL (ref 0–0.61)
EOSINOPHIL NFR BLD AUTO: 1 % (ref 0–6)
ERYTHROCYTE [DISTWIDTH] IN BLOOD BY AUTOMATED COUNT: 13.6 % (ref 11.6–15.1)
GFR SERPL CREATININE-BSD FRML MDRD: 63 ML/MIN/1.73SQ M
GLUCOSE SERPL-MCNC: 102 MG/DL (ref 65–140)
HCT VFR BLD AUTO: 39.7 % (ref 34.8–46.1)
HGB BLD-MCNC: 12.8 G/DL (ref 11.5–15.4)
IMM GRANULOCYTES # BLD AUTO: 0.04 THOUSAND/UL (ref 0–0.2)
IMM GRANULOCYTES NFR BLD AUTO: 0 % (ref 0–2)
INR PPP: 0.96 (ref 0.85–1.19)
LIPASE SERPL-CCNC: 10 U/L (ref 11–82)
LYMPHOCYTES # BLD AUTO: 3.79 THOUSANDS/ΜL (ref 0.6–4.47)
LYMPHOCYTES NFR BLD AUTO: 28 % (ref 14–44)
MCH RBC QN AUTO: 31.4 PG (ref 26.8–34.3)
MCHC RBC AUTO-ENTMCNC: 32.2 G/DL (ref 31.4–37.4)
MCV RBC AUTO: 98 FL (ref 82–98)
MONOCYTES # BLD AUTO: 1.27 THOUSAND/ΜL (ref 0.17–1.22)
MONOCYTES NFR BLD AUTO: 9 % (ref 4–12)
NEUTROPHILS # BLD AUTO: 8.15 THOUSANDS/ΜL (ref 1.85–7.62)
NEUTS SEG NFR BLD AUTO: 61 % (ref 43–75)
NRBC BLD AUTO-RTO: 0 /100 WBCS
PLATELET # BLD AUTO: 292 THOUSANDS/UL (ref 149–390)
PMV BLD AUTO: 9.7 FL (ref 8.9–12.7)
POTASSIUM SERPL-SCNC: 3.7 MMOL/L (ref 3.5–5.3)
PROT SERPL-MCNC: 7.3 G/DL (ref 6.4–8.4)
PROTHROMBIN TIME: 13.5 SECONDS (ref 12.3–15)
RBC # BLD AUTO: 4.07 MILLION/UL (ref 3.81–5.12)
SODIUM SERPL-SCNC: 141 MMOL/L (ref 135–147)
WBC # BLD AUTO: 13.44 THOUSAND/UL (ref 4.31–10.16)

## 2024-10-21 PROCEDURE — 80053 COMPREHEN METABOLIC PANEL: CPT | Performed by: EMERGENCY MEDICINE

## 2024-10-21 PROCEDURE — 96374 THER/PROPH/DIAG INJ IV PUSH: CPT

## 2024-10-21 PROCEDURE — 74176 CT ABD & PELVIS W/O CONTRAST: CPT

## 2024-10-21 PROCEDURE — 83880 ASSAY OF NATRIURETIC PEPTIDE: CPT

## 2024-10-21 PROCEDURE — 85025 COMPLETE CBC W/AUTO DIFF WBC: CPT | Performed by: EMERGENCY MEDICINE

## 2024-10-21 PROCEDURE — 96361 HYDRATE IV INFUSION ADD-ON: CPT

## 2024-10-21 PROCEDURE — 96376 TX/PRO/DX INJ SAME DRUG ADON: CPT

## 2024-10-21 PROCEDURE — 83690 ASSAY OF LIPASE: CPT | Performed by: EMERGENCY MEDICINE

## 2024-10-21 PROCEDURE — 71045 X-RAY EXAM CHEST 1 VIEW: CPT

## 2024-10-21 PROCEDURE — 36415 COLL VENOUS BLD VENIPUNCTURE: CPT

## 2024-10-21 PROCEDURE — 84484 ASSAY OF TROPONIN QUANT: CPT | Performed by: EMERGENCY MEDICINE

## 2024-10-21 PROCEDURE — 99285 EMERGENCY DEPT VISIT HI MDM: CPT

## 2024-10-21 PROCEDURE — 85610 PROTHROMBIN TIME: CPT

## 2024-10-21 PROCEDURE — 84145 PROCALCITONIN (PCT): CPT

## 2024-10-21 PROCEDURE — 85730 THROMBOPLASTIN TIME PARTIAL: CPT

## 2024-10-21 PROCEDURE — 96375 TX/PRO/DX INJ NEW DRUG ADDON: CPT

## 2024-10-21 PROCEDURE — 71275 CT ANGIOGRAPHY CHEST: CPT

## 2024-10-21 PROCEDURE — 93005 ELECTROCARDIOGRAM TRACING: CPT

## 2024-10-21 RX ORDER — HYDROMORPHONE HCL/PF 1 MG/ML
0.5 SYRINGE (ML) INJECTION ONCE
Status: COMPLETED | OUTPATIENT
Start: 2024-10-21 | End: 2024-10-21

## 2024-10-21 RX ORDER — OXCARBAZEPINE 150 MG/1
150 TABLET, FILM COATED ORAL ONCE
Status: COMPLETED | OUTPATIENT
Start: 2024-10-22 | End: 2024-10-22

## 2024-10-21 RX ORDER — AMLODIPINE BESYLATE 5 MG/1
5 TABLET ORAL ONCE
Status: COMPLETED | OUTPATIENT
Start: 2024-10-22 | End: 2024-10-22

## 2024-10-21 RX ORDER — FAMOTIDINE 10 MG/ML
20 INJECTION, SOLUTION INTRAVENOUS ONCE
Status: COMPLETED | OUTPATIENT
Start: 2024-10-21 | End: 2024-10-22

## 2024-10-21 RX ORDER — ONDANSETRON 2 MG/ML
4 INJECTION INTRAMUSCULAR; INTRAVENOUS ONCE
Status: DISCONTINUED | OUTPATIENT
Start: 2024-10-21 | End: 2024-10-22 | Stop reason: HOSPADM

## 2024-10-21 RX ADMIN — FAMOTIDINE 20 MG: 10 INJECTION INTRAVENOUS at 23:58

## 2024-10-21 RX ADMIN — HYDROMORPHONE HYDROCHLORIDE 0.5 MG: 1 INJECTION, SOLUTION INTRAMUSCULAR; INTRAVENOUS; SUBCUTANEOUS at 19:26

## 2024-10-21 RX ADMIN — IOHEXOL 85 ML: 350 INJECTION, SOLUTION INTRAVENOUS at 20:22

## 2024-10-21 RX ADMIN — HYDROMORPHONE HYDROCHLORIDE 0.5 MG: 1 INJECTION, SOLUTION INTRAMUSCULAR; INTRAVENOUS; SUBCUTANEOUS at 22:44

## 2024-10-21 RX ADMIN — SODIUM CHLORIDE 1000 ML: 0.9 INJECTION, SOLUTION INTRAVENOUS at 23:51

## 2024-10-21 NOTE — ASSESSMENT & PLAN NOTE
Krupa Baxter is a 74 year old female who had an episode 9/3/24 of right facial weakness, RUE incoordination, RUE/RLE weakness, and expressive aphasia concerning for TIA. Her stroke work up was benign however her MRI did reveal hemosiderin staining along the sulci of the left superior frontal lobe and scattered areas of peripherally located remote blood products concerning for CAA, making possibility of amyloid spell as etiology of her episode unable to be ruled out. Given possibility of TIA, she was advised to start Aspirin 81 mg daily and her atorvastatin was increased from 20 mg to 40 mg. She denies any reoccurrence of episode or symptoms since discharge. Her neurologic exam is intact and non focal. She did complete a routine EEG outpatient which was without epileptiform discharges concerning for seizure. Today we discussed the importance of tight BP control going forward due to possibility of CAA, as well as discussed fall precautions to avoid head trauma. Given she has just started Aspirin, we will plan to repeat MRI Brain in 6 months (3/2025) to assess for stability of microhemorrhages while on Aspirin therapy. She was also referred to neurosurgery for further evaluation of 0.3 cm aneurysm in left ICA supraclinoid segment projecting inferomedially and 0.2 cm aneurysm in right ICA supraclinoid segment projecting inferomedially. We reviewed her stroke risk factors and management of the same. We also discussed possibility of JESSICA given her report of loud snoring and mouth breathing through the night. I recommended she consider a sleep study to rule out JESSICA and discussed risks of untreated JESSICA including CAD and CVA. She declines to pursue testing at this time despite education provided. She was provided stroke education and we reviewed stroke warning signs/symptoms and reasons to return by ambulance to the ER. She will follow up in 5 months; sooner if needed.      Orders:    Ambulatory referral to Neurology

## 2024-10-21 NOTE — ED PROVIDER NOTES
Time reflects when diagnosis was documented in both MDM as applicable and the Disposition within this note       Time User Action Codes Description Comment    10/22/2024 12:41 AM Dashawn Tabor [R07.9] Chest pain     10/22/2024 12:42 AM Dashawn Tabor [J18.9] Pneumonia     10/22/2024 12:42 AM Dashawn Tabor [D72.829] Leukocytosis     10/22/2024  1:27 AM Dashawn Tabor [R06.02] Shortness of breath           ED Disposition       ED Disposition   Admit    Condition   Stable    Date/Time   Tue Oct 22, 2024 12:41 AM    Comment   Case was discussed with MANOHAR and the patient's admission status was agreed to be Admission Status: observation status to the service of Dr. Hunt.               Assessment & Plan       Medical Decision Making  74-year-old female presents to ED for evaluation of chest pain, shortness of breath as seen in HPI.  On physical examination patient tachycardic at 109 bpm.  Mildly tachypneic at 24 respirations per minute.  Hypertensive at 180/83.  Oxygenation of 97%.  Patient appears uncomfortable.  Holding chest.  No murmur.  Unable to take deep breaths without significant pain.  Extremities well-perfused.  Nontoxic-appearing.  Abdomen soft, nontender.  Pupils equal and reactive.  Alert and responding to questions appropriately.  Given description of chest pain with tachycardia, recent surgery, age have high concern for pulmonary embolism.  Obtained workup consisting of CBC, CMP, lipase, AP TT, pro time INR, BNP, EKG, CTA PE study.  Dilaudid for pain control.  Differential diagnosis includes but not limited to pulmonary embolism, MI, ACS, pneumonia, pneumothorax, tension pneumothorax, postoperative complication from cholecystectomy, arrhythmia, electrolyte abnormality, anxiety, intercostal strain    CBC returned with leukocytosis of 13.44.  Troponin of 5 NG/L at 0 hours.  2-hour troponin of 5 NG/mL.  4-hour troponin of 4 NG/mL.  Pro time INR WNL.  aPTT WNL.  Lipase WNL.  CMP WNL.  EKG  without evidence of acute cardiac injury, arrhythmia besides tachycardia.  PE study without pulmonary embolism.  Areas of groundglass opacity of bilateral lower lobes suggesting possible developing pneumonia.  Patient does not have clear symptoms of pneumonia with exception for chest pain.  She is afebrile, no cough, no sore throat, no nasal congestion, no bodyaches, no weakness.  Pulmonary nodules incidentally seen on imaging and discussed with patient.  Follow-up with primary care provider for reimaging advised.    Initially patient did obtain some pain control with first dose of Dilaudid but this wore off.  Provided second dose of Dilaudid.  Obtained further imaging consisting of CT abdomen pelvis without contrast given persistence of symptoms despite relatively unremarkable workup.  CT abdomen pelvis without contrast returned without findings to explain her significant pain.  Of note nursing mentions that patient's oxygen dips into the upper 80%'s  at rest.  Patient placed on 2 L nasal cannula.  Oxygenation improved.    Given patient's persistent pain, possible early pneumonia formation on PE study, leukocytosis, age, comorbidities, hypoxia on room air reached out to [facility] for admission.  Patient to be admitted to [facility] service.  Obtaining further workup consisting of procalcitonin, lactic acid with reflex, blood culture.  Ceftriaxone for possible pneumonia.  Patient agreeable to admission.    Amount and/or Complexity of Data Reviewed  Labs: ordered.  Radiology: ordered.    Risk  Prescription drug management.  Decision regarding hospitalization.             Medications   ondansetron (ZOFRAN) injection 4 mg (4 mg Intravenous Not Given 10/21/24 2013)   ceftriaxone (ROCEPHIN) 1 g/50 mL in dextrose IVPB (has no administration in time range)   HYDROmorphone (DILAUDID) injection 0.5 mg (0.5 mg Intravenous Given 10/21/24 1926)   iohexol (OMNIPAQUE) 350 MG/ML injection (MULTI-DOSE) 85 mL (85 mL Intravenous Given 10/21/24  2022)   HYDROmorphone (DILAUDID) injection 0.5 mg (0.5 mg Intravenous Given 10/21/24 2244)   sodium chloride 0.9 % bolus 1,000 mL (1,000 mL Intravenous New Bag 10/21/24 2351)   Famotidine (PF) (PEPCID) injection 20 mg (20 mg Intravenous Given 10/21/24 2358)   OXcarbazepine (TRILEPTAL) tablet 150 mg (150 mg Oral Given 10/22/24 0036)   amLODIPine (NORVASC) tablet 5 mg (5 mg Oral Given 10/22/24 0035)       ED Risk Strat Scores   HEART Risk Score      Flowsheet Row Most Recent Value   Heart Score Risk Calculator    History 2 Filed at: 10/21/2024 2238   ECG 1 Filed at: 10/21/2024 2238   Age 2 Filed at: 10/21/2024 2238   Risk Factors 2 Filed at: 10/21/2024 2238   Troponin 0 Filed at: 10/21/2024 2238   HEART Score 7 Filed at: 10/21/2024 2238                                   Wells' Criteria for PE      Flowsheet Row Most Recent Value   Wells' Criteria for PE    Clinical signs and symptoms of DVT 0 Filed at: 10/21/2024 1922   PE is primary diagnosis or equally likely 3 Filed at: 10/21/2024 1922   HR >100 1.5 Filed at: 10/21/2024 1922   Immobilization at least 3 days or Surgery in the previous 4 weeks 1.5 Filed at: 10/21/2024 1922   Previous, objectively diagnosed PE or DVT 0 Filed at: 10/21/2024 1922   Hemoptysis 0 Filed at: 10/21/2024 1922   Malignancy with treatment within 6 months or palliative 0 Filed at: 10/21/2024 1922   Wells' Criteria Total 6 Filed at: 10/21/2024 1922                        History of Present Illness       Chief Complaint   Patient presents with    Chest Pain     Pt reprots increasing pain under left breast after working outside today. Pt had gallbladder removed 4 weeks ago       Past Medical History:   Diagnosis Date    Acoustic neuroma (HCC)     left ear, decreased hearing    Arthritis     right knee    Disease of thyroid gland     History of radiation therapy 2007    Left acoustic neuroma    Hyperlipidemia     TIA (transient ischemic attack) 9/3/2024    Lasted about 2 hours    Trigeminal  neuralgia of left side of face     Urethral hypermobility     Uterine prolapse       Past Surgical History:   Procedure Laterality Date    COLONOSCOPY      AK CMBND ANTERPOST COLPORRAPHY W/CYSTO N/A 1/8/2019    Procedure: A&P COLPORRHAPHY;  Surgeon: Baron Coyle MD;  Location: [facility];  Service: UroGynecology           AK COLPOPEXY VAGINAL EXTRAPERITONEAL APPROACH N/A 1/8/2019    Procedure: ANTERIOR VE COLPOPEXY;  Surgeon: Baron Coyle MD;  Location: [facility];  Service: UroGynecology           AK CYSTOURETHROSCOPY N/A 1/8/2019    Procedure: CYSTOSCOPY;  Surgeon: Baron Coyle MD;  Location[facility];  Service: UroGynecology           AK LAPAROSCOPY SURG CHOLECYSTECTOMY N/A 9/26/2024    Procedure: CHOLECYSTECTOMY LAPAROSCOPIC;  Surgeon: Hernando Wright DO;  [facility]: [facility];  Service: General    AK SLING OPERATION STRESS INCONTINENCE N/A 1/8/2019    Procedure: PUBOVAGINAL SLING;  Surgeon: Baron Coyle MD;  Location: [facility];  Service: UroGynecology           TUBAL LIGATION      WISDOM TOOTH EXTRACTION        Family History   Problem Relation Age of Onset    Arthritis Family     Diabetes Family     Hypertension Family     Osteoporosis Family     Kidney disease Family     Thyroid disease Family     Hypertension Mother     Diabetes Father     Asthma Father     Lung disease Father     Thyroid disease Sister     Thyroid disease Daughter     Mental illness Neg Hx     Substance Abuse Neg Hx     Alcohol abuse Neg Hx     Depression Neg Hx       Social History     Tobacco Use    Smoking status: Never    Smokeless tobacco: Never   Vaping Use    Vaping status: Never Used   Substance Use Topics    Alcohol use: Yes     Comment: Used very seldom    Drug use: No      E-Cigarette/Vaping    E-Cigarette Use Never User       E-Cigarette/Vaping Substances    Nicotine No     THC No     CBD No     Flavoring No     Other No     Unknown No       I have reviewed and agree with the history as documented.      74-year-old female with history of hypertension, hypercholesterolemia, hypothyroidism, TIA, internal carotid aneurysm presents to ED for evaluation of chest pain.  Patient states that this afternoon she was working in her yard.  While working in her yard she suddenly developed a very severe chest pain underneath left breast.  Pain increases with deep breaths.  Denies any excessive straining, heavy lifting to cause a muscular source of the chest pain.  Denies history of PE, DVT.  Did have a TIA in early September 2024 which she was evaluated for and follows with neurology.  Only takes aspirin currently.  Patient also recently had her gallbladder removed on September 26, 2024.  Denies any pain at site of surgery.  Denies hemoptysis, recent long plane rides, recent long car rides, recent treatment for malignancy.         Review of Systems   Constitutional:  Negative for fever.   HENT:  Negative for congestion.    Respiratory:  Positive for chest tightness and shortness of breath. Negative for cough, wheezing and stridor.    Cardiovascular:  Positive for chest pain. Negative for leg swelling.   All other systems reviewed and are negative.                Objective       ED Triage Vitals   Temperature Pulse Blood Pressure Respirations SpO2 Patient Position - Orthostatic VS   10/21/24 1841 10/21/24 1841 10/21/24 1841 10/21/24 1841 10/21/24 1841 10/21/24 1930   98.2 °F (36.8 °C) (!) 109 (!) 180/83 (!) 24 97 % Sitting      Temp src Heart Rate Source BP Location FiO2 (%) Pain Score    -- 10/21/24 1900 10/21/24 2100 -- 10/21/24 1841     Monitor Left arm  9      Vitals      Date and Time Temp Pulse SpO2 Resp BP Pain Score FACES Pain Rating User   10/22/24 0035 -- -- -- -- 136/66 -- -- CG   10/22/24 0000 -- 95 97 % 18 139/66 -- -- CG   10/21/24 2255 -- -- 97 % -- -- -- -- CG   10/21/24 2251 -- 98 87 % -- -- -- -- CG   10/21/24 2244 -- -- -- -- -- 9 -- CG   10/21/24 2240 -- 102 95 % 20 146/73 -- -- KK   10/21/24 2130 -- 92 99  % -- 141/65 -- --    10/21/24 2100 -- 93 99 % 20 141/65 -- --    10/21/24 2000 -- 101 97 % 22 152/69 8 --    10/21/24 1930 -- 109 97 % 24 176/88 -- --    10/21/24 1926 -- -- -- -- -- 10 - Worst Possible Pain --    10/21/24 1900 -- 101 96 % -- 156/72 -- --    10/21/24 1841 98.2 °F (36.8 °C) 109 97 % 24 180/83 9 -- RLN            Physical Exam  Vitals and nursing note reviewed.   Constitutional:       General: She is in acute distress.      Appearance: She is well-developed. She is ill-appearing. She is not toxic-appearing or diaphoretic.   HENT:      Head: Normocephalic and atraumatic.   Eyes:      Extraocular Movements: Extraocular movements intact.      Conjunctiva/sclera: Conjunctivae normal.      Pupils: Pupils are equal, round, and reactive to light.   Cardiovascular:      Rate and Rhythm: Regular rhythm. Tachycardia present.      Heart sounds: Normal heart sounds. No murmur heard.  Pulmonary:      Effort: Pulmonary effort is normal. No tachypnea, accessory muscle usage or respiratory distress.      Breath sounds: Normal breath sounds. No stridor. No decreased breath sounds, wheezing, rhonchi or rales.   Abdominal:      Palpations: Abdomen is soft.      Tenderness: There is no abdominal tenderness.   Musculoskeletal:         General: No swelling.      Cervical back: Neck supple.      Right lower leg: No tenderness. No edema.      Left lower leg: No tenderness. No edema.   Skin:     General: Skin is warm and dry.      Capillary Refill: Capillary refill takes less than 2 seconds.      Coloration: Skin is not cyanotic or pale.      Findings: No rash.   Neurological:      Mental Status: She is alert.   Psychiatric:         Mood and Affect: Mood normal.         Results Reviewed       Procedure Component Value Units Date/Time    Blood culture #1 [[id number]] Collected: 10/22/24 0139    Lab Status: No result Specimen: Blood from Arm, Left     Procalcitonin [[id number]]  (Normal) Collected: 10/21/24 1847     Lab Status: Final result Specimen: Blood from Arm, Right Updated: 10/22/24 0137     Procalcitonin <0.05 ng/ml     Lactic acid, plasma (w/reflex if result > 2.0) [[id number]]  (Normal) Collected: 10/22/24 0056    Lab Status: Final result Specimen: Blood from Arm, Right Updated: 10/22/24 0127     LACTIC ACID 1.0 mmol/L     Narrative:      Result may be elevated if tourniquet was used during collection.    Blood culture #2 [[id number]] Collected: 10/22/24 0056    Lab Status: In process Specimen: Blood from Arm, Right Updated: 10/22/24 0107    HS Troponin I 2hr [[id number]]  (Normal) Collected: 10/21/24 2350    Lab Status: Final result Specimen: Blood from Arm, Right Updated: 10/22/24 0025     hs TnI 2hr 4 ng/L      Delta 2hr hsTnI -1 ng/L     UA w Reflex to Microscopic w Reflex to Culture [[id number]]     Lab Status: No result Specimen: Urine     HS Troponin I 4hr [[id number]]  (Normal) Collected: 10/21/24 2122    Lab Status: Final result Specimen: Blood from Arm, Right Updated: 10/21/24 2151     hs TnI 4hr 5 ng/L      Delta 4hr hsTnI 0 ng/L     B-Type Natriuretic Peptide(BNP) [[id number]]  (Normal) Collected: 10/21/24 1847    Lab Status: Final result Specimen: Blood from Arm, Right Updated: 10/21/24 2049     BNP 31 pg/mL     APTT [[id number]]  (Normal) Collected: 10/21/24 1847    Lab Status: Final result Specimen: Blood from Arm, Right Updated: 10/21/24 1956     PTT 25 seconds     Protime-INR [[id number]]  (Normal) Collected: 10/21/24 1847    Lab Status: Final result Specimen: Blood from Arm, Right Updated: 10/21/24 1956     Protime 13.5 seconds      INR 0.96    Narrative:      INR Therapeutic Range    Indication                                             INR Range      Atrial Fibrillation                                               2.0-3.0  Hypercoagulable State                                    2.0.2.3  Left Ventricular Asist Device                            2.0-3.0  Mechanical Heart Valve                                   -    Aortic(with afib, MI, embolism, HF, LA enlargement,    and/or coagulopathy)                                     2.0-3.0 (2.5-3.5)     Mitral                                                             2.5-3.5  Prosthetic/Bioprosthetic Heart Valve               2.0-3.0  Venous thromboembolism (VTE: VT, PE        2.0-3.0    Comprehensive metabolic panel [[id number]] Collected: 10/21/24 1847    Lab Status: Final result Specimen: Blood from Arm, Right Updated: 10/21/24 1922     Sodium 141 mmol/L      Potassium 3.7 mmol/L      Chloride 103 mmol/L      CO2 30 mmol/L      ANION GAP 8 mmol/L      BUN 16 mg/dL      Creatinine 0.91 mg/dL      Glucose 102 mg/dL      Calcium 9.3 mg/dL      AST 18 U/L      ALT 16 U/L      Alkaline Phosphatase 70 U/L      Total Protein 7.3 g/dL      Albumin 4.2 g/dL      Total Bilirubin 0.41 mg/dL      eGFR 63 ml/min/1.73sq m     Narrative:      National Kidney Disease Foundation guidelines for Chronic Kidney Disease (CKD):     Stage 1 with normal or high GFR (GFR > 90 mL/min/1.73 square meters)    Stage 2 Mild CKD (GFR = 60-89 mL/min/1.73 square meters)    Stage 3A Moderate CKD (GFR = 45-59 mL/min/1.73 square meters)    Stage 3B Moderate CKD (GFR = 30-44 mL/min/1.73 square meters)    Stage 4 Severe CKD (GFR = 15-29 mL/min/1.73 square meters)    Stage 5 End Stage CKD (GFR <15 mL/min/1.73 square meters)  Note: GFR calculation is accurate only with a steady state creatinine    Lipase [[id number]]  (Abnormal) Collected: 10/21/24 1847    Lab Status: Final result Specimen: Blood from Arm, Right Updated: 10/21/24 1922     Lipase 10 u/L     HS Troponin 0hr (reflex protocol) [[id number]]  (Normal) Collected: 10/21/24 1847    Lab Status: Final result Specimen: Blood from Arm, Right Updated: 10/21/24 1921     hs TnI 0hr 5 ng/L     CBC and differential [[id number]]  (Abnormal) Collected: 10/21/24 1847    Lab Status: Final result Specimen: Blood from Arm, Right Updated: 10/21/24 1856     WBC 13.44  Thousand/uL      RBC 4.07 Million/uL      Hemoglobin 12.8 g/dL      Hematocrit 39.7 %      MCV 98 fL      MCH 31.4 pg      MCHC 32.2 g/dL      RDW 13.6 %      MPV 9.7 fL      Platelets 292 Thousands/uL      nRBC 0 /100 WBCs      Segmented % 61 %      Immature Grans % 0 %      Lymphocytes % 28 %      Monocytes % 9 %      Eosinophils Relative 1 %      Basophils Relative 1 %      Absolute Neutrophils 8.15 Thousands/µL      Absolute Immature Grans 0.04 Thousand/uL      Absolute Lymphocytes 3.79 Thousands/µL      Absolute Monocytes 1.27 Thousand/µL      Eosinophils Absolute 0.10 Thousand/µL      Basophils Absolute 0.09 Thousands/µL             CT abdomen pelvis wo contrast   Final Interpretation by Ángel Damian DO (10/22 0011)      No acute findings in the abdomen or pelvis within the limits of unenhanced technique.      Subsegmental atelectatic changes versus scarring at the lingula and both lower lobes. Possibility of developing pneumonia, particularly at the lingula and left lower lobe is not excluded. Correlate clinically.      There is a cluster of complex cysts or complex cyst with septation at the anterior left mid pole measuring up to 24 mm (coronal image 119). Recommend dedicated ultrasound for more definitive evaluation.      Other findings as detailed above.         Workstation performed: [id number]         CTA ED Chest PE Study   Final Interpretation by Ángel Damian DO (10/21 2126)      No definite pulmonary artery embolus.      Areas of groundglass opacity at the lingula and bilateral lower lobes favored to be on the basis of atelectasis, however possibility of developing pneumonia is not excluded. Correlate clinically.      Region of groundglass opacities with associated micronodules at the right upper lobe 304/55, with the largest nodule measuring to 5 mm (304/55). Findings are likely of an infectious/inflammatory etiology. Consider follow-up chest CT in 3 months to assess    for stability or  interval change.      There is a 4 mm nodule at the inferior segment of the right upper lobe (304/106). Based on current Fleischner Society 2017 Guidelines on incidental pulmonary nodule, optional follow-up CT at 12 months can be considered.         Other findings as detailed above.                     Workstation performed: [id number]         XR chest 1 view portable    (Results Pending)       ECG 12 Lead Documentation Only    Date/Time: 10/21/2024 6:43 PM    Performed by: Dashawn Tabor PA-C  Authorized by: Dashawn Tabor PA-C    Indications / Diagnosis:  Chest pain  Patient location:  ED  Previous ECG:     Previous ECG:  Compared to current    Similarity:  No change  Interpretation:     Interpretation: non-specific    Rate:     ECG rate:  106    ECG rate assessment: tachycardic    Rhythm:     Rhythm: sinus tachycardia    Ectopy:     Ectopy: none    QRS:     QRS axis:  Normal    QRS intervals:  Normal  Conduction:     Conduction: normal    ST segments:     ST segments:  Normal  T waves:     T waves: normal        ED Medication and Procedure Management   Prior to Admission Medications   Prescriptions Last Dose Informant Patient Reported? Taking?   OXcarbazepine (TRILEPTAL) 150 mg tablet 10/21/2024 at 0900 Self No Yes   Sig: TAKE 1 TABLET BY MOUTH TWICE A DAY   acetaminophen (TYLENOL) 500 mg tablet  Self Yes No   Sig: Take 1,000 mg by mouth every 6 (six) hours as needed for mild pain   Patient not taking: Reported on 10/17/2024   amLODIPine (NORVASC) 5 mg tablet 10/20/2024 Self No Yes   Sig: Take 1 tablet (5 mg total) by mouth daily   aspirin 81 mg chewable tablet  Self No No   Sig: Chew 1 tablet (81 mg total) daily   atorvastatin (LIPITOR) 40 mg tablet  Self No No   Sig: Take 1 tablet (40 mg total) by mouth daily   levothyroxine 88 mcg tablet 10/21/2024 Self No Yes   Sig: TAKE 1 TABLET BY MOUTH DAILY   trolamine salicylate (ASPERCREME) 10 % cream  Self Yes No   Sig: Apply topically as needed for  muscle/joint pain   Patient not taking: Reported on 10/17/2024      Facility-Administered Medications: None     Patient's Medications   Discharge Prescriptions    No medications on file     No discharge procedures on file.  ED SEPSIS DOCUMENTATION   Time reflects when diagnosis was documented in both MDM as applicable and the Disposition within this note       Time User Action Codes Description Comment    10/22/2024 12:41 AM Dashawn Tabor [R07.9] Chest pain     10/22/2024 12:42 AM Dashawn Tabor [J18.9] Pneumonia     10/22/2024 12:42 AM Dashawn Tabor [D72.829] Leukocytosis     10/22/2024  1:27 AM Dashawn Tabor [R06.02] Shortness of breath                  Dashawn Tabor PA-C  10/22/24 0142

## 2024-10-21 NOTE — ASSESSMENT & PLAN NOTE
Krupa Baxter is a 74 year old female who had an episode 9/3/24 of right facial weakness, RUE incoordination, RUE/RLE weakness, and expressive aphasia concerning for TIA. Her stroke work up was benign however her MRI did reveal hemosiderin staining along the sulci of the left superior frontal lobe and scattered areas of peripherally located remote blood products concerning for CAA, making possibility of amyloid spell as etiology of her episode unable to be ruled out. She denies any prior hx of uncontrolled hypertension or head trauma. Given possibility of TIA, she was advised to start Aspirin 81 mg daily. Today we discussed the importance of tight BP control going forward due to possibility of CAA, as well as discussed fall precautions to avoid head trauma. Given she has just started Aspirin, we will plan to repeat MRI Brain in 6 months (3/2025) to assess for stability of microhemorrhages while on Aspirin therapy. She will follow up in 5 months; sooner if needed.    Orders:    MRI brain without contrast; Future

## 2024-10-22 VITALS
SYSTOLIC BLOOD PRESSURE: 110 MMHG | OXYGEN SATURATION: 94 % | TEMPERATURE: 98.4 F | DIASTOLIC BLOOD PRESSURE: 57 MMHG | HEART RATE: 81 BPM | RESPIRATION RATE: 20 BRPM

## 2024-10-22 PROBLEM — J18.9 SEPSIS DUE TO PNEUMONIA (HCC): Status: ACTIVE | Noted: 2024-10-22

## 2024-10-22 PROBLEM — R91.8 LUNG NODULES: Status: ACTIVE | Noted: 2024-10-22

## 2024-10-22 PROBLEM — J96.00 ACUTE RESPIRATORY FAILURE (HCC): Status: ACTIVE | Noted: 2024-10-22

## 2024-10-22 PROBLEM — A41.9 SEPSIS DUE TO PNEUMONIA (HCC): Status: ACTIVE | Noted: 2024-10-22

## 2024-10-22 PROBLEM — R07.89 CHEST WALL PAIN: Status: ACTIVE | Noted: 2024-10-22

## 2024-10-22 PROBLEM — J96.00 ACUTE RESPIRATORY FAILURE (HCC): Status: RESOLVED | Noted: 2024-10-22 | Resolved: 2024-10-22

## 2024-10-22 PROBLEM — R07.9 CHEST PAIN: Status: ACTIVE | Noted: 2024-10-22

## 2024-10-22 PROBLEM — Z90.49 S/P CHOLECYSTECTOMY: Status: ACTIVE | Noted: 2024-10-22

## 2024-10-22 LAB
2HR DELTA HS TROPONIN: -1 NG/L
ANION GAP SERPL CALCULATED.3IONS-SCNC: 7 MMOL/L (ref 4–13)
ATRIAL RATE: 106 BPM
ATRIAL RATE: 97 BPM
BACTERIA UR QL AUTO: ABNORMAL /HPF
BILIRUB UR QL STRIP: NEGATIVE
BUN SERPL-MCNC: 12 MG/DL (ref 5–25)
CALCIUM SERPL-MCNC: 8.6 MG/DL (ref 8.4–10.2)
CARDIAC TROPONIN I PNL SERPL HS: 4 NG/L
CHLORIDE SERPL-SCNC: 104 MMOL/L (ref 96–108)
CLARITY UR: CLEAR
CO2 SERPL-SCNC: 28 MMOL/L (ref 21–32)
COLOR UR: ABNORMAL
CREAT SERPL-MCNC: 0.7 MG/DL (ref 0.6–1.3)
ERYTHROCYTE [DISTWIDTH] IN BLOOD BY AUTOMATED COUNT: 13.6 % (ref 11.6–15.1)
GFR SERPL CREATININE-BSD FRML MDRD: 86 ML/MIN/1.73SQ M
GLUCOSE SERPL-MCNC: 122 MG/DL (ref 65–140)
GLUCOSE UR STRIP-MCNC: NEGATIVE MG/DL
HCT VFR BLD AUTO: 37.2 % (ref 34.8–46.1)
HGB BLD-MCNC: 12 G/DL (ref 11.5–15.4)
HGB UR QL STRIP.AUTO: ABNORMAL
KETONES UR STRIP-MCNC: NEGATIVE MG/DL
L PNEUMO1 AG UR QL IA.RAPID: NEGATIVE
LACTATE SERPL-SCNC: 1 MMOL/L (ref 0.5–2)
LEUKOCYTE ESTERASE UR QL STRIP: NEGATIVE
MCH RBC QN AUTO: 31.7 PG (ref 26.8–34.3)
MCHC RBC AUTO-ENTMCNC: 32.3 G/DL (ref 31.4–37.4)
MCV RBC AUTO: 98 FL (ref 82–98)
MUCOUS THREADS UR QL AUTO: ABNORMAL
NITRITE UR QL STRIP: NEGATIVE
NON-SQ EPI CELLS URNS QL MICRO: ABNORMAL /HPF
P AXIS: 46 DEGREES
P AXIS: 66 DEGREES
PH UR STRIP.AUTO: 5.5 [PH]
PLATELET # BLD AUTO: 268 THOUSANDS/UL (ref 149–390)
PMV BLD AUTO: 10.2 FL (ref 8.9–12.7)
POTASSIUM SERPL-SCNC: 3.5 MMOL/L (ref 3.5–5.3)
PR INTERVAL: 148 MS
PR INTERVAL: 148 MS
PROCALCITONIN SERPL-MCNC: <0.05 NG/ML
PROT UR STRIP-MCNC: NEGATIVE MG/DL
QRS AXIS: 56 DEGREES
QRS AXIS: 60 DEGREES
QRSD INTERVAL: 86 MS
QRSD INTERVAL: 88 MS
QT INTERVAL: 348 MS
QT INTERVAL: 360 MS
QTC INTERVAL: 457 MS
QTC INTERVAL: 462 MS
RBC # BLD AUTO: 3.79 MILLION/UL (ref 3.81–5.12)
RBC #/AREA URNS AUTO: ABNORMAL /HPF
S PNEUM AG UR QL: NEGATIVE
SODIUM SERPL-SCNC: 139 MMOL/L (ref 135–147)
SP GR UR STRIP.AUTO: >=1.05 (ref 1–1.03)
T WAVE AXIS: 44 DEGREES
T WAVE AXIS: 56 DEGREES
UROBILINOGEN UR STRIP-ACNC: <2 MG/DL
VENTRICULAR RATE: 106 BPM
VENTRICULAR RATE: 97 BPM
WBC # BLD AUTO: 11.06 THOUSAND/UL (ref 4.31–10.16)
WBC #/AREA URNS AUTO: ABNORMAL /HPF

## 2024-10-22 PROCEDURE — 87040 BLOOD CULTURE FOR BACTERIA: CPT

## 2024-10-22 PROCEDURE — 80048 BASIC METABOLIC PNL TOTAL CA: CPT

## 2024-10-22 PROCEDURE — 81001 URINALYSIS AUTO W/SCOPE: CPT | Performed by: NURSE PRACTITIONER

## 2024-10-22 PROCEDURE — 87449 NOS EACH ORGANISM AG IA: CPT | Performed by: NURSE PRACTITIONER

## 2024-10-22 PROCEDURE — 93010 ELECTROCARDIOGRAM REPORT: CPT | Performed by: INTERNAL MEDICINE

## 2024-10-22 PROCEDURE — 36415 COLL VENOUS BLD VENIPUNCTURE: CPT

## 2024-10-22 PROCEDURE — 85027 COMPLETE CBC AUTOMATED: CPT

## 2024-10-22 PROCEDURE — 99223 1ST HOSP IP/OBS HIGH 75: CPT | Performed by: NURSE PRACTITIONER

## 2024-10-22 PROCEDURE — 83605 ASSAY OF LACTIC ACID: CPT

## 2024-10-22 PROCEDURE — 99239 HOSP IP/OBS DSCHRG MGMT >30: CPT | Performed by: INTERNAL MEDICINE

## 2024-10-22 RX ORDER — METHOCARBAMOL 500 MG/1
500 TABLET, FILM COATED ORAL EVERY 6 HOURS PRN
Qty: 30 TABLET | Refills: 0 | Status: SHIPPED | OUTPATIENT
Start: 2024-10-22 | End: 2024-10-25

## 2024-10-22 RX ORDER — KETOROLAC TROMETHAMINE 30 MG/ML
15 INJECTION, SOLUTION INTRAMUSCULAR; INTRAVENOUS EVERY 6 HOURS PRN
Status: DISCONTINUED | OUTPATIENT
Start: 2024-10-22 | End: 2024-10-22 | Stop reason: HOSPADM

## 2024-10-22 RX ORDER — AMLODIPINE BESYLATE 2.5 MG/1
5 TABLET ORAL DAILY
Status: DISCONTINUED | OUTPATIENT
Start: 2024-10-22 | End: 2024-10-22 | Stop reason: HOSPADM

## 2024-10-22 RX ORDER — LIDOCAINE 50 MG/G
1 PATCH TOPICAL DAILY
Status: DISCONTINUED | OUTPATIENT
Start: 2024-10-22 | End: 2024-10-22 | Stop reason: HOSPADM

## 2024-10-22 RX ORDER — ASPIRIN 81 MG/1
81 TABLET, CHEWABLE ORAL DAILY
Status: DISCONTINUED | OUTPATIENT
Start: 2024-10-22 | End: 2024-10-22 | Stop reason: HOSPADM

## 2024-10-22 RX ORDER — LEVOTHYROXINE SODIUM 88 UG/1
88 TABLET ORAL
Status: DISCONTINUED | OUTPATIENT
Start: 2024-10-22 | End: 2024-10-22 | Stop reason: HOSPADM

## 2024-10-22 RX ORDER — ATORVASTATIN CALCIUM 40 MG/1
40 TABLET, FILM COATED ORAL DAILY
Status: DISCONTINUED | OUTPATIENT
Start: 2024-10-22 | End: 2024-10-22 | Stop reason: HOSPADM

## 2024-10-22 RX ORDER — ALBUTEROL SULFATE 0.83 MG/ML
2.5 SOLUTION RESPIRATORY (INHALATION) EVERY 6 HOURS PRN
Status: DISCONTINUED | OUTPATIENT
Start: 2024-10-22 | End: 2024-10-22 | Stop reason: HOSPADM

## 2024-10-22 RX ORDER — ENOXAPARIN SODIUM 100 MG/ML
40 INJECTION SUBCUTANEOUS DAILY
Status: DISCONTINUED | OUTPATIENT
Start: 2024-10-22 | End: 2024-10-22 | Stop reason: HOSPADM

## 2024-10-22 RX ORDER — ACETAMINOPHEN 325 MG/1
975 TABLET ORAL EVERY 8 HOURS
Status: DISCONTINUED | OUTPATIENT
Start: 2024-10-22 | End: 2024-10-22 | Stop reason: HOSPADM

## 2024-10-22 RX ORDER — CEFUROXIME AXETIL 500 MG/1
500 TABLET ORAL EVERY 12 HOURS SCHEDULED
Qty: 8 TABLET | Refills: 0 | Status: SHIPPED | OUTPATIENT
Start: 2024-10-22 | End: 2024-10-26

## 2024-10-22 RX ORDER — OXCARBAZEPINE 150 MG/1
150 TABLET, FILM COATED ORAL 2 TIMES DAILY
Status: DISCONTINUED | OUTPATIENT
Start: 2024-10-22 | End: 2024-10-22 | Stop reason: HOSPADM

## 2024-10-22 RX ORDER — CYCLOBENZAPRINE HCL 10 MG
10 TABLET ORAL 3 TIMES DAILY
Status: DISCONTINUED | OUTPATIENT
Start: 2024-10-22 | End: 2024-10-22 | Stop reason: HOSPADM

## 2024-10-22 RX ADMIN — ATORVASTATIN CALCIUM 40 MG: 40 TABLET, FILM COATED ORAL at 08:08

## 2024-10-22 RX ADMIN — OXCARBAZEPINE 150 MG: 150 TABLET, FILM COATED ORAL at 08:08

## 2024-10-22 RX ADMIN — ACETAMINOPHEN 975 MG: 325 TABLET ORAL at 09:47

## 2024-10-22 RX ADMIN — LIDOCAINE 1 PATCH: 50 PATCH TOPICAL at 02:22

## 2024-10-22 RX ADMIN — KETOROLAC TROMETHAMINE 15 MG: 30 INJECTION, SOLUTION INTRAMUSCULAR; INTRAVENOUS at 14:22

## 2024-10-22 RX ADMIN — AMLODIPINE BESYLATE 5 MG: 5 TABLET ORAL at 00:35

## 2024-10-22 RX ADMIN — CEFTRIAXONE 1000 MG: 2 INJECTION, POWDER, FOR SOLUTION INTRAMUSCULAR; INTRAVENOUS at 01:45

## 2024-10-22 RX ADMIN — LEVOTHYROXINE SODIUM 88 MCG: 88 TABLET ORAL at 05:51

## 2024-10-22 RX ADMIN — OXCARBAZEPINE 150 MG: 150 TABLET, FILM COATED ORAL at 00:36

## 2024-10-22 RX ADMIN — CYCLOBENZAPRINE 10 MG: 10 TABLET, FILM COATED ORAL at 16:24

## 2024-10-22 RX ADMIN — ASPIRIN 81 MG 81 MG: 81 TABLET ORAL at 08:08

## 2024-10-22 RX ADMIN — CYCLOBENZAPRINE 10 MG: 10 TABLET, FILM COATED ORAL at 09:47

## 2024-10-22 RX ADMIN — ACETAMINOPHEN 975 MG: 325 TABLET ORAL at 02:21

## 2024-10-22 NOTE — PLAN OF CARE
Problem: PAIN - ADULT  Goal: Verbalizes/displays adequate comfort level or baseline comfort level  Description: Interventions:  - Encourage patient to monitor pain and request assistance  - Assess pain using appropriate pain scale  - Administer analgesics based on type and severity of pain and evaluate response  - Implement non-pharmacological measures as appropriate and evaluate response  - Consider cultural and social influences on pain and pain management  - Notify physician/advanced practitioner if interventions unsuccessful or patient reports new pain  Outcome: Progressing     Problem: INFECTION - ADULT  Goal: Absence or prevention of progression during hospitalization  Description: INTERVENTIONS:  - Assess and monitor for signs and symptoms of infection  - Monitor lab/diagnostic results  - Monitor all insertion sites, i.e. indwelling lines, tubes, and drains  - Monitor endotracheal if appropriate and nasal secretions for changes in amount and color  - Ducor appropriate cooling/warming therapies per order  - Administer medications as ordered  - Instruct and encourage patient and family to use good hand hygiene technique  - Identify and instruct in appropriate isolation precautions for identified infection/condition  Outcome: Progressing  Goal: Absence of fever/infection during neutropenic period  Description: INTERVENTIONS:  - Monitor WBC    Outcome: Progressing     Problem: SAFETY ADULT  Goal: Patient will remain free of falls  Description: INTERVENTIONS:  - Educate patient/family on patient safety including physical limitations  - Instruct patient to call for assistance with activity   - Consult OT/PT to assist with strengthening/mobility   - Keep Call bell within reach  - Keep bed low and locked with side rails adjusted as appropriate  - Keep care items and personal belongings within reach  - Initiate and maintain comfort rounds  - Make Fall Risk Sign visible to staff  - Apply yellow socks and bracelet  for high fall risk patients  - Consider moving patient to room near nurses station  Outcome: Progressing  Goal: Maintain or return to baseline ADL function  Description: INTERVENTIONS:  -  Assess patient's ability to carry out ADLs; assess patient's baseline for ADL function and identify physical deficits which impact ability to perform ADLs (bathing, care of mouth/teeth, toileting, grooming, dressing, etc.)  - Assess/evaluate cause of self-care deficits   - Assess range of motion  - Assess patient's mobility; develop plan if impaired  - Assess patient's need for assistive devices and provide as appropriate  - Encourage maximum independence but intervene and supervise when necessary  - Involve family in performance of ADLs  - Assess for home care needs following discharge   - Consider OT consult to assist with ADL evaluation and planning for discharge  - Provide patient education as appropriate  Outcome: Progressing  Goal: Maintains/Returns to pre admission functional level  Description: INTERVENTIONS:  - Perform AM-PAC 6 Click Basic Mobility/ Daily Activity assessment daily.  - Set and communicate daily mobility goal to care team and patient/family/caregiver.   - Collaborate with rehabilitation services on mobility goals if consulted  - Out of bed for toileting  - Record patient progress and toleration of activity level   Outcome: Progressing     Problem: DISCHARGE PLANNING  Goal: Discharge to home or other facility with appropriate resources  Description: INTERVENTIONS:  - Identify barriers to discharge w/patient and caregiver  - Arrange for needed discharge resources and transportation as appropriate  - Identify discharge learning needs (meds, wound care, etc.)  - Arrange for interpretive services to assist at discharge as needed  - Refer to Case Management Department for coordinating discharge planning if the patient needs post-hospital services based on physician/advanced practitioner order or complex needs  related to functional status, cognitive ability, or social support system  Outcome: Progressing     Problem: Knowledge Deficit  Goal: Patient/family/caregiver demonstrates understanding of disease process, treatment plan, medications, and discharge instructions  Description: Complete learning assessment and assess knowledge base.  Interventions:  - Provide teaching at level of understanding  - Provide teaching via preferred learning methods  Outcome: Progressing

## 2024-10-22 NOTE — DISCHARGE SUMMARY
Discharge Summary - Hospitalist   Name: Krupa Baxter 74 y.o. female I MRN: [id number]  Unit/Bed#: -01 I Date of Admission: 10/21/2024   Date of Service: 10/22/2024 I Hospital Day: 0     Assessment & Plan  Chest wall pain  Patient Presentation: Patient presented with complaints of left sided chest pain. She reports that she was working outside in her yard, she developed a very, severe pain under her left breast. Pain is worse with deep inspirations. She denies shortness of breath, fever chills.  On 10/22, examination revealed tenderness to palpation.  Likely etiology: MSK vs pneumonia    Plan  Patient deemed stable for discharge to home  Prescribed Robaxin 500 mg q8h prn for muscle spasms  Recommend heating pad, lidocaine patch, Tylenol, and other conservative measures for analgesia,  Sepsis due to possible pneumonia (HCC)  SIRS criteria: Leukocytosis, tachycardia, tachypnea.  Suspected source: Pneumonia  Patient is asymptomatic, without fever, cough  CT chest - Subsegmental atelectatic changes versus scarring at the lingula and both lower lobes. Possibility of developing pneumonia, particularly at the lingula and left lower lobe is not excluded.   Patient received IV Rocephin 1g x1 dose during hospitalization    Plan  Prescribed cefuroxime 500 mg BID x 4 days to complete 5 day antibiotic course  Acute respiratory failure (HCC) (Resolved: 10/22/2024)  SpO2 decreased to 87% on room air while in the ED.  Suspect in the setting of early pneumonia?  Currently requiring 2 L NC with SpO2 97%.  Wean oxygen to maintain SpO2 > 90%.  Possible Cerebral amyloid angiopathy  (HCC)  Recent hospitalization in September 2024 due to complaints of right facial weakness, RUE incoordination, RUE/RLE weakness and expressive aphasia concerning for TIA; however, given findings below suspect possibility of amyloid spell as etiology of episode.  Given MRI brain revealing hemosiderin staining along the sulci of the left superior  frontal lobe and scattered areas of peripherally located remote blood products concerning for CAA.  Blood pressure control.  Follow up MRI as an outpatient in 6 months.  Continue outpatient follow up with Neurology.  Essential hypertension  Blood pressure is reviewed and acceptable.  Continue Amlodipine  Monitor blood pressure  Internal carotid aneurysm  Discovered during prior hospitalization in September 2024.  Continue outpatient follow up with Neurosurgery.  Hypercholesterolemia  Continue statin.  S/P cholecystectomy  S/P laparoscopic cholecystectomy on 09/26/2024.  Hypothyroidism  Continue levothyroxine.  Trigeminal neuralgia of left side of face  Maintained on Trileptal.   Lung nodules  Noted on CT chest  Follow up on CT chest as an outpatient in 3 months.      Medical Problems       Resolved Problems  Date Reviewed: 10/17/2024            Resolved    Acute respiratory failure (HCC) 10/22/2024     Resolved by  Ashleigh Hunt MD        Discharging Physician / Practitioner: Ashleigh Hunt MD  PCP: ELAYNE Dominguez  Admission Date:   Admission Orders (From admission, onward)       Ordered        10/22/24 0041  Place in Observation  Once                          Discharge Date: 10/22/24    Consultations During Hospital Stay:  None    Procedures Performed:   None    Significant Findings / Test Results:   XR chest 1 view portable    Result Date: 10/22/2024  Impression: No acute cardiopulmonary disease. Workstation performed: [id number]     CT abdomen pelvis wo contrast    Result Date: 10/22/2024  Impression: No acute findings in the abdomen or pelvis within the limits of unenhanced technique. Subsegmental atelectatic changes versus scarring at the lingula and both lower lobes. Possibility of developing pneumonia, particularly at the lingula and left lower lobe is not excluded. Correlate clinically. There is a cluster of complex cysts or complex cyst with septation at the anterior left mid pole measuring up to 24  mm (coronal image 119). Recommend dedicated ultrasound for more definitive evaluation. Other findings as detailed above. Workstation performed: [id number]     CTA ED Chest PE Study    Result Date: 10/21/2024  Impression: No definite pulmonary artery embolus. Areas of groundglass opacity at the lingula and bilateral lower lobes favored to be on the basis of atelectasis, however possibility of developing pneumonia is not excluded. Correlate clinically. Region of groundglass opacities with associated micronodules at the right upper lobe 304/55, with the largest nodule measuring to 5 mm (304/55). Findings are likely of an infectious/inflammatory etiology. Consider follow-up chest CT in 3 months to assess  for stability or interval change. There is a 4 mm nodule at the inferior segment of the right upper lobe (304/106). Based on current Fleischner Society 2017 Guidelines on incidental pulmonary nodule, optional follow-up CT at 12 months can be considered. Other findings as detailed above. Workstation performed: [id number]       XR chest 1 view portable    Result Date: 10/22/2024  Impression No acute cardiopulmonary disease. Workstation performed: [id number]         Incidental Findings:   As above   I reviewed the above mentioned incidental findings with the patient and/or family and they expressed understanding.    Test Results Pending at Discharge (will require follow up):   None     Outpatient Tests Requested:  None    Complications:  None    Reason for Admission: chest wall pain    Hospital Course:   Krupa Baxter is a 74 y.o. female patient who originally presented to the hospital on 10/21/2024 due to left chest wall pain under her left breast onset yesterday while working in the yard. The pain was worse on deep inspiration. She denied any fever, chills, cough, urinary symptoms, abdominal pain, or diarrhea. Patient met SIRS criteria in the ED with leukocytosis, tachycardia, tachypnea. EKG and troponin was  unremarkable.Imaging noted possible developing pneumonia and some incidental pulmonary nodules. IV Rocephin was initiated. On examination, patient's chest wall pain was reproducible. She was treated with conservative measures. Patient felt well enough for discharge on 10/22 with prescription of Robaxin to be used as needed for muscle spasms and a 4 day course of cefuroxime. Patient was recommended to follow-up with PCP within 1-2 weeks of discharge.    Please see above list of diagnoses and related plan for additional information.     Condition at Discharge: stable    Discharge Day Visit / Exam:   Subjective:  No acute overnight events. Patient is seen and examined at bedside.  She continues to report left chest wall pain that is especially worsened with some movement of her body and with deep inspiration.  She denies shortness of breath, but she states that she hesitates to take a full/normal breath. No other complaints at this time.  Vitals: Blood Pressure: 110/57 (10/22/24 1521)  Pulse: 81 (10/22/24 1521)  Temperature: 98.4 °F (36.9 °C) (10/22/24 1521)  Temp Source: Oral (10/22/24 1521)  Respirations: 20 (10/22/24 1521)  SpO2: 94 % (10/22/24 1521)  Physical Exam  Vitals and nursing note reviewed.   Constitutional:       General: She is not in acute distress.     Appearance: She is well-developed.   HENT:      Head: Normocephalic and atraumatic.   Eyes:      Conjunctiva/sclera: Conjunctivae normal.   Cardiovascular:      Rate and Rhythm: Normal rate and regular rhythm.      Heart sounds: No murmur heard.  Pulmonary:      Effort: Pulmonary effort is normal. No respiratory distress.      Breath sounds: Normal breath sounds.      Comments: Reduced respiratory effort noted on inspiration (self-splinting)  Chest:      Chest wall: Tenderness present.       Abdominal:      Palpations: Abdomen is soft.      Tenderness: There is no abdominal tenderness.   Musculoskeletal:         General: No swelling.      Cervical back:  Neck supple.   Skin:     General: Skin is warm and dry.      Capillary Refill: Capillary refill takes less than 2 seconds.   Neurological:      Mental Status: She is alert.   Psychiatric:         Mood and Affect: Mood normal.          Discussion with Family: Patient declined call to .     Discharge instructions/Information to patient and family:   See after visit summary for information provided to patient and family.      Provisions for Follow-Up Care:  See after visit summary for information related to follow-up care and any pertinent home health orders.      Mobility at time of Discharge:   Basic Mobility Inpatient Raw Score: 24  JH-HLM Goal: 8: Walk 250 feet or more  JH-HLM Achieved: 7: Walk 25 feet or more  HLM Goal NOT achieved. Continue to encourage mobility in post discharge setting.     Disposition:   Home    Planned Readmission: None    Discharge Medications:  See after visit summary for reconciled discharge medications provided to patient and/or family.      Administrative Statements   Discharge Statement:  I have spent a total time of 30 minutes in caring for this patient on the day of the visit/encounter. .    **Please Note: This note may have been constructed using a voice recognition system**

## 2024-10-22 NOTE — CASE MANAGEMENT
Case Management Assessment & Discharge Planning Note    Patient name Krupa Baxter  Location /-01 MRN [id number]  : 1950 Date 10/22/2024       Current Admission Date: 10/21/2024  Current Admission Diagnosis:Chest pain   Patient Active Problem List    Diagnosis Date Noted Date Diagnosed    S/P cholecystectomy 10/22/2024     Sepsis due to possible pneumonia (HCC) 10/22/2024     Chest pain 10/22/2024     Acute respiratory failure (HCC) 10/22/2024     Lung nodules 10/22/2024     Internal carotid aneurysm 10/21/2024     Possible Cerebral amyloid angiopathy  (HCC) 10/21/2024     Biliary dyskinesia 2024     History of TIA (transient ischemic attack) 2024     Dysfunctional gallbladder 2024     Forgetfulness 2022     Chronic RUQ pain 2021     Diarrhea 2021     Trigeminal neuralgia of left side of face 2021     Benign paroxysmal positional vertigo 2020     Overweight (BMI 25.0-29.9) 2019     Essential hypertension 2019     Primary osteoarthritis involving multiple joints 2019     Vitamin D deficiency 2019     Prediabetes 2019     Left acoustic neuroma (HCC) 2019     Other specified disorders of bladder 2014     Incomplete uterovaginal prolapse 2013     Hypercholesterolemia 2013     Hypothyroidism 2013       LOS (days): 0  Geometric Mean LOS (GMLOS) (days):   Days to GMLOS:     OBJECTIVE:              Current admission status: Observation       Preferred Pharmacy:   [id number] - [place], [place] - [address]  [address] [address]  Phone: [phone] Fax: [phone]    [facility] - [place] [address] [place] [address] [address] [address]  Phone: [phone] Fax: [phone]    [facility] - [place], [place] - [address]  [address]  [address]  [facility]  [address]  Phone: [phone] Fax: [phone]    Primary Care Provider: ELAYNE Dominguez    Primary Insurance: MEDICARE  Secondary Insurance: [employer]    ASSESSMENT:  Active Health Care Proxies    There are no active Health Care Proxies on file.                 Readmission Root Cause  30 Day Readmission: No    Patient Information  Admitted from:: Home  Mental Status: Alert  During Assessment patient was accompanied by: Not accompanied during assessment  Assessment information provided by:: Patient  Primary Caregiver: Self  Support Systems: Self, Spouse/significant other  County of Residence: [place]  What [facility] do you live in?: [place]  Home entry access options. Select all that apply.: Stairs  Number of steps to enter home.: 3  Type of Current Residence: 2 story home  Upon entering residence, is there a bedroom on the main floor (no further steps)?: No  A bedroom is located on the following floor levels of residence (select all that apply):: 2nd Floor  Upon entering residence, is there a bathroom on the main floor (no further steps)?: Yes (Half bath)  Number of steps to 2nd floor from main floor: One Flight  Living Arrangements: Lives w/ Spouse/significant other  Is patient a ?: No    Activities of Daily Living Prior to Admission  Functional Status: Independent  Completes ADLs independently?: Yes  Ambulates independently?: Yes  Does patient use assisted devices?: No  Does patient currently own DME?: Yes  What DME does the patient currently own?: Straight Cane, Rollator  Does patient have a history of Outpatient Therapy (PT/OT)?: Yes  Does the patient have a history of Short-Term Rehab?: No  Does patient have a history of HHC?: No  Does patient currently have HHC?: No         Patient Information Continued  Income Source: Pension/half-way  Does patient have prescription coverage?: Yes  Does patient receive dialysis treatments?: No  Does patient have a history of substance  abuse?: No  Does patient have a history of Mental Health Diagnosis?: No         Means of Transportation  Means of Transport to Appts:: Drives Self          DISCHARGE DETAILS:    Discharge planning discussed with:: Patient  Freedom of Choice: Yes  Comments - Freedom of Choice: Return home  CM contacted family/caregiver?: No- see comments (Pt A&O)  Were Treatment Team discharge recommendations reviewed with patient/caregiver?: Yes  Did patient/caregiver verbalize understanding of patient care needs?: Yes  Were patient/caregiver advised of the risks associated with not following Treatment Team discharge recommendations?: Yes    Contacts  Patient Contacts: Patient  Contact Method: In Person  Reason/Outcome: Continuity of Care, Discharge Planning    Requested Home Health Care         Is the patient interested in HHC at discharge?: No    DME Referral Provided  Referral made for DME?: No    Other Referral/Resources/Interventions Provided:  Interventions: Other (Specify)  Referral Comments: CM met with pt at bedside, introduced self and role with discharge planning. Pt reported she lives with her spouse in a 2 story home with 3 CAIO. Pt reported having a half bath on main level. Pt reported being fully IPTA without AD. Pt has a cane and rollator. Pt reported driving. No further CM needs noted at this time.    Would you like to participate in our [facility] service program?  : No - Declined    Treatment Team Recommendation: Home  Discharge Destination Plan:: Home  Transport at Discharge : Family

## 2024-10-22 NOTE — ASSESSMENT & PLAN NOTE
Recent hospitalization in September 2024 due to complaints of right facial weakness, RUE incoordination, RUE/RLE weakness and expressive aphasia concerning for TIA; however, given findings below suspect possibility of amyloid spell as etiology of episode.  Given MRI brain revealing hemosiderin staining along the sulci of the left superior frontal lobe and scattered areas of peripherally located remote blood products concerning for CAA.  Blood pressure control.  Follow up MRI as an outpatient in 6 months.  Continue outpatient follow up with Neurology.

## 2024-10-22 NOTE — ASSESSMENT & PLAN NOTE
"SIRS criteria: Leukocytosis, tachycardia, tachypnea.  Suspected source: Pneumonia  Lactic acid: 1.0  End organ damage: None.  CT chest PE study: No definite pulmonary artery embolus. Areas of groundglass opacity at the lingula and bilateral lower lobes favored to be on the basis of atelectasis, however possibility of developing pneumonia is not excluded. Region of groundglass opacities with associated micronodules at the right upper lobe 304/55, with the largest nodule measuring to 5 mm (304/55). Findings are likely of an infectious/inflammatory etiology.\"  Treating as CAP  IV antibiotics: IV Rocephin.  Respiratory protocol, nebs PRN.  Obtain sputum culture and Gram stain, strep and Legionella antigens.  Initial procalcitonin <0.05 and trend; if negative x 2 consider discontinuation of antibiotics.  Follow up on culture results.  Monitor vital signs, laboratory studies.  Monitor respiratory status.  "

## 2024-10-22 NOTE — ASSESSMENT & PLAN NOTE
SIRS criteria: Leukocytosis, tachycardia, tachypnea.  Suspected source: Pneumonia  Patient is asymptomatic, without fever, cough  CT chest - Subsegmental atelectatic changes versus scarring at the lingula and both lower lobes. Possibility of developing pneumonia, particularly at the lingula and left lower lobe is not excluded.   Patient received IV Rocephin 1g x1 dose during hospitalization    Plan  Prescribed cefuroxime 500 mg BID x 4 days to complete 5 day antibiotic course

## 2024-10-22 NOTE — ASSESSMENT & PLAN NOTE
SpO2 decreased to 87% on room air while in the ED.  Suspect in the setting of early pneumonia?  Currently requiring 2 L NC with SpO2 97%.  Wean oxygen to maintain SpO2 > 90%.  Respiratory protocol.

## 2024-10-22 NOTE — ASSESSMENT & PLAN NOTE
Discovered during prior hospitalization in September 2024.  Continue outpatient follow up with Neurosurgery.

## 2024-10-22 NOTE — H&P
"H&P - Hospitalist   Name: Krupa Baxter 74 y.o. female I MRN: [id number]  Unit/Bed#: ED-43 I Date of Admission: 10/21/2024   Date of Service: 10/22/2024 I Hospital Day: 0     Assessment & Plan  Chest pain  Patient Presentation: Patient presented with complaints of left sided chest pain. She reports that she was working outside in her yard yesterday afternoon. While working outside, she developed a very, severe pain under her left breast. Pain is worse with deep inspirations. She denies shortness of breath, fever chills,   Likely etiology: Pneumonia versus MSK  THERESA: 2  Initial Troponin: 5 --> 5 --> 4  Trend x3 or to peak.  Initial EKG: ST, heart rate 106, no ischemic changes noted.  Monitor on telemetry.  Received IV Dilaudid x 2 in the ED.  ATC Tylenol, Lidocaine patch and IV Toradol prn.  See further detailed plan below under sepsis.  Sepsis due to possible pneumonia (HCC)  SIRS criteria: Leukocytosis, tachycardia, tachypnea.  Suspected source: Pneumonia  Lactic acid: 1.0  End organ damage: None.  CT chest PE study: No definite pulmonary artery embolus. Areas of groundglass opacity at the lingula and bilateral lower lobes favored to be on the basis of atelectasis, however possibility of developing pneumonia is not excluded. Region of groundglass opacities with associated micronodules at the right upper lobe 304/55, with the largest nodule measuring to 5 mm (304/55). Findings are likely of an infectious/inflammatory etiology.\"  Treating as CAP  IV antibiotics: IV Rocephin.  Respiratory protocol, nebs PRN.  Obtain sputum culture and Gram stain, strep and Legionella antigens.  Initial procalcitonin <0.05 and trend; if negative x 2 consider discontinuation of antibiotics.  Follow up on culture results.  Monitor vital signs, laboratory studies.  Monitor respiratory status.  Acute respiratory failure (HCC)  SpO2 decreased to 87% on room air while in the ED.  Suspect in the setting of early pneumonia?  Currently " requiring 2 L NC with SpO2 97%.  Wean oxygen to maintain SpO2 > 90%.  Respiratory protocol.  Possible Cerebral amyloid angiopathy  (HCC)  Recent hospitalization in September 2024 due to complaints of right facial weakness, RUE incoordination, RUE/RLE weakness and expressive aphasia concerning for TIA; however, given findings below suspect possibility of amyloid spell as etiology of episode.  Given MRI brain revealing hemosiderin staining along the sulci of the left superior frontal lobe and scattered areas of peripherally located remote blood products concerning for CAA.  Blood pressure control.  Follow up MRI as an outpatient in 6 months.  Continue outpatient follow up with Neurology.  Essential hypertension  Blood pressure is reviewed and acceptable.  Last recorded pressure: 136/66  Continue Amlodipine.  Monitor blood pressure.  Internal carotid aneurysm  Discovered during prior hospitalization in September 2024.  Continue outpatient follow up with Neurosurgery.  Hypercholesterolemia  Continue statin.  S/P cholecystectomy  S/P laparoscopic cholecystectomy on 09/26/2024.  Hypothyroidism  Continue levothyroxine.  Trigeminal neuralgia of left side of face  Maintained on Trileptal.   Lung nodules  Noted on CT.  Follow up on CT chest as an outpatient in 3 months.       VTE Pharmacologic Prophylaxis: VTE Score: 7 High Risk (Score >/= 5) - Pharmacological DVT Prophylaxis Ordered: heparin. Sequential Compression Devices Ordered.  Code Status: Level 1 - Full Code per patient  Discussion with family: Updated  () at bedside.    Anticipated Length of Stay: Patient will be admitted on an observation basis with an anticipated length of stay of less than 2 midnights secondary to chest pain, early pneumonia, acute respiratory failure.    History of Present Illness   Chief Complaint: Left sided chest pain    Krupa Baxter is a 74 y.o. female with a PMH of 70 who presents with complaints of left sided chest  pain. She reports that she was working outside in her yard yesterday afternoon. While working outside, she developed a very, severe pain under her left breast. Pain is worse with deep inspirations. She denies shortness of breath, fever or chills. Of note, patient had recent hospitalization in September 2024 due to complaints of right facial weakness, RUE incoordination, RUE/RLE weakness and expressive aphasia concerning for TIA; however, given findings below suspect possibility of amyloid spell as etiology of episode. Additionally, patient is status post lap cholecystectomy on 09/26/2024.     Upon ED evaluation patient met two SIRS criteria. Patient became hypoxic requiring supplemental oxygen. Patient was ruled out for a PE. CTA chest imaging was suspicious for early pneumonia. Patient will be admitted for sepsis workup including IV antibiotics.     Review of Systems   Constitutional:  Negative for chills and fever.   Respiratory:  Negative for cough and shortness of breath.    Cardiovascular:  Positive for chest pain.   Gastrointestinal:  Negative for nausea and vomiting.   Musculoskeletal:  Positive for arthralgias.   Neurological:  Negative for dizziness.   All other systems reviewed and are negative.      Historical Information   Past Medical History:   Diagnosis Date    Acoustic neuroma (HCC)     left ear, decreased hearing    Arthritis     right knee    Disease of thyroid gland     History of radiation therapy 2007    Left acoustic neuroma    Hyperlipidemia     TIA (transient ischemic attack) 9/3/2024    Lasted about 2 hours    Trigeminal neuralgia of left side of face     Urethral hypermobility     Uterine prolapse      Past Surgical History:   Procedure Laterality Date    COLONOSCOPY      NC CMBND ANTERPOST COLPORRAPHY W/CYSTO N/A 1/8/2019    Procedure: A&P COLPORRHAPHY;  Surgeon: Baron Coyle MD;  Location[facility];  Service: UroGynecology           NC COLPOPEXY VAGINAL EXTRAPERITONEAL APPROACH N/A  1/8/2019    Procedure: ANTERIOR VE COLPOPEXY;  Surgeon: Baron Coyle MD;  Location[facility];  Service: UroGynecology           NC CYSTOURETHROSCOPY N/A 1/8/2019    Procedure: CYSTOSCOPY;  Surgeon: Baron Coyle MD;  [facility];  Service: UroGynecology           NC LAPAROSCOPY SURG CHOLECYSTECTOMY N/A 9/26/2024    Procedure: CHOLECYSTECTOMY LAPAROSCOPIC;  Surgeon: Hernando Wright DO;  [facility];  Service: General    NC SLING OPERATION STRESS INCONTINENCE N/A 1/8/2019    Procedure: PUBOVAGINAL SLING;  Surgeon: Baron Coyle MD;  Location[facility];  Service: UroGynecology           TUBAL LIGATION      WISDOM TOOTH EXTRACTION       Social History     Tobacco Use    Smoking status: Never    Smokeless tobacco: Never   Vaping Use    Vaping status: Never Used   Substance and Sexual Activity    Alcohol use: Yes     Comment: Used very seldom    Drug use: No    Sexual activity: Not Currently     Partners: Male     Birth control/protection: Female Sterilization     E-Cigarette/Vaping    E-Cigarette Use Never User      E-Cigarette/Vaping Substances    Nicotine No     THC No     CBD No     Flavoring No     Other No     Unknown No      Family History   Problem Relation Age of Onset    Arthritis Family     Diabetes Family     Hypertension Family     Osteoporosis Family     Kidney disease Family     Thyroid disease Family     Hypertension Mother     Diabetes Father     Asthma Father     Lung disease Father     Thyroid disease Sister     Thyroid disease Daughter     Mental illness Neg Hx     Substance Abuse Neg Hx     Alcohol abuse Neg Hx     Depression Neg Hx      Social History:  Marital Status: /Civil Union   Occupation: Unknown  Patient Pre-hospital Living Situation: Home  Patient Pre-hospital Level of Mobility: walks  Patient Pre-hospital Diet Restrictions: None    Meds/Allergies   I have reviewed home medications with patient personally.  Prior to Admission medications     Medication Sig Start Date End Date Taking? Authorizing Provider   amLODIPine (NORVASC) 5 mg tablet Take 1 tablet (5 mg total) by mouth daily 10/12/24  Yes ELAYNE Dominguez   levothyroxine 88 mcg tablet TAKE 1 TABLET BY MOUTH DAILY 8/24/24  Yes ELAYNE Dominguez   OXcarbazepine (TRILEPTAL) 150 mg tablet TAKE 1 TABLET BY MOUTH TWICE A DAY 8/6/24  Yes ELAYNE Dominguez   acetaminophen (TYLENOL) 500 mg tablet Take 1,000 mg by mouth every 6 (six) hours as needed for mild pain  Patient not taking: Reported on 10/17/2024    Historical Provider, MD   aspirin 81 mg chewable tablet Chew 1 tablet (81 mg total) daily 9/5/24 10/17/24  Hailey Otto MD   atorvastatin (LIPITOR) 40 mg tablet Take 1 tablet (40 mg total) by mouth daily 9/6/24 10/17/24  Hailey Otto MD   trolamine salicylate (ASPERCREME) 10 % cream Apply topically as needed for muscle/joint pain  Patient not taking: Reported on 10/17/2024    Historical Provider, MD     Allergies   Allergen Reactions    Erythromycin Rash    Carbamazepine Rash       Objective :  Temp:  [98.2 °F (36.8 °C)] 98.2 °F (36.8 °C)  HR:  [] 95  BP: (136-180)/(65-88) 136/66  Resp:  [18-24] 18  SpO2:  [87 %-99 %] 97 %  O2 Device: Nasal cannula  Nasal Cannula O2 Flow Rate (L/min):  [2 L/min] 2 L/min    Physical Exam  Vitals and nursing note reviewed.   Constitutional:       General: She is not in acute distress.     Appearance: She is not ill-appearing or diaphoretic.   HENT:      Head: Normocephalic.      Nose: Nose normal.      Mouth/Throat:      Pharynx: Oropharynx is clear.   Eyes:      General: No scleral icterus.     Conjunctiva/sclera: Conjunctivae normal.   Cardiovascular:      Rate and Rhythm: Normal rate and regular rhythm.      Pulses: Normal pulses.      Heart sounds: Normal heart sounds.   Pulmonary:      Effort: Pulmonary effort is normal. No tachypnea, accessory muscle usage or respiratory distress.      Breath sounds: Decreased breath sounds present. No  wheezing, rhonchi or rales.   Abdominal:      General: Bowel sounds are normal. There is no distension.      Palpations: Abdomen is soft.      Tenderness: There is no abdominal tenderness.   Musculoskeletal:         General: Normal range of motion.      Cervical back: Normal range of motion.   Skin:     General: Skin is warm and dry.      Capillary Refill: Capillary refill takes 2 to 3 seconds.   Neurological:      Mental Status: She is alert and oriented to person, place, and time.          Lines/Drains:            Lab Results: I have reviewed the following results:  Results from last 7 days   Lab Units 10/21/24  1847   WBC Thousand/uL 13.44*   HEMOGLOBIN g/dL 12.8   HEMATOCRIT % 39.7   PLATELETS Thousands/uL 292   SEGS PCT % 61   LYMPHO PCT % 28   MONO PCT % 9   EOS PCT % 1     Results from last 7 days   Lab Units 10/21/24  1847   SODIUM mmol/L 141   POTASSIUM mmol/L 3.7   CHLORIDE mmol/L 103   CO2 mmol/L 30   BUN mg/dL 16   CREATININE mg/dL 0.91   ANION GAP mmol/L 8   CALCIUM mg/dL 9.3   ALBUMIN g/dL 4.2   TOTAL BILIRUBIN mg/dL 0.41   ALK PHOS U/L 70   ALT U/L 16   AST U/L 18   GLUCOSE RANDOM mg/dL 102     Results from last 7 days   Lab Units 10/21/24  1847   INR  0.96         Lab Results   Component Value Date    HGBA1C 6.1 (H) 09/03/2024    HGBA1C 5.8 (H) 03/15/2024    HGBA1C 5.7 (H) 09/08/2023     Results from last 7 days   Lab Units 10/22/24  0056 10/21/24  1847   LACTIC ACID mmol/L 1.0  --    PROCALCITONIN ng/ml  --  <0.05       Imaging Results Review: I reviewed radiology reports from this admission including: CT chest and CT abdomen/pelvis.  Other Study Results Review: EKG was reviewed.     Administrative Statements   I have spent a total time of 70 minutes in caring for this patient on the day of the visit/encounter including Diagnostic results, Instructions for management, Patient and family education, Importance of tx compliance, Risk factor reductions, Impressions, Counseling / Coordination of care,  Documenting in the medical record, Reviewing / ordering tests, medicine, procedures  , Obtaining or reviewing history  , and Communicating with other healthcare professionals .    ** Please Note: This note has been constructed using a voice recognition system. **

## 2024-10-22 NOTE — INCIDENTAL FINDINGS
The following findings require follow up:  Radiographic finding   Finding: Region of groundglass opacities with associated micronodules at the right upper lobe 304/55, with the largest nodule measuring to 5 mm. There is a 4 mm nodule at the inferior segment of the right upper lobe.   Follow up required: CT chest   Follow up should be done within 3  month(s)    Finding: There is a cluster of complex cysts or complex cyst with septation at the anterior left mid pole of the kidney measuring up to 24 mm    Follow up required: Ultrasound   Follow up should be done within 3 month(s)    Please notify the following clinician to assist with the follow up:   Deya Hodge (PCP)    Incidental finding results were discussed with the Patient by Ashleigh Hunt MD on 10/22/24.   They expressed understanding and all questions answered.

## 2024-10-22 NOTE — NURSING NOTE
Reviewed discharge paperwork with patient. Patient took all personal items from room. Pt in good spirits. Pt ambulated with steady gait to Hospital exit with spouse at side.

## 2024-10-22 NOTE — DISCHARGE INSTR - AVS FIRST PAGE
Dear Krupa Baxter,     It was our pleasure to care for you here at [facility].  It is our hope that we were always able to exceed the expected standards for your care during your stay.  You were hospitalized due to chest wall pain which is most likely musculoskeletal.  You were cared for on the 1st floor by Ashleigh Hunt MD under the service of Olamide Barth MD with the [facility] who covers for your primary care physician (PCP), ELAYNE Dominguez, while you were hospitalized.  If you have any questions or concerns related to this hospitalization, you may contact us at .  For follow up as well as any medication refills, we recommend that you follow up with your primary care physician.  A registered nurse will reach out to you by phone within a few days after your discharge to answer any additional questions that you may have after going home.  However, at this time we provide for you here, the most important instructions / recommendations at discharge:     Notable Medication Adjustments -   Beginning tonight (10/22/2024 PM), start taking cefuroxime 500 mg tablet every 12 hours for 4 days.  Take methocarbamol 500 mg tablet by mouth every 6 hours AS NEEDED for muscle spasms  Take all other medications as prescribed  Testing Required after Discharge -   None  Important follow up information -   Please follow-up with your PCP within 1 to 2 weeks of discharge  Other Instructions -   If you experience any new or worsening symptoms, please contact your PCP and/or return to the nearest emergency department immediately  Please review this entire after visit summary as additional general instructions including medication list, appointments, activity, diet, any pertinent wound care, and other additional recommendations from your care team that may be provided for you.      Sincerely,     Ashleigh Hunt MD

## 2024-10-22 NOTE — ASSESSMENT & PLAN NOTE
Patient Presentation: Patient presented with complaints of left sided chest pain. She reports that she was working outside in her yard yesterday afternoon. While working outside, she developed a very, severe pain under her left breast. Pain is worse with deep inspirations. She denies shortness of breath, fever chills,   Likely etiology: Pneumonia versus MSK  THERESA: 2  Initial Troponin: 5 --> 5 --> 4  Trend x3 or to peak.  Initial EKG: ST, heart rate 106, no ischemic changes noted.  Monitor on telemetry.  Received IV Dilaudid x 2 in the ED.  ATC Tylenol, Lidocaine patch and IV Toradol prn.  See further detailed plan below under sepsis.

## 2024-10-22 NOTE — ASSESSMENT & PLAN NOTE
Blood pressure is reviewed and acceptable.  Last recorded pressure: 136/66  Continue Amlodipine.  Monitor blood pressure.

## 2024-10-22 NOTE — ASSESSMENT & PLAN NOTE
SpO2 decreased to 87% on room air while in the ED.  Suspect in the setting of early pneumonia?  Currently requiring 2 L NC with SpO2 97%.  Wean oxygen to maintain SpO2 > 90%.

## 2024-10-22 NOTE — ASSESSMENT & PLAN NOTE
Patient Presentation: Patient presented with complaints of left sided chest pain. She reports that she was working outside in her yard, she developed a very, severe pain under her left breast. Pain is worse with deep inspirations. She denies shortness of breath, fever chills.  On 10/22, examination revealed tenderness to palpation.  Likely etiology: MSK vs pneumonia    Plan  Patient deemed stable for discharge to home  Prescribed Robaxin 500 mg q8h prn for muscle spasms  Recommend heating pad, lidocaine patch, Tylenol, and other conservative measures for analgesia,

## 2024-10-23 ENCOUNTER — TRANSITIONAL CARE MANAGEMENT (OUTPATIENT)
Dept: INTERNAL MEDICINE CLINIC | Facility: CLINIC | Age: 74
End: 2024-10-23

## 2024-10-25 ENCOUNTER — RA CDI HCC (OUTPATIENT)
Dept: OTHER | Facility: HOSPITAL | Age: 74
End: 2024-10-25

## 2024-10-25 ENCOUNTER — TELEPHONE (OUTPATIENT)
Dept: INTERNAL MEDICINE CLINIC | Facility: CLINIC | Age: 74
End: 2024-10-25

## 2024-10-25 DIAGNOSIS — G89.29 CHRONIC RIGHT-SIDED THORACIC BACK PAIN: ICD-10-CM

## 2024-10-25 DIAGNOSIS — I10 ESSENTIAL HYPERTENSION: Primary | ICD-10-CM

## 2024-10-25 DIAGNOSIS — M54.6 CHRONIC RIGHT-SIDED THORACIC BACK PAIN: ICD-10-CM

## 2024-10-25 RX ORDER — TIZANIDINE 2 MG/1
2 TABLET ORAL EVERY 8 HOURS PRN
Qty: 30 TABLET | Refills: 0 | Status: SHIPPED | OUTPATIENT
Start: 2024-10-25

## 2024-10-25 NOTE — TELEPHONE ENCOUNTER
If chest tightness returns or worsens, recommend ER evaluation.    I sent in tizanidine to try instead of the methocarbamol.     Again, as previously stated, I don't think the increase in the statin is causing her muscle spasms since she's had them before even on the 20 mg.         Nya Stern CM    10/25/24  2:54 PM  Unsigned Note     Pt states the chest tightness is new.  It is feeling better since this morning.       She would like to try the tizanidine.       Her statin was increased from 20 mg to 40 mg.  Could this be causing her muscle spasms?

## 2024-10-27 LAB
BACTERIA BLD CULT: NORMAL
BACTERIA BLD CULT: NORMAL

## 2024-10-28 ENCOUNTER — CONSULT (OUTPATIENT)
Dept: NEUROSURGERY | Facility: CLINIC | Age: 74
End: 2024-10-28
Payer: MEDICARE

## 2024-10-28 VITALS
SYSTOLIC BLOOD PRESSURE: 122 MMHG | HEART RATE: 82 BPM | RESPIRATION RATE: 17 BRPM | DIASTOLIC BLOOD PRESSURE: 76 MMHG | WEIGHT: 145 LBS | BODY MASS INDEX: 25.69 KG/M2 | TEMPERATURE: 98 F | OXYGEN SATURATION: 97 % | HEIGHT: 63 IN

## 2024-10-28 DIAGNOSIS — I67.1 INTERNAL CAROTID ANEURYSM: ICD-10-CM

## 2024-10-28 PROCEDURE — 99204 OFFICE O/P NEW MOD 45 MIN: CPT | Performed by: PHYSICIAN ASSISTANT

## 2024-10-28 NOTE — PROGRESS NOTES
Ambulatory Visit  Name: Krupa Baxter      : 1950      MRN: [id number]  Encounter Provider: Addison Servin PA-C  Encounter Date: 10/28/2024   Encounter department: [facility] NEUROSURGICAL [facility]    Assessment & Plan  Internal carotid aneurysm  Patient is a 74 years old presenting with past medical history of hypertension, CAA, pneumonia with sepsis, biliary dyskinesia, hypothyroidism, BPPV, left acoustic neuroma, hypercholesterolemia, and recently TIA.  She is here today referred by neurology service for evaluation of abnormal brain CTA that was ordered for workup of stroke.  Patient reports that she developed paresthesia in the fingertips, facial palsy and some speech problem.  CTA head and neck ordered and a 0.3 cm left and 0.2 cm right ICA supraclinoid segments aneurysms.  Patient reported she is feeling better now, no paresthesia in the fingertips, facial palsy speech issues.   She denies any headache, seizures, vision issues, nausea, vomiting, weakness in the extremities, numbness or paresthesia.  Reports occasional gait issues in the setting of BPPV.  She reported her PCP increased statin dosage and also had aspirin.  She noticed some muscle ache and spasm in her trunks after  an increase in statin dose.     Images:     CTA head and neck stroke workup  demonstrates negative CT head negative for large vessel occlusion, dissection, or high-grade stenosis.  However there is 0.3 cm aneurysm left ICA supraclinoid segment projecting inferomedially and also 0.2 cm aneurysm in the right ICA supraclinoid segment projecting  inferomedially.                  Plan:  CTA head with and without contrast in 6 months  Continue statin and aspirin  Follow-up in 6 months with AP  Call with question or concern            Orders:  •  Ambulatory referral to Neurosurgery  •  BUN/Creatinine Ratio; Future  •  BUN/Creatinine Ratio  •  CTA head w wo contrast; Future      History of Present Illness   See discussion  "above  HPI  Review of Systems   HENT:  Positive for tinnitus (left ear, not new). Negative for trouble swallowing.    Eyes:  Negative for pain and visual disturbance.   Genitourinary:  Negative for enuresis.   Musculoskeletal:  Positive for gait problem (off balance when ambulating).   Neurological:  Negative for dizziness, speech difficulty, weakness, light-headedness, numbness and headaches.   Hematological:  Bruises/bleeds easily (asa81).   Psychiatric/Behavioral:  Negative for sleep disturbance.    All other systems reviewed and are negative.    I have personally reviewed the MA's review of systems and made changes as necessary.      Objective     Resp 17   Ht 5' 2.5\" (1.588 m)   Wt 65.8 kg (145 lb)   BMI 26.10 kg/m²     Physical Exam  Constitutional:       Appearance: Normal appearance.   HENT:      Head: Normocephalic and atraumatic.   Eyes:      Extraocular Movements: Extraocular movements intact.      Pupils: Pupils are equal, round, and reactive to light.   Cardiovascular:      Rate and Rhythm: Normal rate and regular rhythm.   Musculoskeletal:         General: Normal range of motion.      Cervical back: Normal range of motion.   Neurological:      General: No focal deficit present.      Mental Status: She is alert and oriented to person, place, and time.      GCS: GCS eye subscore is 4. GCS verbal subscore is 5. GCS motor subscore is 6.      Cranial Nerves: Cranial nerves 2-12 are intact.      Sensory: Sensation is intact.      Motor: Motor function is intact.      Coordination: Finger-Nose-Finger Test normal.      Deep Tendon Reflexes: Reflexes are normal and symmetric.      Reflex Scores:       Tricep reflexes are 2+ on the right side and 2+ on the left side.       Bicep reflexes are 2+ on the right side and 2+ on the left side.       Brachioradialis reflexes are 2+ on the right side and 2+ on the left side.       Patellar reflexes are 2+ on the right side and 2+ on the left side.       Achilles " reflexes are 2+ on the right side and 2+ on the left side.  Psychiatric:         Speech: Speech normal.   Neurologic Exam     Mental Status   Oriented to person, place, and time.   Speech: speech is normal   Level of consciousness: alert    Cranial Nerves   Cranial nerves II through XII intact.     CN III, IV, VI   Pupils are equal, round, and reactive to light.    Motor Exam   Muscle bulk: normal  Overall muscle tone: normal  Right arm tone: normal  Left arm tone: normal  Right arm pronator drift: absent  Left arm pronator drift: absent  Right leg tone: normal  Left leg tone: normal    Sensory Exam   Light touch normal.     Gait, Coordination, and Reflexes     Coordination   Finger to nose coordination: normal    Reflexes   Right brachioradialis: 2+  Left brachioradialis: 2+  Right biceps: 2+  Left biceps: 2+  Right triceps: 2+  Left triceps: 2+  Right patellar: 2+  Left patellar: 2+  Right achilles: 2+  Left achilles: 2+  Right : 2+  Left : 2+  Right Bustos: absent  Left Bustos: absent  Right ankle clonus: absent  Left ankle clonus: absent    I personally reviewed the following image studies in PACS and associated radiology reports: CT A. My interpretation of the radiology images/reports is: Findings as described in the assessment section above.    Administrative Statements   I have spent a total time of 30 minutes in caring for this patient on the day of the visit/encounter including Diagnostic results, Prognosis, Risks and benefits of tx options, Instructions for management, Patient and family education, Importance of tx compliance, Risk factor reductions, Impressions, Counseling / Coordination of care, Documenting in the medical record, Reviewing / ordering tests, medicine, procedures  , and Obtaining or reviewing history  .

## 2024-10-28 NOTE — ASSESSMENT & PLAN NOTE
Patient is a 74 years old presenting with past medical history of hypertension, CAA, pneumonia with sepsis, biliary dyskinesia, hypothyroidism, BPPV, left acoustic neuroma, hypercholesterolemia, and recently TIA.  She is here today referred by neurology service for evaluation of abnormal brain CTA that was ordered for workup of stroke.  Patient reports that she developed paresthesia in the fingertips, facial palsy and some speech problem.  CTA head and neck ordered and a 0.3 cm left and 0.2 cm right ICA supraclinoid segments aneurysms.  Patient reported she is feeling better now, no paresthesia in the fingertips, facial palsy speech issues.   She denies any headache, seizures, vision issues, nausea, vomiting, weakness in the extremities, numbness or paresthesia.  Reports occasional gait issues in the setting of BPPV.  She reported her PCP increased statin dosage and also had aspirin.  She noticed some muscle ache and spasm in her trunks after  an increase in statin dose.     Images:     CTA head and neck stroke workup  demonstrates negative CT head negative for large vessel occlusion, dissection, or high-grade stenosis.  However there is 0.3 cm aneurysm left ICA supraclinoid segment projecting inferomedially and also 0.2 cm aneurysm in the right ICA supraclinoid segment projecting  inferomedially.                  Plan:  CTA head with and without contrast in 6 months  Continue statin and aspirin  Follow-up in 6 months with AP  Call with question or concern            Orders:    Ambulatory referral to Neurosurgery    BUN/Creatinine Ratio; Future    BUN/Creatinine Ratio    CTA head w wo contrast; Future

## 2024-10-28 NOTE — LETTER
2024     ELAYNE Lopez  [address] [place] [address]    Patient: Krupa Baxter   YOB: 1950   Date of Visit: 10/28/2024       Dear Dr. Nogueira:    Thank you for referring Krupa Baxter to me for evaluation. Below are my notes for this consultation.    If you have questions, please do not hesitate to call me. I look forward to following your patient along with you.         Sincerely,        Addison Servin PA-C        CC: No Recipients    Addison Servin PA-C  10/28/2024  5:24 PM  Sign when Signing Visit  Ambulatory Visit  Name: Krupa Baxter      : 1950      MRN: [id number]  Encounter Provider: Addison Servin PA-C  Encounter Date: 10/28/2024   Encounter department: [facility] NEUROSURG[facility]    Assessment & Plan  Internal carotid aneurysm  Patient is a 74 years old presenting with past medical history of hypertension, CAA, pneumonia with sepsis, biliary dyskinesia, hypothyroidism, BPPV, left acoustic neuroma, hypercholesterolemia, and recently TIA.  She is here today referred by neurology service for evaluation of abnormal brain CTA that was ordered for workup of stroke.  Patient reports that she developed paresthesia in the fingertips, facial palsy and some speech problem.  CTA head and neck ordered and a 0.3 cm left and 0.2 cm right ICA supraclinoid segments aneurysms.  Patient reported she is feeling better now, no paresthesia in the fingertips, facial palsy speech issues.   She denies any headache, seizures, vision issues, nausea, vomiting, weakness in the extremities, numbness or paresthesia.  Reports occasional gait issues in the setting of BPPV.  She reported her PCP increased statin dosage and also had aspirin.  She noticed some muscle ache and spasm in her trunks after  an increase in statin dose.     Images:     CTA head and neck stroke workup  demonstrates negative CT head negative for large vessel occlusion, dissection, or high-grade  "stenosis.  However there is 0.3 cm aneurysm left ICA supraclinoid segment projecting inferomedially and also 0.2 cm aneurysm in the right ICA supraclinoid segment projecting  inferomedially.                  Plan:  CTA head with and without contrast in 6 months  Continue statin and aspirin  Follow-up in 6 months with AP  Call with question or concern            Orders:  •  Ambulatory referral to Neurosurgery  •  BUN/Creatinine Ratio; Future  •  BUN/Creatinine Ratio  •  CTA head w wo contrast; Future      History of Present Illness  See discussion above  HPI  Review of Systems   HENT:  Positive for tinnitus (left ear, not new). Negative for trouble swallowing.    Eyes:  Negative for pain and visual disturbance.   Genitourinary:  Negative for enuresis.   Musculoskeletal:  Positive for gait problem (off balance when ambulating).   Neurological:  Negative for dizziness, speech difficulty, weakness, light-headedness, numbness and headaches.   Hematological:  Bruises/bleeds easily (asa81).   Psychiatric/Behavioral:  Negative for sleep disturbance.    All other systems reviewed and are negative.    I have personally reviewed the MA's review of systems and made changes as necessary.      Objective    Resp 17   Ht 5' 2.5\" (1.588 m)   Wt 65.8 kg (145 lb)   BMI 26.10 kg/m²     Physical Exam  Constitutional:       Appearance: Normal appearance.   HENT:      Head: Normocephalic and atraumatic.   Eyes:      Extraocular Movements: Extraocular movements intact.      Pupils: Pupils are equal, round, and reactive to light.   Cardiovascular:      Rate and Rhythm: Normal rate and regular rhythm.   Musculoskeletal:         General: Normal range of motion.      Cervical back: Normal range of motion.   Neurological:      General: No focal deficit present.      Mental Status: She is alert and oriented to person, place, and time.      GCS: GCS eye subscore is 4. GCS verbal subscore is 5. GCS motor subscore is 6.      Cranial Nerves: " Cranial nerves 2-12 are intact.      Sensory: Sensation is intact.      Motor: Motor function is intact.      Coordination: Finger-Nose-Finger Test normal.      Deep Tendon Reflexes: Reflexes are normal and symmetric.      Reflex Scores:       Tricep reflexes are 2+ on the right side and 2+ on the left side.       Bicep reflexes are 2+ on the right side and 2+ on the left side.       Brachioradialis reflexes are 2+ on the right side and 2+ on the left side.       Patellar reflexes are 2+ on the right side and 2+ on the left side.       Achilles reflexes are 2+ on the right side and 2+ on the left side.  Psychiatric:         Speech: Speech normal.   Neurologic Exam     Mental Status   Oriented to person, place, and time.   Speech: speech is normal   Level of consciousness: alert    Cranial Nerves   Cranial nerves II through XII intact.     CN III, IV, VI   Pupils are equal, round, and reactive to light.    Motor Exam   Muscle bulk: normal  Overall muscle tone: normal  Right arm tone: normal  Left arm tone: normal  Right arm pronator drift: absent  Left arm pronator drift: absent  Right leg tone: normal  Left leg tone: normal    Sensory Exam   Light touch normal.     Gait, Coordination, and Reflexes     Coordination   Finger to nose coordination: normal    Reflexes   Right brachioradialis: 2+  Left brachioradialis: 2+  Right biceps: 2+  Left biceps: 2+  Right triceps: 2+  Left triceps: 2+  Right patellar: 2+  Left patellar: 2+  Right achilles: 2+  Left achilles: 2+  Right : 2+  Left : 2+  Right Bustos: absent  Left Bustos: absent  Right ankle clonus: absent  Left ankle clonus: absent    I personally reviewed the following image studies in PACS and associated radiology reports: CT A. My interpretation of the radiology images/reports is: Findings as described in the assessment section above.    Administrative Statements  I have spent a total time of 30 minutes in caring for this patient on the day of the  visit/encounter including Diagnostic results, Prognosis, Risks and benefits of tx options, Instructions for management, Patient and family education, Importance of tx compliance, Risk factor reductions, Impressions, Counseling / Coordination of care, Documenting in the medical record, Reviewing / ordering tests, medicine, procedures  , and Obtaining or reviewing history  .

## 2024-10-31 ENCOUNTER — OFFICE VISIT (OUTPATIENT)
Dept: INTERNAL MEDICINE CLINIC | Facility: CLINIC | Age: 74
End: 2024-10-31
Payer: MEDICARE

## 2024-10-31 VITALS
TEMPERATURE: 97.3 F | HEIGHT: 63 IN | WEIGHT: 143 LBS | HEART RATE: 89 BPM | BODY MASS INDEX: 25.34 KG/M2 | OXYGEN SATURATION: 98 % | DIASTOLIC BLOOD PRESSURE: 60 MMHG | SYSTOLIC BLOOD PRESSURE: 120 MMHG

## 2024-10-31 DIAGNOSIS — R07.89 CHEST WALL PAIN: ICD-10-CM

## 2024-10-31 DIAGNOSIS — I10 ESSENTIAL HYPERTENSION: ICD-10-CM

## 2024-10-31 DIAGNOSIS — I68.0 CEREBRAL AMYLOID ANGIOPATHY  (HCC): ICD-10-CM

## 2024-10-31 DIAGNOSIS — J18.9 SEPSIS DUE TO PNEUMONIA (HCC): Primary | ICD-10-CM

## 2024-10-31 DIAGNOSIS — E78.00 HYPERCHOLESTEROLEMIA: ICD-10-CM

## 2024-10-31 DIAGNOSIS — R91.8 LUNG NODULES: ICD-10-CM

## 2024-10-31 DIAGNOSIS — I67.1 INTERNAL CAROTID ANEURYSM: ICD-10-CM

## 2024-10-31 DIAGNOSIS — A41.9 SEPSIS DUE TO PNEUMONIA (HCC): Primary | ICD-10-CM

## 2024-10-31 DIAGNOSIS — E85.4 CEREBRAL AMYLOID ANGIOPATHY  (HCC): ICD-10-CM

## 2024-10-31 PROCEDURE — 99495 TRANSJ CARE MGMT MOD F2F 14D: CPT | Performed by: NURSE PRACTITIONER

## 2024-10-31 RX ORDER — ATORVASTATIN CALCIUM 40 MG/1
40 TABLET, FILM COATED ORAL DAILY
Qty: 90 TABLET | Refills: 1 | Status: SHIPPED | OUTPATIENT
Start: 2024-10-31 | End: 2024-11-30

## 2024-10-31 NOTE — PROGRESS NOTES
TCM Call       Date and time call was made  10/23/2024  9:54 AM    Hospital care reviewed  Records reviewed    Patient was hospitialized at  [facility]    Date of Admission  10/21/24    Date of discharge  10/22/24    Diagnosis  chest wall pain    Disposition  Home    Were the patients medications reviewed and updated  No    Current Symptoms  Fatigue    Upper abdominal pain onset  Ongoing    Fatigue severity  Moderate          TCM Call       Post hospital issues  None    Scheduled for follow up?  Yes    Did you obtain your prescribed medications  Yes    Do you need help managing your prescriptions or medications  No    Is transportation to your appointment needed  No    I have advised the patient to call PCP with any new or worsening symptoms  Brittney Jorge    Living Arrangements  Spouse or Significiant other    Are you recieving any outpatient services  No    Are you recieving home care services  No    Are you using any community resources  No    Current waiver services  No    Have you fallen in the last 12 months  No    Interperter language line needed  No    Comments  LM on

## 2024-10-31 NOTE — ASSESSMENT & PLAN NOTE
Finished course of antibiotics. No symptoms.   Repeat CT in 3 months.     Orders:    CT chest wo contrast; Future

## 2024-10-31 NOTE — ASSESSMENT & PLAN NOTE
Continue atorvastatin 40 mg daily.   Orders:    atorvastatin (LIPITOR) 40 mg tablet; Take 1 tablet (40 mg total) by mouth daily

## 2024-10-31 NOTE — PROGRESS NOTES
Transition of Care Visit  Name: Krupa Baxter      : 1950      MRN: [id number]  Encounter Provider: ELAYNE Dominguez  Encounter Date: 10/31/2024   Encounter department: [facility] INTERNAL MEDICINE    Assessment & Plan  Sepsis due to possible pneumonia (HCC)  Finished course of antibiotics. No symptoms.   Repeat CT in 3 months.     Orders:    CT chest wo contrast; Future    Lung nodules  Repeat CT chest in 3 months.     Orders:    CT chest wo contrast; Future    Possible Cerebral amyloid angiopathy  (HCC)  MRI brain ordered in 6 months.   Sees neurology          Internal carotid aneurysm  Continue statin and asa.   Repeat CTA head in 6 months per neurosurgery.          Essential hypertension  Well controlled.  On amlodipine         Chest wall pain  Resolved.        Hypercholesterolemia  Continue atorvastatin 40 mg daily.   Orders:    atorvastatin (LIPITOR) 40 mg tablet; Take 1 tablet (40 mg total) by mouth daily         History of Present Illness     Transitional Care Management Review:   Krupa Baxter is a 74 y.o. female here for TCM follow up.     During the TCM phone call patient stated:  TCM Call       Date and time call was made  10/23/2024  9:54 AM    Hospital care reviewed  Records reviewed    Patient was hospitialized at  [facility]    Date of Admission  10/21/24    Date of discharge  10/22/24    Diagnosis  chest wall pain    Disposition  Home    Were the patients medications reviewed and updated  No    Current Symptoms  Fatigue    Upper abdominal pain onset  Ongoing    Fatigue severity  Moderate          TCM Call       Post hospital issues  None    Scheduled for follow up?  Yes    Did you obtain your prescribed medications  Yes    Do you need help managing your prescriptions or medications  No    Is transportation to your appointment needed  No    I have advised the patient to call PCP with any new or worsening symptoms  Brittney Jorge    Living Arrangements  Spouse  "or Significiant other    Are you recieving any outpatient services  No    Are you recieving home care services  No    Are you using any community resources  No    Current waiver services  No    Have you fallen in the last 12 months  No    Interperter language line needed  No    Comments  LM on VM          rKupa was hospitalized from 10/21-10/22 with left sided chest/breast pain.   She was treated with antibiotics for possible pneumonia and also started on a muscle relaxer for a possible muscle strain.  The pain has almost completely resolved.   She denies any fever, cough, or shortness of breath.     She has been getting cramps in her legs the last few days.          Review of Systems   Constitutional:  Negative for activity change, appetite change, fatigue and fever.   Respiratory:  Negative for cough and shortness of breath.    Cardiovascular:  Negative for chest pain, palpitations and leg swelling.   Gastrointestinal:  Negative for abdominal pain.   Musculoskeletal:  Negative for arthralgias.   Neurological:  Negative for dizziness, light-headedness and headaches.     Objective     /60 (BP Location: Left arm, Patient Position: Sitting, Cuff Size: Standard)   Pulse 89   Temp (!) 97.3 °F (36.3 °C) (Temporal)   Ht 5' 2.5\" (1.588 m)   Wt 64.9 kg (143 lb)   SpO2 98%   BMI 25.74 kg/m²     Physical Exam  Vitals reviewed.   Constitutional:       Appearance: Normal appearance.   HENT:      Head: Normocephalic and atraumatic.   Eyes:      Conjunctiva/sclera: Conjunctivae normal.   Cardiovascular:      Rate and Rhythm: Normal rate and regular rhythm.      Heart sounds: Normal heart sounds.   Pulmonary:      Effort: Pulmonary effort is normal.      Breath sounds: Normal breath sounds.   Musculoskeletal:      Cervical back: Neck supple.   Neurological:      Mental Status: She is alert and oriented to person, place, and time.   Psychiatric:         Mood and Affect: Mood normal.         Behavior: Behavior normal. "     Medications have been reviewed by provider in current encounter

## 2024-11-08 ENCOUNTER — NURSE TRIAGE (OUTPATIENT)
Age: 74
End: 2024-11-08

## 2024-11-08 ENCOUNTER — OFFICE VISIT (OUTPATIENT)
Dept: URGENT CARE | Age: 74
End: 2024-11-08
Payer: MEDICARE

## 2024-11-08 ENCOUNTER — PATIENT MESSAGE (OUTPATIENT)
Dept: INTERNAL MEDICINE CLINIC | Facility: CLINIC | Age: 74
End: 2024-11-08

## 2024-11-08 VITALS
RESPIRATION RATE: 16 BRPM | TEMPERATURE: 96.3 F | HEART RATE: 86 BPM | DIASTOLIC BLOOD PRESSURE: 72 MMHG | WEIGHT: 143.3 LBS | SYSTOLIC BLOOD PRESSURE: 139 MMHG | BODY MASS INDEX: 25.79 KG/M2 | OXYGEN SATURATION: 97 %

## 2024-11-08 DIAGNOSIS — T81.72XA THROMBOPHLEBITIS DUE TO PROCEDURE: Primary | ICD-10-CM

## 2024-11-08 DIAGNOSIS — I80.9 THROMBOPHLEBITIS DUE TO PROCEDURE: Primary | ICD-10-CM

## 2024-11-08 PROCEDURE — G0463 HOSPITAL OUTPT CLINIC VISIT: HCPCS | Performed by: PHYSICIAN ASSISTANT

## 2024-11-08 PROCEDURE — 99213 OFFICE O/P EST LOW 20 MIN: CPT | Performed by: PHYSICIAN ASSISTANT

## 2024-11-08 NOTE — PATIENT INSTRUCTIONS
Recommend Ibuprofen 600 mg TID x 3-5 days.   Warm compresses 2-3 times a day for the next 3-5 days.   If symptoms are not improved in 3-5 days, follow-up with PCP.   If symptoms worsen or new symptoms develop report to the emergency department immediately.

## 2024-11-08 NOTE — TELEPHONE ENCOUNTER
"Reason for Disposition  • Localized pain, redness or hard lump along vein    Answer Assessment - Initial Assessment Questions  1. ONSET: \"When did the pain start?\"      A week ago   2. LOCATION: \"Where is the pain located?\"      Right arm at the antecubital site   3. PAIN: \"How bad is the pain?\" (Scale 0-10; or none, mild, moderate, severe)      Mils pain to touch   5. CAUSE: \"What do you think is causing the arm pain?\"      Symptoms at the site where the blood was drawn 3 weeks ago.   6. OTHER SYMPTOMS: \"Do you have any other symptoms?\" (e.g., neck pain, swelling, rash, fever, numbness, weakness)      Puffiness at the site   7. PREGNANCY: \"Is there any chance you are pregnant?\" \"When was your last menstrual period?\"      N/A    Protocols used: Arm Pain-Adult-[place]    "

## 2024-11-08 NOTE — PROGRESS NOTES
[facility]        NAME: Krupa Baxter is a 74 y.o. female  : 1950    MRN: [id number]  DATE: 2024  TIME: 1:35 PM    Assessment and Plan   Thrombophlebitis due to procedure [T81.72XA, I80.9]  1. Thrombophlebitis due to procedure          Patient presents with symptoms and examination consistent with thrombophlebitis secondary to blood draw.  Recommend warm compresses and anti-inflammatories.    Patient Instructions     Patient Instructions   Recommend Ibuprofen 600 mg TID x 3-5 days.   Warm compresses 2-3 times a day for the next 3-5 days.   If symptoms are not improved in 3-5 days, follow-up with PCP.   If symptoms worsen or new symptoms develop report to the emergency department immediately.     Follow up with PCP in 3-5 days.  Proceed to  ER if symptoms worsen.    If tests have been performed at [facility], our office will contact you with results if changes need to be made to the care plan discussed with you at the visit. You can review your full results on [facility] MyChart.     Chief Complaint     Chief Complaint   Patient presents with    Arm Pain     . Denies  injury Patient has had a lot of blood work and has has tenderness at ac on right arm          History of Present Illness       74-year-old female presents with complaint of pain to her right hand cubital fossa.  Patient reports that she had blood work done on 10/28/2024 and symptoms started shortly thereafter.  She states that she noticed a slight bulge in the area is very tender to palpation there is no warmth and did not patient denies any fever or chills.  She does have a history of TIA and is concern for possible clot.    Arm Pain         Review of Systems   Review of Systems   Constitutional:  Negative for chills and fever.   Skin:  Negative for rash.         Current Medications       Current Outpatient Medications:     amLODIPine (NORVASC) 5 mg tablet, Take 1 tablet (5 mg total) by mouth daily, Disp: 30 tablet, Rfl:  0    atorvastatin (LIPITOR) 40 mg tablet, Take 1 tablet (40 mg total) by mouth daily, Disp: 90 tablet, Rfl: 1    levothyroxine 88 mcg tablet, TAKE 1 TABLET BY MOUTH DAILY, Disp: 90 tablet, Rfl: 1    OXcarbazepine (TRILEPTAL) 150 mg tablet, TAKE 1 TABLET BY MOUTH TWICE A DAY, Disp: 60 tablet, Rfl: 5    tiZANidine (ZANAFLEX) 2 mg tablet, Take 1 tablet (2 mg total) by mouth every 8 (eight) hours as needed for muscle spasms, Disp: 30 tablet, Rfl: 0    aspirin 81 mg chewable tablet, Chew 1 tablet (81 mg total) daily, Disp: 30 tablet, Rfl: 0    Current Allergies     Allergies as of 11/08/2024 - Reviewed 11/08/2024   Allergen Reaction Noted    Erythromycin Rash 09/29/2016    Carbamazepine Rash 03/14/2022            The following portions of the patient's history were reviewed and updated as appropriate: allergies, current medications, past family history, past medical history, past social history, past surgical history and problem list.     Past Medical History:   Diagnosis Date    Acoustic neuroma (HCC)     left ear, decreased hearing    Arthritis     right knee    Disease of thyroid gland     History of radiation therapy 2007    Left acoustic neuroma    Hyperlipidemia     TIA (transient ischemic attack) 9/3/2024    Lasted about 2 hours    Trigeminal neuralgia of left side of face     Urethral hypermobility     Uterine prolapse        Past Surgical History:   Procedure Laterality Date    COLONOSCOPY      ND CMBND ANTERPOST COLPORRAPHY W/CYSTO N/A 1/8/2019    Procedure: A&P COLPORRHAPHY;  Surgeon: Baron Coyle MD;  Location: [facility];  Service: UroGynecology           ND COLPOPEXY VAGINAL EXTRAPERITONEAL APPROACH N/A 1/8/2019    Procedure: ANTERIOR VE COLPOPEXY;  Surgeon: Baron Coyle MD;  Location: [facility];  Service: UroGynecology           ND CYSTOURETHROSCOPY N/A 1/8/2019    Procedure: CYSTOSCOPY;  Surgeon: Baron Coyle MD;  Location: [facility];  Service: UroGynecology           ND LAPAROSCOPY SURG  CHOLECYSTECTOMY N/A 9/26/2024    Procedure: CHOLECYSTECTOMY LAPAROSCOPIC;  Surgeon: Hernando Wright DO;  [facility];  Service: General    AR SLING OPERATION STRESS INCONTINENCE N/A 1/8/2019    Procedure: PUBOVAGINAL SLING;  Surgeon: Baron Coyle MD;  Location: [facility];  Service: UroGynecology           TUBAL LIGATION      WISDOM TOOTH EXTRACTION         Family History   Problem Relation Age of Onset    Arthritis Family     Diabetes Family     Hypertension Family     Osteoporosis Family     Kidney disease Family     Thyroid disease Family     Hypertension Mother     Diabetes Father     Asthma Father     Lung disease Father     Thyroid disease Sister     Thyroid disease Daughter     Mental illness Neg Hx     Substance Abuse Neg Hx     Alcohol abuse Neg Hx     Depression Neg Hx          Medications have been verified.        Objective   /72   Pulse 86   Temp (!) 96.3 °F (35.7 °C) (Temporal)   Resp 16   Wt 65 kg (143 lb 4.8 oz)   SpO2 97%   BMI 25.79 kg/m²   No LMP recorded. Patient is postmenopausal.       Physical Exam     Physical Exam  Vitals and nursing note reviewed.   Constitutional:       General: She is awake. She is not in acute distress.     Appearance: Normal appearance. She is well-developed and well-groomed. She is not ill-appearing, toxic-appearing or diaphoretic.   HENT:      Head: Normocephalic and atraumatic.      Right Ear: Hearing and external ear normal.      Left Ear: Hearing and external ear normal.   Eyes:      General: Lids are normal. Vision grossly intact. Gaze aligned appropriately.   Cardiovascular:      Rate and Rhythm: Normal rate.      Comments: Patient has cording and tenderness to the right antecubital fossa and right antecubital vein consistent with superficial thrombophlebitis.  Pulmonary:      Effort: Pulmonary effort is normal.      Comments: Patient is speaking in full sentences with no increased respiratory effort. No audible wheezing or  "stridor.   Musculoskeletal:      Cervical back: Normal range of motion.   Skin:     General: Skin is warm and dry.   Neurological:      Mental Status: She is alert and oriented to person, place, and time.      Coordination: Coordination is intact.      Gait: Gait is intact.   Psychiatric:         Attention and Perception: Attention and perception normal.         Mood and Affect: Mood and affect normal.         Speech: Speech normal.         Behavior: Behavior normal. Behavior is cooperative.               Note: Portions of this record may have been created with voice recognition software. Occasional wrong word or \"sound a like\" substitutions may have occurred due to the inherent limitations of voice recognition software. Please read the chart carefully and recognize, using context, where substitutions have occurred.*      "

## 2024-11-09 DIAGNOSIS — I10 ESSENTIAL HYPERTENSION: ICD-10-CM

## 2024-11-09 RX ORDER — AMLODIPINE BESYLATE 5 MG/1
5 TABLET ORAL DAILY
Qty: 90 TABLET | Refills: 1 | Status: SHIPPED | OUTPATIENT
Start: 2024-11-09

## 2024-11-21 PROBLEM — J18.9 SEPSIS DUE TO PNEUMONIA (HCC): Status: RESOLVED | Noted: 2024-10-22 | Resolved: 2024-11-21

## 2024-11-21 PROBLEM — A41.9 SEPSIS DUE TO PNEUMONIA (HCC): Status: RESOLVED | Noted: 2024-10-22 | Resolved: 2024-11-21

## 2024-12-27 ENCOUNTER — RA CDI HCC (OUTPATIENT)
Dept: OTHER | Facility: HOSPITAL | Age: 74
End: 2024-12-27

## 2025-01-03 ENCOUNTER — APPOINTMENT (OUTPATIENT)
Dept: LAB | Facility: CLINIC | Age: 75
End: 2025-01-03
Payer: MEDICARE

## 2025-01-03 ENCOUNTER — OFFICE VISIT (OUTPATIENT)
Dept: INTERNAL MEDICINE CLINIC | Facility: CLINIC | Age: 75
End: 2025-01-03
Payer: MEDICARE

## 2025-01-03 VITALS
TEMPERATURE: 96.9 F | BODY MASS INDEX: 25.34 KG/M2 | SYSTOLIC BLOOD PRESSURE: 120 MMHG | HEIGHT: 63 IN | WEIGHT: 143 LBS | DIASTOLIC BLOOD PRESSURE: 80 MMHG | OXYGEN SATURATION: 100 % | HEART RATE: 91 BPM

## 2025-01-03 DIAGNOSIS — E78.00 HYPERCHOLESTEROLEMIA: ICD-10-CM

## 2025-01-03 DIAGNOSIS — E85.4 CEREBRAL AMYLOID ANGIOPATHY  (HCC): Primary | ICD-10-CM

## 2025-01-03 DIAGNOSIS — D33.3 LEFT ACOUSTIC NEUROMA (HCC): ICD-10-CM

## 2025-01-03 DIAGNOSIS — R73.03 PREDIABETES: ICD-10-CM

## 2025-01-03 DIAGNOSIS — I68.0 CEREBRAL AMYLOID ANGIOPATHY  (HCC): Primary | ICD-10-CM

## 2025-01-03 DIAGNOSIS — I67.1 INTERNAL CAROTID ANEURYSM: ICD-10-CM

## 2025-01-03 DIAGNOSIS — R91.8 LUNG NODULES: ICD-10-CM

## 2025-01-03 DIAGNOSIS — E03.9 HYPOTHYROIDISM, UNSPECIFIED TYPE: ICD-10-CM

## 2025-01-03 DIAGNOSIS — I10 ESSENTIAL HYPERTENSION: ICD-10-CM

## 2025-01-03 LAB
ALBUMIN SERPL BCG-MCNC: 4.3 G/DL (ref 3.5–5)
ALP SERPL-CCNC: 75 U/L (ref 34–104)
ALT SERPL W P-5'-P-CCNC: 18 U/L (ref 7–52)
ANION GAP SERPL CALCULATED.3IONS-SCNC: 7 MMOL/L (ref 4–13)
AST SERPL W P-5'-P-CCNC: 23 U/L (ref 13–39)
BASOPHILS # BLD AUTO: 0.08 THOUSANDS/ΜL (ref 0–0.1)
BASOPHILS NFR BLD AUTO: 1 % (ref 0–1)
BILIRUB SERPL-MCNC: 0.59 MG/DL (ref 0.2–1)
BUN SERPL-MCNC: 15 MG/DL (ref 5–25)
CALCIUM SERPL-MCNC: 9.4 MG/DL (ref 8.4–10.2)
CHLORIDE SERPL-SCNC: 106 MMOL/L (ref 96–108)
CHOLEST SERPL-MCNC: 147 MG/DL (ref ?–200)
CO2 SERPL-SCNC: 30 MMOL/L (ref 21–32)
CREAT SERPL-MCNC: 0.75 MG/DL (ref 0.6–1.3)
EOSINOPHIL # BLD AUTO: 0.21 THOUSAND/ΜL (ref 0–0.61)
EOSINOPHIL NFR BLD AUTO: 3 % (ref 0–6)
ERYTHROCYTE [DISTWIDTH] IN BLOOD BY AUTOMATED COUNT: 13.3 % (ref 11.6–15.1)
EST. AVERAGE GLUCOSE BLD GHB EST-MCNC: 117 MG/DL
GFR SERPL CREATININE-BSD FRML MDRD: 79 ML/MIN/1.73SQ M
GLUCOSE P FAST SERPL-MCNC: 101 MG/DL (ref 65–99)
HBA1C MFR BLD: 5.7 %
HCT VFR BLD AUTO: 42 % (ref 34.8–46.1)
HDLC SERPL-MCNC: 49 MG/DL
HGB BLD-MCNC: 13.4 G/DL (ref 11.5–15.4)
IMM GRANULOCYTES # BLD AUTO: 0.02 THOUSAND/UL (ref 0–0.2)
IMM GRANULOCYTES NFR BLD AUTO: 0 % (ref 0–2)
LDLC SERPL CALC-MCNC: 75 MG/DL (ref 0–100)
LYMPHOCYTES # BLD AUTO: 2.35 THOUSANDS/ΜL (ref 0.6–4.47)
LYMPHOCYTES NFR BLD AUTO: 33 % (ref 14–44)
MCH RBC QN AUTO: 31.2 PG (ref 26.8–34.3)
MCHC RBC AUTO-ENTMCNC: 31.9 G/DL (ref 31.4–37.4)
MCV RBC AUTO: 98 FL (ref 82–98)
MONOCYTES # BLD AUTO: 0.74 THOUSAND/ΜL (ref 0.17–1.22)
MONOCYTES NFR BLD AUTO: 11 % (ref 4–12)
NEUTROPHILS # BLD AUTO: 3.63 THOUSANDS/ΜL (ref 1.85–7.62)
NEUTS SEG NFR BLD AUTO: 52 % (ref 43–75)
NRBC BLD AUTO-RTO: 0 /100 WBCS
PLATELET # BLD AUTO: 278 THOUSANDS/UL (ref 149–390)
PMV BLD AUTO: 10.2 FL (ref 8.9–12.7)
POTASSIUM SERPL-SCNC: 3.9 MMOL/L (ref 3.5–5.3)
PROT SERPL-MCNC: 7.1 G/DL (ref 6.4–8.4)
RBC # BLD AUTO: 4.3 MILLION/UL (ref 3.81–5.12)
SODIUM SERPL-SCNC: 143 MMOL/L (ref 135–147)
T4 FREE SERPL-MCNC: 0.77 NG/DL (ref 0.61–1.12)
TRIGL SERPL-MCNC: 113 MG/DL (ref ?–150)
TSH SERPL DL<=0.05 MIU/L-ACNC: 4.54 UIU/ML (ref 0.45–4.5)
WBC # BLD AUTO: 7.03 THOUSAND/UL (ref 4.31–10.16)

## 2025-01-03 PROCEDURE — G2211 COMPLEX E/M VISIT ADD ON: HCPCS | Performed by: NURSE PRACTITIONER

## 2025-01-03 PROCEDURE — 99214 OFFICE O/P EST MOD 30 MIN: CPT | Performed by: NURSE PRACTITIONER

## 2025-01-03 NOTE — PROGRESS NOTES
"Name: Krupa Baxter      : 1950      MRN: [id number]  Encounter Provider: ELAYNE Dominguez  Encounter Date: 1/3/2025   Encounter department: [facility] INTERNAL MEDICINE  :  Assessment & Plan  Possible Cerebral amyloid angiopathy  (HCC)  MRI brain scheduled for march.        Internal carotid aneurysm  CTA head scheduled in April.        Essential hypertension  BP stable.  Continue amlodipine.        Lung nodules  CT chest scheduled this month.        Hypothyroidism, unspecified type  On levothyroxine.        Left acoustic neuroma (HCC)  On trileptal.        Prediabetes  Check A1C.        Hypercholesterolemia  On a statin.               History of Present Illness     Krupa is here today for follow up  She is doing well.   Her abdominal pain and back pain has resolved since getting her gallbladder removed.       Review of Systems   Constitutional:  Negative for activity change, appetite change, fatigue and unexpected weight change.   Eyes:  Negative for visual disturbance.   Respiratory:  Negative for cough and shortness of breath.    Cardiovascular:  Negative for chest pain, palpitations and leg swelling.   Gastrointestinal:  Negative for abdominal pain, blood in stool, constipation and diarrhea.   Genitourinary:  Negative for difficulty urinating.   Musculoskeletal:  Negative for arthralgias.   Skin:  Negative for rash.   Neurological:  Negative for dizziness, weakness, light-headedness and headaches.   Psychiatric/Behavioral:  Negative for sleep disturbance.        Objective   /80   Pulse 91   Temp (!) 96.9 °F (36.1 °C)   Ht 5' 2.5\" (1.588 m)   Wt 64.9 kg (143 lb)   SpO2 100%   BMI 25.74 kg/m²      Physical Exam  Vitals reviewed.   Constitutional:       Appearance: Normal appearance. She is well-developed.   HENT:      Head: Normocephalic and atraumatic.   Eyes:      Conjunctiva/sclera: Conjunctivae normal.   Cardiovascular:      Rate and Rhythm: Normal rate and regular " rhythm.      Heart sounds: Normal heart sounds.   Pulmonary:      Effort: Pulmonary effort is normal.      Breath sounds: Normal breath sounds.   Abdominal:      General: Bowel sounds are normal.      Palpations: Abdomen is soft.   Musculoskeletal:         General: Normal range of motion.      Right lower leg: No edema.      Left lower leg: No edema.   Skin:     General: Skin is warm and dry.   Neurological:      Mental Status: She is alert and oriented to person, place, and time.   Psychiatric:         Mood and Affect: Mood normal.         Behavior: Behavior normal.

## 2025-01-06 ENCOUNTER — RESULTS FOLLOW-UP (OUTPATIENT)
Dept: INTERNAL MEDICINE CLINIC | Facility: CLINIC | Age: 75
End: 2025-01-06

## 2025-01-06 DIAGNOSIS — E03.9 HYPOTHYROIDISM, UNSPECIFIED TYPE: ICD-10-CM

## 2025-01-06 RX ORDER — LEVOTHYROXINE SODIUM 88 UG/1
TABLET ORAL
Start: 2025-01-06

## 2025-01-06 NOTE — TELEPHONE ENCOUNTER
I spoke with Krupa and gave results.  She will increase levothyroxine to 1 tab for 6 days and 2 tbs on the 7th day and have lab work done in 4-6 weeks.

## 2025-01-06 NOTE — TELEPHONE ENCOUNTER
----- Message from ELAYNE Elias sent at 1/6/2025 10:35 AM EST -----  Labs look ok overall.  Sugars have improved.   Thyroid levels slightly up. Take 1 extra levothyroxine 1 day a week, so 1 tablet daily for 6 days and 2 tablets daily for 1 day a week. Recheck TSH in 4-6 weeks.

## 2025-01-16 ENCOUNTER — APPOINTMENT (OUTPATIENT)
Dept: LAB | Age: 75
End: 2025-01-16
Payer: MEDICARE

## 2025-01-16 DIAGNOSIS — R39.9 UTI SYMPTOMS: Primary | ICD-10-CM

## 2025-01-16 LAB
BACTERIA UR QL AUTO: ABNORMAL /HPF
BILIRUB UR QL STRIP: NEGATIVE
CAOX CRY URNS QL MICRO: ABNORMAL /HPF
CLARITY UR: ABNORMAL
COLOR UR: YELLOW
GLUCOSE UR STRIP-MCNC: NEGATIVE MG/DL
HGB UR QL STRIP.AUTO: ABNORMAL
KETONES UR STRIP-MCNC: NEGATIVE MG/DL
LEUKOCYTE ESTERASE UR QL STRIP: ABNORMAL
MUCOUS THREADS UR QL AUTO: ABNORMAL
NITRITE UR QL STRIP: NEGATIVE
NON-SQ EPI CELLS URNS QL MICRO: ABNORMAL /HPF
PH UR STRIP.AUTO: 6 [PH]
PROT UR STRIP-MCNC: ABNORMAL MG/DL
RBC #/AREA URNS AUTO: ABNORMAL /HPF
SP GR UR STRIP.AUTO: 1.02 (ref 1–1.03)
UROBILINOGEN UR STRIP-ACNC: <2 MG/DL
WBC #/AREA URNS AUTO: ABNORMAL /HPF

## 2025-01-16 PROCEDURE — 87086 URINE CULTURE/COLONY COUNT: CPT

## 2025-01-16 PROCEDURE — 87186 SC STD MICRODIL/AGAR DIL: CPT

## 2025-01-16 PROCEDURE — 87077 CULTURE AEROBIC IDENTIFY: CPT

## 2025-01-16 PROCEDURE — 81001 URINALYSIS AUTO W/SCOPE: CPT

## 2025-01-16 RX ORDER — CEPHALEXIN 500 MG/1
500 CAPSULE ORAL EVERY 12 HOURS SCHEDULED
Qty: 10 CAPSULE | Refills: 0 | Status: SHIPPED | OUTPATIENT
Start: 2025-01-16 | End: 2025-01-21

## 2025-01-17 ENCOUNTER — HOSPITAL ENCOUNTER (OUTPATIENT)
Dept: RADIOLOGY | Age: 75
Discharge: HOME | End: 2025-01-17
Payer: MEDICARE

## 2025-01-17 VITALS — BODY MASS INDEX: 25.16 KG/M2 | HEIGHT: 63 IN | WEIGHT: 142 LBS

## 2025-01-17 DIAGNOSIS — Z12.31 SCREENING MAMMOGRAM FOR BREAST CANCER: ICD-10-CM

## 2025-01-17 PROCEDURE — 77063 BREAST TOMOSYNTHESIS BI: CPT

## 2025-01-17 PROCEDURE — 77067 SCR MAMMO BI INCL CAD: CPT

## 2025-01-18 LAB — BACTERIA UR CULT: ABNORMAL

## 2025-01-20 ENCOUNTER — RESULTS FOLLOW-UP (OUTPATIENT)
Dept: INTERNAL MEDICINE CLINIC | Facility: CLINIC | Age: 75
End: 2025-01-20

## 2025-01-25 DIAGNOSIS — G50.0 TRIGEMINAL NEURALGIA OF LEFT SIDE OF FACE: ICD-10-CM

## 2025-01-25 RX ORDER — OXCARBAZEPINE 150 MG/1
150 TABLET, FILM COATED ORAL 2 TIMES DAILY
Qty: 60 TABLET | Refills: 5 | Status: SHIPPED | OUTPATIENT
Start: 2025-01-25

## 2025-01-31 ENCOUNTER — HOSPITAL ENCOUNTER (OUTPATIENT)
Dept: RADIOLOGY | Age: 75
Discharge: HOME | End: 2025-01-31
Payer: MEDICARE

## 2025-01-31 DIAGNOSIS — J18.9 SEPSIS DUE TO PNEUMONIA (HCC): ICD-10-CM

## 2025-01-31 DIAGNOSIS — R91.8 LUNG NODULES: ICD-10-CM

## 2025-01-31 DIAGNOSIS — A41.9 SEPSIS DUE TO PNEUMONIA (HCC): ICD-10-CM

## 2025-01-31 PROCEDURE — 71250 CT THORAX DX C-: CPT

## 2025-02-06 ENCOUNTER — RESULTS FOLLOW-UP (OUTPATIENT)
Dept: INTERNAL MEDICINE CLINIC | Facility: CLINIC | Age: 75
End: 2025-02-06

## 2025-02-08 LAB — TSH SERPL-ACNC: 1.81 MIU/L (ref 0.4–4.5)

## 2025-03-03 ENCOUNTER — HOSPITAL ENCOUNTER (OUTPATIENT)
Dept: RADIOLOGY | Age: 75
Discharge: HOME | End: 2025-03-03
Payer: MEDICARE

## 2025-03-03 DIAGNOSIS — I68.0 CEREBRAL AMYLOID ANGIOPATHY  (HCC): ICD-10-CM

## 2025-03-03 DIAGNOSIS — E85.4 CEREBRAL AMYLOID ANGIOPATHY  (HCC): ICD-10-CM

## 2025-03-03 PROCEDURE — 70551 MRI BRAIN STEM W/O DYE: CPT

## 2025-03-05 ENCOUNTER — RESULTS FOLLOW-UP (OUTPATIENT)
Dept: NEUROLOGY | Facility: CLINIC | Age: 75
End: 2025-03-05

## 2025-03-18 DIAGNOSIS — E78.00 HYPERCHOLESTEROLEMIA: ICD-10-CM

## 2025-03-18 DIAGNOSIS — E03.9 HYPOTHYROIDISM, UNSPECIFIED TYPE: ICD-10-CM

## 2025-03-19 RX ORDER — LEVOTHYROXINE SODIUM 88 UG/1
88 TABLET ORAL DAILY
Qty: 90 TABLET | Refills: 1 | Status: SHIPPED | OUTPATIENT
Start: 2025-03-19

## 2025-03-19 RX ORDER — ATORVASTATIN CALCIUM 40 MG/1
40 TABLET, FILM COATED ORAL DAILY
Qty: 90 TABLET | Refills: 1 | Status: SHIPPED | OUTPATIENT
Start: 2025-03-19

## 2025-03-20 ENCOUNTER — OFFICE VISIT (OUTPATIENT)
Dept: NEUROLOGY | Facility: CLINIC | Age: 75
End: 2025-03-20
Payer: MEDICARE

## 2025-03-20 VITALS
OXYGEN SATURATION: 98 % | TEMPERATURE: 97 F | WEIGHT: 148.6 LBS | BODY MASS INDEX: 26.33 KG/M2 | HEART RATE: 97 BPM | SYSTOLIC BLOOD PRESSURE: 106 MMHG | DIASTOLIC BLOOD PRESSURE: 60 MMHG | HEIGHT: 63 IN

## 2025-03-20 DIAGNOSIS — I67.1 INTERNAL CAROTID ANEURYSM: ICD-10-CM

## 2025-03-20 DIAGNOSIS — Z86.73 HISTORY OF TIA (TRANSIENT ISCHEMIC ATTACK): ICD-10-CM

## 2025-03-20 DIAGNOSIS — E85.4 CEREBRAL AMYLOID ANGIOPATHY  (HCC): Primary | ICD-10-CM

## 2025-03-20 DIAGNOSIS — I68.0 CEREBRAL AMYLOID ANGIOPATHY  (HCC): Primary | ICD-10-CM

## 2025-03-20 PROCEDURE — 99214 OFFICE O/P EST MOD 30 MIN: CPT

## 2025-03-20 NOTE — PROGRESS NOTES
Name: Krupa Baxter      : 1950      MRN: [id number]  Encounter Provider: ELAYNE Lopez  Encounter Date: 3/20/2025   Encounter department: [facility] [place]  :  Assessment & Plan  Possible Cerebral amyloid angiopathy  (HCC)  Krupa Baxter is a 74 year old female who had an episode 9/3/24 of right facial weakness, RUE incoordination, RUE/RLE weakness, and expressive aphasia concerning for TIA. Her stroke work up was benign however her MRI did reveal hemosiderin staining along the sulci of the left superior frontal lobe and scattered areas of peripherally located remote blood products concerning for CAA, making possibility of amyloid spell as etiology of her episode unable to be ruled out. She denies any prior hx of uncontrolled hypertension or head trauma. Given possibility of TIA, she was advised to start Aspirin 81 mg daily. Today we discussed the importance of tight BP control going forward due to possibility of CAA, as well as discussed fall precautions to avoid head trauma. Repeat MRI done while on Aspirin over 6 months, was stable with no new areas of hemosiderin staining or areas of micro hemorrhage. We may consider annual repeat; sooner if clinically indicated.        History of TIA (transient ischemic attack)  Krupa Baxter is a 74 year old female who had an episode 9/3/24 of right facial weakness, RUE incoordination, RUE/RLE weakness, and expressive aphasia concerning for TIA. Her stroke work up was benign however her MRI did reveal hemosiderin staining along the sulci of the left superior frontal lobe and scattered areas of peripherally located remote blood products concerning for CAA, making possibility of amyloid spell as etiology of her episode unable to be ruled out. Given possibility of TIA, she was advised to continue Aspirin 81 mg daily and atorvastatin 40 mg. She denies any reoccurrence of episode or symptoms since discharge. Her neurologic  exam is intact and non focal. Today we discussed the importance of tight BP control going forward due to possibility of CAA, as well as discussed fall precautions to avoid head trauma. We reviewed her stroke risk factors and management of the same. We also discussed possibility of JESSICA given her report of loud snoring and mouth breathing through the night. I recommended she consider a sleep study to rule out JESSICA and discussed risks of untreated JESSICA including CAD and CVA. She declines to pursue testing at this time despite education provided. She was provided stroke education and we reviewed stroke warning signs/symptoms and reasons to return by ambulance to the ER.          Internal carotid aneurysm  CTA H/N 9/3/24: Negative CTA head and neck for large vessel occlusion, dissection, or high-grade stenosis. 0.3 cm aneurysm in left ICA supraclinoid segment projecting inferomedially 0.2 cm aneurysm in right ICA supraclinoid segment projecting inferomedially.   Following with Neurosurgery  Repeat CTA planned for next month 4/2025         Patient Instructions   - Continue with good blood pressure control; I would recommend monitoring at home at least 3 times per week; Goal of <130/80  - Continue with good cholesterol control; Goal LDL <70  - Continue with good blood sugar control; Goal HgbA1c <7.0  - Will defer monitoring of cholesterol and blood sugar and management of hypertensive medications to the primary care provider  - Stay well hydrated by drinking enough water   - Will consider repeat MRI Brain to evaluate for micro hemorrhages in March 2026  - Eat a healthy diet, high in lean meats fish, turkey, chicken. Low in fats, cholesterol, sugars and sodium. Avoid canned foods,  get lets of fresh/frozen vegetables/fruits.  - Get routine exercise/physical activity as much as able to tolerate  - Keep follow ups with your other health care providers  - Follow up as scheduled with Neurosurgery for ICA aneurysms   - Continue  Aspirin 81 mg daily and Lipitor 40 mg daily.  - Fall precautions     I will plan for her to return to the office in 6-8 months time but would be happy to see her sooner if the need should arise.  If she has any symptoms concerning for TIA or stroke including sudden painless loss of vision or double vision, difficulty speaking or swallowing, vertigo/room spinning that does not quickly resolve, or weakness/numbness affecting 1 side of the face or body she should proceed by ambulance to the nearest emergency room immediately.     History of Present Illness     HPI Krupa Baxter is a 74 year old female who presents to follow up on her hx of TIA and possible CAA. She was last seen in follow up 10/17/2024.    Secondary Stroke Prevention  She continues on ASA 81 mg (was not on aspirin prior to TIA)   Atorvastatin 40 mg  Compliant with her medications, no issues affording or obtaining medications.   Denies excessive bruising or bleeding     Deficits  No reoccurrence of symptoms  Denies numbness, tingling, weakness, dizziness, headaches, vision changes or any new or worsening stroke like symptoms  Denies difficulty speaking, swallowing, walking      Monitoring  BP- she is not monitoring BP at home; denies prior uncontrolled hypertension in the past   Today BP is 106/60  HgbA1c 1/3/2025 5.7  LDL 1/3/2025 75  CTA H/N 9/3/24: Negative CTA head and neck for large vessel occlusion, dissection, or high-grade stenosis.  0.3 cm aneurysm in left ICA supraclinoid segment projecting inferomedially 0.2 cm aneurysm in right ICA supraclinoid segment projecting inferomedially. Recommend [facility] with [facility] at [phone]. Partially imaged mildly enhancing left vestibular schwannoma.  Followed up with Neurosurgery and is repeating CTA 4/17/2025     Safety  She lives with her spouse   Independent of all ADLs  No falls at home  She is managing her own  medications  assistive devices- none  Driving- no difficulty  Memory- none     Substance use    Smoking- never  Etoh- none; very rare social use  Drugs- never  Caffeine - coffe/tea 1-2 cups daily     Sleep  She reports loud snoring  She has never had a sleep study  If she lays on her back she breathes through her mouth during sleep  She declines testing at this time  She did start mouth taping      Diet  She reports having a balanced healthy diet  She eats out once a week  She does enjoy candy      Exercise  She is physically active  Walking      PT/OT/ST  No further PT     Mood  Denies anxiety or depression      Follow ups  Is s/p gallbladder removal 9/26/24  Did see Neuroosurgery 10/28/24- follow up CTA in 6 [facility]    Review of Systems   Constitutional:  Negative for appetite change, fatigue and fever.   HENT: Negative.  Negative for hearing loss, tinnitus, trouble swallowing and voice change.    Eyes: Negative.  Negative for photophobia, pain and visual disturbance.   Respiratory: Negative.  Negative for shortness of breath.    Cardiovascular: Negative.  Negative for palpitations.   Gastrointestinal: Negative.  Negative for nausea and vomiting.   Endocrine: Negative.  Negative for cold intolerance.   Genitourinary: Negative.  Negative for dysuria, frequency and urgency.   Musculoskeletal:  Negative for back pain, gait problem, myalgias, neck pain and neck stiffness.   Skin: Negative.  Negative for rash.   Allergic/Immunologic: Negative.    Neurological:  Negative for dizziness, tremors, seizures, syncope, facial asymmetry, speech difficulty, weakness, light-headedness, numbness and headaches.   Hematological: Negative.  Does not bruise/bleed easily.   Psychiatric/Behavioral: Negative.  Negative for confusion, hallucinations and sleep disturbance.    All other systems reviewed and are negative.    I have personally reviewed the MA's review of systems and made changes as necessary.    Current Outpatient  "Medications on File Prior to Visit   Medication Sig Dispense Refill    amLODIPine (NORVASC) 5 mg tablet TAKE 1 TABLET (5 MG TOTAL) BY MOUTH DAILY. 90 tablet 1    aspirin 81 mg chewable tablet Chew 1 tablet (81 mg total) daily 30 tablet 0    atorvastatin (LIPITOR) 40 mg tablet TAKE 1 TABLET BY MOUTH DAILY 90 tablet 1    levothyroxine 88 mcg tablet TAKE 1 TABLET BY MOUTH DAILY 90 tablet 1    OXcarbazepine (TRILEPTAL) 150 mg tablet TAKE 1 TABLET BY MOUTH TWICE A DAY 60 tablet 5     No current facility-administered medications on file prior to visit.         Objective   /60 (BP Location: Right arm, Patient Position: Sitting, Cuff Size: Standard)   Pulse 97   Temp (!) 97 °F (36.1 °C) (Temporal)   Ht 5' 2.5\" (1.588 m)   Wt 67.4 kg (148 lb 9.6 oz)   SpO2 98%   BMI 26.75 kg/m²     Neurological Exam  On neurological examination patient is alert, awake, oriented and in no distress. Speech is fluent without dysarthria or aphasia. She is able to rise easily without assistance from a seated position. Casual gait is normal including stance, stride, and arm swing.      Administrative Statements   I have spent a total time of 30 minutes in caring for this patient on the day of the visit/encounter including Diagnostic results, Prognosis, Risks and benefits of tx options, Instructions for management, Patient and family education, Importance of tx compliance, Risk factor reductions, Impressions, Counseling / Coordination of care, Documenting in the medical record, Reviewing/placing orders in the medical record (including tests, medications, and/or procedures), and Obtaining or reviewing history  .  "

## 2025-03-24 NOTE — ASSESSMENT & PLAN NOTE
CTA H/N 9/3/24: Negative CTA head and neck for large vessel occlusion, dissection, or high-grade stenosis. 0.3 cm aneurysm in left ICA supraclinoid segment projecting inferomedially 0.2 cm aneurysm in right ICA supraclinoid segment projecting inferomedially.   Following with Neurosurgery  Repeat CTA planned for next month 4/2025

## 2025-03-24 NOTE — ASSESSMENT & PLAN NOTE
Krupa Baxter is a 74 year old female who had an episode 9/3/24 of right facial weakness, RUE incoordination, RUE/RLE weakness, and expressive aphasia concerning for TIA. Her stroke work up was benign however her MRI did reveal hemosiderin staining along the sulci of the left superior frontal lobe and scattered areas of peripherally located remote blood products concerning for CAA, making possibility of amyloid spell as etiology of her episode unable to be ruled out. Given possibility of TIA, she was advised to continue Aspirin 81 mg daily and atorvastatin 40 mg. She denies any reoccurrence of episode or symptoms since discharge. Her neurologic exam is intact and non focal. Today we discussed the importance of tight BP control going forward due to possibility of CAA, as well as discussed fall precautions to avoid head trauma. We reviewed her stroke risk factors and management of the same. We also discussed possibility of JESSICA given her report of loud snoring and mouth breathing through the night. I recommended she consider a sleep study to rule out JESSICA and discussed risks of untreated JESSICA including CAD and CVA. She declines to pursue testing at this time despite education provided. She was provided stroke education and we reviewed stroke warning signs/symptoms and reasons to return by ambulance to the ER.

## 2025-03-24 NOTE — ASSESSMENT & PLAN NOTE
Krupa Baxter is a 74 year old female who had an episode 9/3/24 of right facial weakness, RUE incoordination, RUE/RLE weakness, and expressive aphasia concerning for TIA. Her stroke work up was benign however her MRI did reveal hemosiderin staining along the sulci of the left superior frontal lobe and scattered areas of peripherally located remote blood products concerning for CAA, making possibility of amyloid spell as etiology of her episode unable to be ruled out. She denies any prior hx of uncontrolled hypertension or head trauma. Given possibility of TIA, she was advised to start Aspirin 81 mg daily. Today we discussed the importance of tight BP control going forward due to possibility of CAA, as well as discussed fall precautions to avoid head trauma. Repeat MRI done while on Aspirin over 6 months, was stable with no new areas of hemosiderin staining or areas of micro hemorrhage. We may consider annual repeat; sooner if clinically indicated.

## 2025-03-31 ENCOUNTER — RA CDI HCC (OUTPATIENT)
Dept: OTHER | Facility: HOSPITAL | Age: 75
End: 2025-03-31

## 2025-04-04 ENCOUNTER — OFFICE VISIT (OUTPATIENT)
Dept: INTERNAL MEDICINE CLINIC | Facility: CLINIC | Age: 75
End: 2025-04-04
Payer: MEDICARE

## 2025-04-04 VITALS
HEIGHT: 63 IN | HEART RATE: 86 BPM | SYSTOLIC BLOOD PRESSURE: 114 MMHG | TEMPERATURE: 96.2 F | WEIGHT: 148 LBS | BODY MASS INDEX: 26.22 KG/M2 | OXYGEN SATURATION: 98 % | DIASTOLIC BLOOD PRESSURE: 70 MMHG

## 2025-04-04 DIAGNOSIS — E78.00 HYPERCHOLESTEROLEMIA: ICD-10-CM

## 2025-04-04 DIAGNOSIS — I10 ESSENTIAL HYPERTENSION: ICD-10-CM

## 2025-04-04 DIAGNOSIS — Z00.00 MEDICARE ANNUAL WELLNESS VISIT, SUBSEQUENT: Primary | ICD-10-CM

## 2025-04-04 DIAGNOSIS — R73.03 PREDIABETES: ICD-10-CM

## 2025-04-04 DIAGNOSIS — E03.9 HYPOTHYROIDISM, UNSPECIFIED TYPE: ICD-10-CM

## 2025-04-04 DIAGNOSIS — Z78.0 POST-MENOPAUSAL: ICD-10-CM

## 2025-04-04 DIAGNOSIS — I68.0 CEREBRAL AMYLOID ANGIOPATHY  (HCC): ICD-10-CM

## 2025-04-04 DIAGNOSIS — E85.4 CEREBRAL AMYLOID ANGIOPATHY  (HCC): ICD-10-CM

## 2025-04-04 DIAGNOSIS — R91.8 LUNG NODULES: ICD-10-CM

## 2025-04-04 DIAGNOSIS — D33.3 LEFT ACOUSTIC NEUROMA (HCC): ICD-10-CM

## 2025-04-04 DIAGNOSIS — I67.1 INTERNAL CAROTID ANEURYSM: ICD-10-CM

## 2025-04-04 PROBLEM — R07.89 CHEST WALL PAIN: Status: RESOLVED | Noted: 2024-10-22 | Resolved: 2025-04-04

## 2025-04-04 PROBLEM — R19.7 DIARRHEA: Status: RESOLVED | Noted: 2021-07-26 | Resolved: 2025-04-04

## 2025-04-04 PROBLEM — R10.11 CHRONIC RUQ PAIN: Status: RESOLVED | Noted: 2021-09-13 | Resolved: 2025-04-04

## 2025-04-04 PROBLEM — G89.29 CHRONIC RUQ PAIN: Status: RESOLVED | Noted: 2021-09-13 | Resolved: 2025-04-04

## 2025-04-04 PROCEDURE — 99214 OFFICE O/P EST MOD 30 MIN: CPT | Performed by: NURSE PRACTITIONER

## 2025-04-04 PROCEDURE — G2211 COMPLEX E/M VISIT ADD ON: HCPCS | Performed by: NURSE PRACTITIONER

## 2025-04-04 PROCEDURE — G0439 PPPS, SUBSEQ VISIT: HCPCS | Performed by: NURSE PRACTITIONER

## 2025-04-04 NOTE — PROGRESS NOTES
Name: Krupa Baxter      : 1950      MRN: [id number]  Encounter Provider: ELAYNE Dominguez  Encounter Date: 2025   Encounter department: [facility]  :  Assessment & Plan  Medicare annual wellness visit, subsequent         Essential hypertension  Well controlled.  On amlodipine.        Internal carotid aneurysm  Scheduled for CTA this month.        Possible Cerebral amyloid angiopathy  (HCC)  Saw neurology recently.   On asa and statin daily.        Lung nodules  Unchanged on recent CT chest.        Hypothyroidism, unspecified type  Adequately replaced.        Left acoustic neuroma (HCC)  On trileptal.        Hypercholesterolemia  Continue statin.   Orders:    Comprehensive metabolic panel; Future    Lipid Panel with Direct LDL reflex; Future    Prediabetes  Check A1C.   Orders:    Hemoglobin A1C; Future    Post-menopausal    Orders:    DXA bone density spine hip and pelvis; Future       Preventive health issues were discussed with patient, and age appropriate screening tests were ordered as noted in patient's After Visit Summary. Personalized health advice and appropriate referrals for health education or preventive services given if needed, as noted in patient's After Visit Summary.    History of Present Illness     Krupa is here today for follow up.  She is doing well.     She had an episode yesterday, she got up at 3 am to use the restroom overnight. It took an extra second for her right eye to open. She's concerned about another TIA.   However she had no other symptoms and no symptoms since.            Patient Care Team:  ELAYNE Dominguez as PCP - General (Internal Medicine)    Review of Systems   Constitutional:  Negative for activity change, appetite change, fatigue and unexpected weight change.   Eyes:  Negative for visual disturbance.   Respiratory:  Negative for cough and shortness of breath.    Cardiovascular:  Negative for chest pain, palpitations  and leg swelling.   Gastrointestinal:  Negative for abdominal pain, blood in stool, constipation and diarrhea.   Genitourinary:  Negative for difficulty urinating.   Musculoskeletal:  Negative for arthralgias.   Skin:  Negative for rash.   Neurological:  Negative for dizziness, weakness, light-headedness and headaches.   Psychiatric/Behavioral:  Negative for sleep disturbance.      Medical History Reviewed by provider this encounter:       Annual Wellness Visit Questionnaire   Krupa is here for her Subsequent Wellness visit. Last Medicare Wellness visit information reviewed, patient interviewed and updates made to the record today.      Health Risk Assessment:   Patient rates overall health as good. Patient feels that their physical health rating is same. Patient is satisfied with their life. Eyesight was rated as slightly worse. Hearing was rated as slightly worse. Patient feels that their emotional and mental health rating is same. Patients states they are never, rarely angry. Patient states they are sometimes unusually tired/fatigued. Pain experienced in the last 7 days has been some. Patient's pain rating has been 2/10. Patient states that she has experienced no weight loss or gain in last 6 months.     Depression Screening:   PHQ-2 Score: 0      Fall Risk Screening:   In the past year, patient has experienced: no history of falling in past year      Urinary Incontinence Screening:   Patient has leaked urine accidently in the last six months.     Home Safety:  Patient does not have trouble with stairs inside or outside of their home. Patient has working smoke alarms and has working carbon monoxide detector. Home safety hazards include: none.     Nutrition:   Current diet is Regular and Frequent junk food. I like my sweets    Medications:   Patient is currently taking over-the-counter supplements. OTC medications include: see medication list. Patient is able to manage medications.     Activities of Daily Living  (ADLs)/Instrumental Activities of Daily Living (IADLs):   Walk and transfer into and out of bed and chair?: Yes  Dress and groom yourself?: Yes    Bathe or shower yourself?: Yes    Feed yourself? Yes  Do your laundry/housekeeping?: Yes  Manage your money, pay your bills and track your expenses?: Yes  Make your own meals?: Yes    Do your own shopping?: Yes    Previous Hospitalizations:   Any hospitalizations or ED visits within the last 12 months?: No      Advance Care Planning:   Living will: Yes    Durable POA for healthcare: Yes    Advanced directive: Yes      PREVENTIVE SCREENINGS      Cardiovascular Screening:    General: Screening Not Indicated and History Lipid Disorder      Diabetes Screening:     General: Screening Current      Colorectal Cancer Screening:     General: Screening Current      Breast Cancer Screening:     General: Screening Current      Cervical Cancer Screening:    General: Screening Not Indicated      Osteoporosis Screening:      Due for: DXA Axial      Lung Cancer Screening:     General: Screening Not Indicated      Hepatitis C Screening:    General: Screening Current    Screening, Brief Intervention, and Referral to Treatment (SBIRT)     Screening  Typical number of drinks in a day: 0  Typical number of drinks in a week: 0  Interpretation: Low risk drinking behavior.    AUDIT-C Screenin) How often did you have a drink containing alcohol in the past year? monthly or less  2) How many drinks did you have on a typical day when you were drinking in the past year? 1 to 2  3) How often did you have 6 or more drinks on one occasion in the past year? never    AUDIT-C Score: 1  Interpretation: Score 0-2 (female): Negative screen for alcohol misuse    Single Item Drug Screening:  How often have you used an illegal drug (including marijuana) or a prescription medication for non-medical reasons in the past year? never    Single Item Drug Screen Score: 0  Interpretation: Negative screen for  "possible drug use disorder    Social Drivers of Health     Financial Resource Strain: Low Risk  (3/11/2024)    Overall Financial Resource Strain (CARDIA)     Difficulty of Paying Living Expenses: Not hard at all   Food Insecurity: No Food Insecurity (3/31/2025)    Hunger Vital Sign     Worried About Running Out of Food in the Last Year: Never true     Ran Out of Food in the Last Year: Never true   Transportation Needs: No Transportation Needs (3/31/2025)    PRAPARE - Transportation     Lack of Transportation (Medical): No     Lack of Transportation (Non-Medical): No   Housing Stability: Low Risk  (3/31/2025)    Housing Stability Vital Sign     Unable to Pay for Housing in the Last Year: No     Number of Times Moved in the Last Year: 0     Homeless in the Last Year: No   Utilities: Not At Risk (3/31/2025)    [facility] Utilities     Threatened with loss of utilities: No     No results found.    Objective   /70   Pulse 86   Temp (!) 96.2 °F (35.7 °C)   Ht 5' 2.5\" (1.588 m)   Wt 67.1 kg (148 lb)   SpO2 98%   BMI 26.64 kg/m²     Physical Exam  Vitals reviewed.   Constitutional:       Appearance: Normal appearance. She is well-developed.   HENT:      Head: Normocephalic and atraumatic.      Right Ear: Tympanic membrane, ear canal and external ear normal.      Left Ear: Tympanic membrane, ear canal and external ear normal.   Eyes:      Conjunctiva/sclera: Conjunctivae normal.   Cardiovascular:      Rate and Rhythm: Normal rate and regular rhythm.      Heart sounds: Normal heart sounds.   Pulmonary:      Effort: Pulmonary effort is normal.      Breath sounds: Normal breath sounds.   Abdominal:      General: Bowel sounds are normal.      Palpations: Abdomen is soft.   Musculoskeletal:         General: Normal range of motion.      Cervical back: Neck supple.      Right lower leg: No edema.      Left lower leg: No edema.   Skin:     General: Skin is warm and dry.   Neurological:      Mental Status: She is alert and " oriented to person, place, and time.   Psychiatric:         Mood and Affect: Mood normal.         Behavior: Behavior normal.

## 2025-04-09 LAB
BUN SERPL-MCNC: 16 MG/DL (ref 7–25)
BUN/CREAT SERPL: NORMAL (CALC) (ref 6–22)
CREAT SERPL-MCNC: 0.88 MG/DL (ref 0.6–1)
GFR/BSA.PRED SERPLBLD CYS-BASED-ARV: 69 ML/MIN/1.73M2

## 2025-04-17 ENCOUNTER — HOSPITAL ENCOUNTER (OUTPATIENT)
Dept: RADIOLOGY | Age: 75
Discharge: HOME | End: 2025-04-17
Payer: MEDICARE

## 2025-04-17 DIAGNOSIS — I67.1 INTERNAL CAROTID ANEURYSM: ICD-10-CM

## 2025-04-17 PROCEDURE — 70496 CT ANGIOGRAPHY HEAD: CPT

## 2025-04-17 RX ADMIN — IOHEXOL 85 ML: 350 INJECTION, SOLUTION INTRAVENOUS at 10:18

## 2025-04-21 DIAGNOSIS — I10 ESSENTIAL HYPERTENSION: ICD-10-CM

## 2025-04-21 RX ORDER — AMLODIPINE BESYLATE 5 MG/1
5 TABLET ORAL DAILY
Qty: 90 TABLET | Refills: 1 | Status: SHIPPED | OUTPATIENT
Start: 2025-04-21

## 2025-04-29 ENCOUNTER — OFFICE VISIT (OUTPATIENT)
Dept: NEUROSURGERY | Facility: CLINIC | Age: 75
End: 2025-04-29
Payer: MEDICARE

## 2025-04-29 VITALS
BODY MASS INDEX: 26.4 KG/M2 | HEART RATE: 88 BPM | HEIGHT: 63 IN | TEMPERATURE: 97.3 F | WEIGHT: 149 LBS | OXYGEN SATURATION: 98 % | DIASTOLIC BLOOD PRESSURE: 68 MMHG | SYSTOLIC BLOOD PRESSURE: 118 MMHG | RESPIRATION RATE: 16 BRPM

## 2025-04-29 DIAGNOSIS — I67.1 CEREBRAL ANEURYSM, NONRUPTURED: Primary | ICD-10-CM

## 2025-04-29 PROCEDURE — 99213 OFFICE O/P EST LOW 20 MIN: CPT | Performed by: PHYSICIAN ASSISTANT

## 2025-04-29 NOTE — PROGRESS NOTES
Name: Krupa Baxter      : 1950      MRN: [id number]  Encounter Provider: Addison Servin PA-C  Encounter Date: 2025   Encounter [facility]  :  Assessment & Plan  Cerebral aneurysm, nonruptured  Patient is a 74 years old presenting with past medical history of hypertension, CAA, pneumonia with sepsis, biliary dyskinesia, hypothyroidism, BPPV, left acoustic neuroma, hypercholesterolemia, and recently TIA and incidentally detected cavernous ICA aneurysms.    Patient is here today for 6 months follow up with CTA head. She reports doing fine, denies any headache, vision issues, nausea, vomiting, seizures, speech or swallowing problem.    No weakness in the extremities, gait issues, hearing problem, numbness or paresthesia in the extremities.  No gait issues and denies any bowel/bladder dysfunction or saddle anesthesia.    Imagings:     CTA head with and without contrast demonstrated stable 0.3 cm left supraclinoid and 0.2 cm right supraclinoid aneurysms.      Plan:    Patient without symptoms in the neurologically non-focal  Commend continue surveillance with CTA in 1 year.  Advised patient to call office with development of neurological symptoms.  Questions and concerns were answered to patient satisfaction.  Patient verbalized understanding's and agreed with the plan.  Orders:    CTA head w wo contrast; Future    BUN/Creatinine Ratio; Future        History of Present Illness     Krupa Baxter is a 75 y.o. female here today for 6 months follow-up of brain aneurysm    HPI   See detailed discussion above  Review of Systems   HENT:  Positive for tinnitus (left ear, not new.).    Eyes:  Negative for visual disturbance.   Musculoskeletal:  Negative for gait problem.   Neurological:  Negative for dizziness, weakness, light-headedness, numbness and headaches.   Hematological:  Does not bruise/bleed easily (asa81).     I have personally reviewed the MA's review of  systems and made changes as necessary.    Past Medical History   Past Medical History:   Diagnosis Date    Acoustic neuroma (HCC)     left ear, decreased hearing    Aneurysm (HCC) 10/21/2025    Arthritis     right knee    Disease of thyroid gland     History of radiation therapy     Left acoustic neuroma    Hyperlipidemia     TIA (transient ischemic attack) 9/3/2024    Lasted about 2 hours    Trigeminal neuralgia of left side of face     Urethral hypermobility     Uterine prolapse      Past Surgical History:   Procedure Laterality Date    CHOLECYSTECTOMY  2024    COLONOSCOPY      MA CMBND ANTERPOST COLPORRAPHY W/CYSTO N/A 2019    Procedure: A&P COLPORRHAPHY;  Surgeon: Baron Coyle MD;  Location: [facility];  Service: UroGynecology           MA COLPOPEXY VAGINAL EXTRAPERITONEAL APPROACH N/A 2019    Procedure: ANTERIOR VE COLPOPEXY;  Surgeon: Baron Coyle MD;  Location: [facility];  Service: UroGynecology           MA CYSTOURETHROSCOPY N/A 2019    Procedure: CYSTOSCOPY;  Surgeon: Baron oCyle MD;  Location: [facility];  Service: UroGynecology           MA LAPAROSCOPY SURG CHOLECYSTECTOMY N/A 2024    Procedure: CHOLECYSTECTOMY LAPAROSCOPIC;  Surgeon: Hernando Wright DO;  [facility];  Service: General    MA SLING OPERATION STRESS INCONTINENCE N/A 2019    Procedure: PUBOVAGINAL SLING;  Surgeon: Baron Coyle MD;  Location: [facility];  Service: UroGynecology           TUBAL LIGATION      WISDOM TOOTH EXTRACTION       Family History   Problem Relation Age of Onset    Hypertension Mother     Diabetes Father          2016    Asthma Father          2016    Lung disease Father     Dementia Father     Neuropathy Father     Thyroid disease Sister     No Known Problems Sister     No Known Problems Sister     Thyroid disease Daughter     No Known Problems Daughter     No Known Problems Daughter     No Known Problems Maternal Grandmother     No Known  "Problems Paternal Grandmother     Arthritis Family     Diabetes Family     Hypertension Family     Osteoporosis Family     Kidney disease Family     Thyroid disease Family     No Known Problems Paternal Aunt     No Known Problems Paternal Aunt     Mental illness Neg Hx     Substance Abuse Neg Hx     Alcohol abuse Neg Hx     Depression Neg Hx      she reports that she has never smoked. She has never used smokeless tobacco. She reports current alcohol use. She reports that she does not use drugs.  Current Outpatient Medications   Medication Instructions    amLODIPine (NORVASC) 5 mg, Oral, Daily    aspirin 81 mg, Oral, Daily    atorvastatin (LIPITOR) 40 mg, Oral, Daily    DICLOFENAC EX Apply topically    levothyroxine 88 mcg, Oral, Daily    OXcarbazepine (TRILEPTAL) 150 mg, Oral, 2 times daily     Allergies   Allergen Reactions    Erythromycin Rash    Carbamazepine Rash      Objective   /68 (BP Location: Left arm, Patient Position: Sitting, Cuff Size: Standard)   Pulse 88   Temp (!) 97.3 °F (36.3 °C) (Temporal)   Resp 16   Ht 5' 2.5\" (1.588 m)   Wt 67.6 kg (149 lb)   SpO2 98%   BMI 26.82 kg/m²     Physical Exam  HENT:      Head: Normocephalic and atraumatic.   Eyes:      General: Lids are normal.      Extraocular Movements: Extraocular movements intact.      Pupils: Pupils are equal, round, and reactive to light.   Pulmonary:      Effort: Pulmonary effort is normal.   Musculoskeletal:         General: Normal range of motion.      Cervical back: Normal range of motion.   Neurological:      General: No focal deficit present.      Mental Status: She is oriented to person, place, and time.      Motor: Motor strength is normal.     Deep Tendon Reflexes:      Reflex Scores:       Tricep reflexes are 2+ on the right side and 2+ on the left side.       Bicep reflexes are 2+ on the right side and 2+ on the left side.       Brachioradialis reflexes are 2+ on the right side and 2+ on the left side.       Patellar " reflexes are 2+ on the right side and 2+ on the left side.       Achilles reflexes are 2+ on the right side and 2+ on the left side.  Psychiatric:         Speech: Speech normal.       Neurological Exam  Mental Status  Awake, alert and oriented to person, place and time. Oriented to person, place, time and situation. Oriented to person, place, and time. Speech is normal. Language is fluent with no aphasia.    Cranial Nerves  CN II: Visual acuity is normal. Visual fields full to confrontation.  CN III, IV, VI: Extraocular movements intact bilaterally. Normal lids and orbits bilaterally. Pupils equal round and reactive to light bilaterally.  CN V: Facial sensation is normal.  CN VII: Full and symmetric facial movement.  CN VIII: Hearing is normal.  CN IX, X: Palate elevates symmetrically. Normal gag reflex.  CN XI: Shoulder shrug strength is normal.  CN XII: Tongue midline without atrophy or fasciculations.    Motor  Normal muscle bulk throughout. No fasciculations present. Normal muscle tone. No abnormal involuntary movements. Strength is 5/5 throughout all four extremities.    Sensory  Light touch is normal in upper and lower extremities.     Reflexes                                            Right                      Left  Brachioradialis                    2+                         2+  Biceps                                 2+                         2+  Triceps                                2+                         2+  Patellar                                2+                         2+  Achilles                                2+                         2+    Right pathological reflexes: Anthony's absent. Ankle clonus absent.  Left pathological reflexes: Anthony's absent. Ankle clonus absent.    Coordination  Right: Finger-to-nose normal.Left: Finger-to-nose normal.    Gait  Casual gait is normal including stance, stride, and arm swing.      Radiology Results Review: I personally reviewed the following  image studies in PACS and associated radiology reports: CTA head with and without contrast. My interpretation of the radiology images/reports is: See discussion above.

## (undated) DEVICE — NEEDLE EPID TUOHY 18G X 3.5IN  WNG CLR LF

## (undated) DEVICE — TROCAR: Brand: KII FIOS FIRST ENTRY

## (undated) DEVICE — NEEDLE 18 G X 1 1/2

## (undated) DEVICE — GLOVE SRG BIOGEL ORTHOPEDIC 8

## (undated) DEVICE — EXOFIN PRECISION PEN HIGH VISCOSITY TOPICAL SKIN ADHESIVE: Brand: EXOFIN PRECISION PEN, 1G

## (undated) DEVICE — DRAPE EQUIPMENT RF WAND

## (undated) DEVICE — METZENBAUM ADTEC SINGLE USE DISSECTING SCISSORS, SHAFT ONLY, MONOPOLAR, CURVED TO LEFT, WORKING LENGTH: 12 1/4", (310 MM), DIAM. 5 MM, INSULATED, DOUBLE ACTION, STERILE, DISPOSABLE, PACKAGE OF 10 PIECES: Brand: AESCULAP

## (undated) DEVICE — GLOVE PI ULTRA TOUCH SZ.6.5

## (undated) DEVICE — SUT VICRYL 0 UR-6 27 IN J603H

## (undated) DEVICE — TOWEL SURG XR DETECT GREEN STRL RFD

## (undated) DEVICE — ALLENTOWN DR  LUCENTE S LAP PK: Brand: CARDINAL HEALTH

## (undated) DEVICE — INTENDED FOR TISSUE SEPARATION, AND OTHER PROCEDURES THAT REQUIRE A SHARP SURGICAL BLADE TO PUNCTURE OR CUT.: Brand: BARD-PARKER SAFETY BLADES SIZE 11, STERILE

## (undated) DEVICE — TUBING SUCTION 5MM X 12 FT

## (undated) DEVICE — NEEDLE 23G X 1 1/2 SAFETY-GLIDE THIN WALL

## (undated) DEVICE — LIGAMAX 5 MM ENDOSCOPIC MULTIPLE CLIP APPLIER: Brand: LIGAMAX

## (undated) DEVICE — TUBING INSUFFLATION SET ISO CONNECTOR

## (undated) DEVICE — EXIDINE 4 PCT

## (undated) DEVICE — SUT MONOCRYL 4-0 PS-2 27 IN Y426H

## (undated) DEVICE — 2000CC GUARDIAN II: Brand: GUARDIAN

## (undated) DEVICE — SUT VICRYL 2-0 SH 27 IN UNDYED J417H

## (undated) DEVICE — SUT PDS II 2-0 CT-2 27 IN Z333H

## (undated) DEVICE — BAG DECANTER

## (undated) DEVICE — DECANTER: Brand: UNBRANDED

## (undated) DEVICE — SCD SEQUENTIAL COMPRESSION COMFORT SLEEVE MEDIUM KNEE LENGTH: Brand: KENDALL SCD

## (undated) DEVICE — ADHESIVE SKN CLSR HISTOACRYL FLEX 0.5ML LF

## (undated) DEVICE — CURITY PLAIN PACKING STRIP: Brand: CURITY

## (undated) DEVICE — CAUTERY TIP POLISHER: Brand: DEVON

## (undated) DEVICE — 3M™ STERI-DRAPE™ UNDER BUTTOCKS DRAPE WITH POUCH 1084: Brand: STERI-DRAPE™

## (undated) DEVICE — SMOKE EVACUATION TUBING WITH 8 IN INTEGRAL WAND AND SPONGE GUARD: Brand: BUFFALO FILTER

## (undated) DEVICE — BULB SYRINGE,IRRIGATION WITH PROTECTIVE CAP: Brand: DOVER

## (undated) DEVICE — TROCAR: Brand: KII® SLEEVE

## (undated) DEVICE — PACK PBDS LAP CHOLE RF

## (undated) DEVICE — GLOVE PI ULTRA TOUCH SZ.8.0

## (undated) DEVICE — NEEDLE HYPO 22G X 1-1/2 IN

## (undated) DEVICE — SUT PROLENE 0 CT-2 30 IN 8412H

## (undated) DEVICE — VIAL DECANTER

## (undated) DEVICE — PREMIUM DRY TRAY LF: Brand: MEDLINE INDUSTRIES, INC.

## (undated) DEVICE — GLOVE INDICATOR PI UNDERGLOVE SZ 6.5 BLUE

## (undated) DEVICE — ELECTROSURGICAL DEVICE HOLSTER;FOR USE WITH MAXIMUM PEAK VOLTAGE OF 4000 V: Brand: FORCE TRIVERSE

## (undated) DEVICE — HARMONIC 1100 SHEARS, 36CM SHAFT LENGTH: Brand: HARMONIC

## (undated) DEVICE — BAG URINE DRAINAGE 2000ML ANTI RFLX LF

## (undated) DEVICE — MEDI-VAC YANKAUER SUCTION HANDLE W/BULBOUS AND CONTROL VENT: Brand: CARDINAL HEALTH

## (undated) DEVICE — CATH FOLEY 18FR 5ML 2 WAY UNCOATED SILICONE